# Patient Record
Sex: FEMALE | Race: WHITE | NOT HISPANIC OR LATINO | Employment: FULL TIME | URBAN - METROPOLITAN AREA
[De-identification: names, ages, dates, MRNs, and addresses within clinical notes are randomized per-mention and may not be internally consistent; named-entity substitution may affect disease eponyms.]

---

## 2023-08-03 ENCOUNTER — TELEPHONE (OUTPATIENT)
Dept: OBSTETRICS AND GYNECOLOGY | Facility: CLINIC | Age: 28
End: 2023-08-03
Payer: COMMERCIAL

## 2023-08-07 ENCOUNTER — INITIAL PRENATAL (OUTPATIENT)
Dept: OBSTETRICS AND GYNECOLOGY | Facility: CLINIC | Age: 28
End: 2023-08-07
Payer: COMMERCIAL

## 2023-08-07 VITALS — BODY MASS INDEX: 25.89 KG/M2 | SYSTOLIC BLOOD PRESSURE: 122 MMHG | WEIGHT: 137 LBS | DIASTOLIC BLOOD PRESSURE: 82 MMHG

## 2023-08-07 DIAGNOSIS — O30.031 MONOCHORIONIC DIAMNIOTIC TWIN GESTATION IN FIRST TRIMESTER: ICD-10-CM

## 2023-08-07 DIAGNOSIS — Z34.91 PRENATAL CARE IN FIRST TRIMESTER: Primary | ICD-10-CM

## 2023-08-07 DIAGNOSIS — Z3A.08 8 WEEKS GESTATION OF PREGNANCY: ICD-10-CM

## 2023-08-07 PROBLEM — G43.909 MIGRAINES: Status: ACTIVE | Noted: 2023-08-07

## 2023-08-07 PROCEDURE — 0501F PRENATAL FLOW SHEET: CPT

## 2023-08-07 RX ORDER — PRENATAL VIT NO.126/IRON/FOLIC 28MG-0.8MG
TABLET ORAL DAILY
COMMUNITY

## 2023-08-07 NOTE — ASSESSMENT & PLAN NOTE
PDC Anatomy scan  2x week  testing at 28 weeks   Desires Maternity 21 with gender  Baby aspirin 10-36 weeks

## 2023-08-07 NOTE — PROGRESS NOTES
Initial ob visit     CC- Here for care of pregnancy        Shweta Canela is a 28 y.o. female, , who presents for her first obstetrical visit.  Her last LMP was No LMP recorded. Patient is pregnant.. Her HUA is 3/16/2024, by Ultrasound. Current GA is 8w2d.     Initial positive test date : 23, UPT, blood        Her periods are: every 21-28 days  Prior obstetric issues: none, first pregnancy  Patient's past medical history is significant for:  migraines .  Family history of genetic issues (includes FOB): none  Prior infections concerning in pregnancy (Rash, fever in last 2 weeks): No  Varicella Hx - unknown   Prior testing for Cystic Fibrosis Carrier or Sickle Cell Trait- no  Prepregnancy BMI - Body mass index is 25.89 kg/mý.  History of STD: no  Hx of HSV for patient or partner: no  Ultrasound Today: yes    OB History    Para Term  AB Living   1             SAB IAB Ectopic Molar Multiple Live Births                    # Outcome Date GA Lbr Khadar/2nd Weight Sex Delivery Anes PTL Lv   1 Current                Additional Pertinent History   Last Pap : May 2023 Result: negative HPV: negative     Last Completed Pap Smear       This patient has no relevant Health Maintenance data.          History of abnormal Pap smear: yes - its been a while, negative after  Family history of uterine, colon, breast, or ovarian cancer: no  Feelings of Anxiety or Depression: no  Tobacco Usage?: No   Alcohol/Drug Use?: NO and four white claws before knowing she was pregnant  Over the age of 35 at delivery: no  Desires Genetic Screening: Cell Free DNA  Flu Status: not currently flu season    PMH    Current Outpatient Medications:     prenatal vitamin (prenatal, CLASSIC, vitamin) tablet, Take  by mouth Daily., Disp: , Rfl:      Past Medical History:   Diagnosis Date    Chronic paroxysmal hemicrania, not intractable     Concussion     Migraines     Oral contraceptive use         Past Surgical History:   Procedure  Laterality Date    EAR TUBES      WISDOM TOOTH EXTRACTION         Review of Systems   Review of Systems   Constitutional: Negative.    HENT: Negative.     Eyes: Negative.    Respiratory: Negative.     Cardiovascular: Negative.    Gastrointestinal:  Positive for nausea.   Endocrine: Negative.    Genitourinary:         Cramping   Musculoskeletal: Negative.    Skin: Negative.    Allergic/Immunologic: Negative.    Neurological: Negative.    Hematological: Negative.    Psychiatric/Behavioral: Negative.       Patient Reports: Nausea, Cramping, and Fatigue  Patient Denies: Spotting and Heavy bleeding  All systems reviewed and otherwise normal.    I have reviewed and agree with the HPI, ROS, and historical information as entered above. Karen Steiner, APRN      /82   Wt 62.1 kg (137 lb)   BMI 25.89 kg/mý     The additional following portions of the patient's history were reviewed and updated as appropriate: allergies, current medications, past family history, past medical history, past social history, past surgical history, and problem list.    Physical Exam  General:  well developed; well nourished  no acute distress   Chest/Respiratory: No labored breathing, normal respiratory effort, normal appearance, no respiratory noises noted                            Assessment and Plan    Problem List Items Addressed This Visit          Gravid and     Monochorionic diamniotic twin gestation in first trimester    Current Assessment & Plan     PDC Anatomy scan  2x week  testing at 28 weeks   Desires Maternity 21 with gender  Baby aspirin 10-36 weeks         Relevant Orders    US Ob < 14 Weeks Single or First Gestation    [facility]     Other Visit Diagnoses       Prenatal care in first trimester    -  Primary    Relevant Orders    Obstetric Panel    HIV-1 / O / 2 Ag / Antibody 4th Generation    Urine Culture - Urine, Urine, Clean Catch    Urinalysis With Microscopic - Urine,  Clean Catch    Chlamydia trachomatis, Neisseria gonorrhoeae, PCR - Urine, Urine, Random Void    Urine Drug Screen - Urine, Clean Catch    8 weeks gestation of pregnancy        Relevant Orders    Obstetric Panel    HIV-1 / O / 2 Ag / Antibody 4th Generation    Urine Culture - Urine, Urine, Clean Catch    Urinalysis With Microscopic - Urine, Clean Catch    Chlamydia trachomatis, Neisseria gonorrhoeae, PCR - Urine, Urine, Random Void    Urine Drug Screen - Urine, Clean Catch            Pregnancy at 8w2d. Ultrasound today showed Monochorionic-diamniotic twin pregnancy. 8w2d HUA 3/16/24 Fetus 1: 145bpm Fetus 2: 163 bpm.  Reviewed routine prenatal care with the office and educational materials given  Lab(s) Ordered  Discussed options for genetic testing including first trimester nuchal translucency screen, genetic disease carrier testing, quadruple screen, and NIPT  Discontinue the use of all non-medicinal drugs and chemicals  Nausea/Vomiting - desires medication.  Options discussed and encouraged to try unisom and vitamin b6 first..  Patient is on Prenatal vitamins  U/S ordered at follow up  Referral to PDC Ordered-Anatomy scan at 20 weeks  discussed baby aspirin from 10-36 weeks for prevention of preeclampsia   Patient desires to see Dr. Curtis as her ob provider.  Patient instructed to come to office next week to have Maternity 21 drawn.  Return in about 4 weeks (around 9/4/2023) for Ultrasound, .      Karen Steiner, APRN   08/07/2023

## 2023-08-10 LAB
ABO GROUP BLD: ABNORMAL
AMPHETAMINES UR QL SCN: NEGATIVE NG/ML
APPEARANCE UR: CLEAR
BACTERIA #/AREA URNS HPF: ABNORMAL /[HPF]
BACTERIA UR CULT: NORMAL
BACTERIA UR CULT: NORMAL
BARBITURATES UR QL SCN: NEGATIVE NG/ML
BASOPHILS # BLD AUTO: 0 X10E3/UL (ref 0–0.2)
BASOPHILS NFR BLD AUTO: 0 %
BENZODIAZ UR QL SCN: NEGATIVE NG/ML
BILIRUB UR QL STRIP: NEGATIVE
BLD GP AB SCN SERPL QL: NEGATIVE
BZE UR QL SCN: NEGATIVE NG/ML
C TRACH RRNA SPEC QL NAA+PROBE: NEGATIVE
CANNABINOIDS UR QL SCN: NEGATIVE NG/ML
CASTS URNS QL MICRO: ABNORMAL /LPF
COLOR UR: YELLOW
CREAT UR-MCNC: 174 MG/DL (ref 20–300)
CRYSTALS URNS MICRO: ABNORMAL
EOSINOPHIL # BLD AUTO: 0.1 X10E3/UL (ref 0–0.4)
EOSINOPHIL NFR BLD AUTO: 1 %
EPI CELLS #/AREA URNS HPF: >10 /HPF (ref 0–10)
ERYTHROCYTE [DISTWIDTH] IN BLOOD BY AUTOMATED COUNT: 12.1 % (ref 11.7–15.4)
GLUCOSE UR QL STRIP: NEGATIVE
HBV SURFACE AG SERPL QL IA: NEGATIVE
HCT VFR BLD AUTO: 37.3 % (ref 34–46.6)
HCV IGG SERPL QL IA: NON REACTIVE
HGB BLD-MCNC: 13 G/DL (ref 11.1–15.9)
HGB UR QL STRIP: NEGATIVE
HIV 1+2 AB+HIV1 P24 AG SERPL QL IA: NON REACTIVE
IMM GRANULOCYTES # BLD AUTO: 0 X10E3/UL (ref 0–0.1)
IMM GRANULOCYTES NFR BLD AUTO: 0 %
KETONES UR QL STRIP: NEGATIVE
LABORATORY COMMENT REPORT: NORMAL
LEUKOCYTE ESTERASE UR QL STRIP: NEGATIVE
LYMPHOCYTES # BLD AUTO: 2.4 X10E3/UL (ref 0.7–3.1)
LYMPHOCYTES NFR BLD AUTO: 21 %
MCH RBC QN AUTO: 32.5 PG (ref 26.6–33)
MCHC RBC AUTO-ENTMCNC: 34.9 G/DL (ref 31.5–35.7)
MCV RBC AUTO: 93 FL (ref 79–97)
METHADONE UR QL SCN: NEGATIVE NG/ML
MICRO URNS: NORMAL
MICRO URNS: NORMAL
MONOCYTES # BLD AUTO: 0.9 X10E3/UL (ref 0.1–0.9)
MONOCYTES NFR BLD AUTO: 8 %
N GONORRHOEA RRNA SPEC QL NAA+PROBE: NEGATIVE
NEUTROPHILS # BLD AUTO: 8 X10E3/UL (ref 1.4–7)
NEUTROPHILS NFR BLD AUTO: 70 %
NITRITE UR QL STRIP: NEGATIVE
OPIATES UR QL SCN: NEGATIVE NG/ML
OXYCODONE+OXYMORPHONE UR QL SCN: NEGATIVE NG/ML
PCP UR QL: NEGATIVE NG/ML
PH UR STRIP: 6.5 [PH] (ref 5–7.5)
PH UR: 6.4 [PH] (ref 4.5–8.9)
PLATELET # BLD AUTO: 275 X10E3/UL (ref 150–450)
PROPOXYPH UR QL SCN: NEGATIVE NG/ML
PROT UR QL STRIP: NEGATIVE
RBC # BLD AUTO: 4 X10E6/UL (ref 3.77–5.28)
RBC #/AREA URNS HPF: ABNORMAL /HPF (ref 0–2)
RH BLD: POSITIVE
RPR SER QL: NON REACTIVE
RUBV IGG SERPL IA-ACNC: 6.59 INDEX
SP GR UR STRIP: 1.02 (ref 1–1.03)
UNIDENT CRYS URNS QL MICRO: PRESENT
UROBILINOGEN UR STRIP-MCNC: 0.2 MG/DL (ref 0.2–1)
WBC # BLD AUTO: 11.5 X10E3/UL (ref 3.4–10.8)
WBC #/AREA URNS HPF: ABNORMAL /HPF (ref 0–5)

## 2023-08-11 ENCOUNTER — TELEPHONE (OUTPATIENT)
Dept: OBSTETRICS AND GYNECOLOGY | Facility: CLINIC | Age: 28
End: 2023-08-11
Payer: COMMERCIAL

## 2023-08-11 ENCOUNTER — TELEMEDICINE (OUTPATIENT)
Dept: FAMILY MEDICINE CLINIC | Facility: TELEHEALTH | Age: 28
End: 2023-08-11
Payer: COMMERCIAL

## 2023-08-11 DIAGNOSIS — R50.9 LOW GRADE FEVER: ICD-10-CM

## 2023-08-11 DIAGNOSIS — R51.9 ACUTE NONINTRACTABLE HEADACHE, UNSPECIFIED HEADACHE TYPE: Primary | ICD-10-CM

## 2023-08-11 PROBLEM — J45.40 MODERATE PERSISTENT ASTHMA WITHOUT COMPLICATION: Status: ACTIVE | Noted: 2023-05-26

## 2023-08-11 PROBLEM — H72.90 SEROUS OTITIS MEDIA WITH RUPTURE OF TYMPANIC MEMBRANE: Status: ACTIVE | Noted: 2023-05-26

## 2023-08-11 PROBLEM — Z30.9 CONTRACEPTIVE MANAGEMENT: Status: ACTIVE | Noted: 2017-06-29

## 2023-08-11 PROBLEM — F41.9 ANXIETY: Status: ACTIVE | Noted: 2023-05-26

## 2023-08-11 PROBLEM — H57.12 EYE PAIN, LEFT: Status: ACTIVE | Noted: 2023-05-26

## 2023-08-11 PROBLEM — R01.1 MURMUR: Status: ACTIVE | Noted: 2023-05-26

## 2023-08-11 PROBLEM — F32.A DEPRESSION, CONTROLLED: Status: ACTIVE | Noted: 2020-08-24

## 2023-08-11 PROBLEM — T75.3XXA MOTION SICKNESS: Status: ACTIVE | Noted: 2023-05-26

## 2023-08-11 PROBLEM — R45.86 MOOD SWINGS: Status: ACTIVE | Noted: 2023-05-26

## 2023-08-11 PROBLEM — H66.002 NON-RECURRENT ACUTE SUPPURATIVE OTITIS MEDIA OF LEFT EAR WITHOUT SPONTANEOUS RUPTURE OF TYMPANIC MEMBRANE: Status: ACTIVE | Noted: 2023-05-26

## 2023-08-11 PROBLEM — H65.90 SEROUS OTITIS MEDIA WITH RUPTURE OF TYMPANIC MEMBRANE: Status: ACTIVE | Noted: 2023-05-26

## 2023-08-11 PROBLEM — G89.29 CHRONIC NONINTRACTABLE HEADACHE: Status: ACTIVE | Noted: 2023-05-26

## 2023-08-11 PROBLEM — Z12.4 ENCOUNTER FOR PAPANICOLAOU SMEAR OF CERVIX: Status: ACTIVE | Noted: 2018-09-19

## 2023-08-11 PROBLEM — K12.1 ULCER MOUTH: Status: ACTIVE | Noted: 2023-05-26

## 2023-08-11 PROBLEM — G43.919 MIGRAINE, INTRACTABLE: Status: ACTIVE | Noted: 2023-05-26

## 2023-08-11 PROBLEM — J45.909 ASTHMA: Status: ACTIVE | Noted: 2023-05-26

## 2023-08-11 PROBLEM — K12.0 ORAL APHTHOUS ULCER: Status: ACTIVE | Noted: 2023-05-26

## 2023-08-11 RX ORDER — ALBUTEROL SULFATE 90 UG/1
AEROSOL, METERED RESPIRATORY (INHALATION)
COMMUNITY
Start: 2023-03-24

## 2023-08-11 RX ORDER — ACETAMINOPHEN 325 MG/1
TABLET ORAL
COMMUNITY

## 2023-08-12 NOTE — PROGRESS NOTES
You have chosen to receive care through a telehealth visit.  Do you consent to use a video/audio connection for your medical care today? Yes     CHIEF COMPLAINT  Chief Complaint   Patient presents with    Headache         HPI  Shweta Canela is a 28 y.o. female  presents with complaint of major headache and a low grade fever. Reports she felt hot earlier and checked her temp 99.6. Reports she is 9 weeks pregnant with twins. Reports her head was hurting in the back right. Rate it a 7 on 1-10 scale. Reports she took some tylenol for her symptoms. Reports she is concerned regarding her low grade fever due to having some bacteria in her urine in the office 4 days ago. Reports she had a few chills at work. Denies any nausea or vomiting.     Review of Systems   Constitutional:  Positive for chills and fever. Negative for fatigue.        Low grade fever   HENT:  Negative for congestion, ear discharge, ear pain, sinus pressure, sinus pain and sore throat.    Respiratory:  Negative for cough, chest tightness, shortness of breath and wheezing.    Cardiovascular:  Negative for chest pain.   Gastrointestinal:  Negative for abdominal pain, diarrhea, nausea and vomiting.   Genitourinary:  Positive for frequency. Negative for dysuria, urgency and vaginal bleeding.   Musculoskeletal:  Negative for back pain and myalgias.   Neurological:  Positive for headaches. Negative for dizziness.   Psychiatric/Behavioral: Negative.       Past Medical History:   Diagnosis Date    Chronic paroxysmal hemicrania, not intractable     Concussion     Migraines     Oral contraceptive use        Family History   Problem Relation Age of Onset    No Known Problems Mother     No Known Problems Father     Migraines Maternal Aunt     Heart attack Maternal Grandfather     Coronary artery disease Maternal Grandfather     Cancer Other         lung    No Known Problems Sister     No Known Problems Maternal Grandmother        Social History     Socioeconomic  History    Marital status:    Tobacco Use    Smoking status: Never    Smokeless tobacco: Never   Vaping Use    Vaping Use: Never used   Substance and Sexual Activity    Alcohol use: Yes     Alcohol/week: 4.0 standard drinks     Types: 4 Cans of beer per week     Comment: four beers before knowing pregnant    Drug use: No    Sexual activity: Yes     Partners: Male     Birth control/protection: Condom       Shweta Canela  reports that she has never smoked. She has never used smokeless tobacco.. I have educated her on the risk of diseases from using tobacco products such as cancer, COPD, and heart disease.         I spent  1  minutes counseling the patient.              Breastfeeding No     PHYSICAL EXAM  Physical Exam   Constitutional: She is oriented to person, place, and time. She appears well-developed and well-nourished. No distress.   HENT:   Head: Normocephalic and atraumatic.   Right Ear: Hearing normal.   Left Ear: Hearing normal.   Nose: No congestion.   Mouth/Throat: Mouth/Lips are normal.  Eyes: Conjunctivae and lids are normal.   Pulmonary/Chest: Effort normal.  No respiratory distress.  Abdominal: There is no abdominal tenderness.   Patient directed exam   Neurological: She is alert and oriented to person, place, and time.   Psychiatric: She has a normal mood and affect. Her speech is normal and behavior is normal.     Results for orders placed or performed in visit on 08/07/23   Urine Culture - Urine, Urine, Clean Catch    Specimen: Urine, Clean Catch         CD- [id number]   Result Value Ref Range    Urine Culture Final report     Result 1 Comment    Chlamydia trachomatis, Neisseria gonorrhoeae, PCR - Urine, Urine, Random Void    Specimen: Urine, Random Void         CD- [id number]   Result Value Ref Range    Chlamydia trachomatis, GE[place]A Negative Negative    Neisseria gonorrhoeae, [place] Negative Negative   Obstetric Panel    Specimen: Blood   Result Value Ref Range    Hepatitis B Surface Ag  Negative Negative    Hep C Virus Ab Non Reactive Non Reactive    RPR Non Reactive Non Reactive    Rubella Antibodies, IgG 6.59 Immune >0.99 index    ABO Type A     Rh Factor Positive     Antibody Screen Negative Negative    WBC 11.5 (H) 3.4 - 10.8 x10E3/uL    RBC 4.00 3.77 - 5.28 x10E6/uL    Hemoglobin 13.0 11.1 - 15.9 g/dL    Hematocrit 37.3 34.0 - 46.6 %    MCV 93 79 - 97 fL    MCH 32.5 26.6 - 33.0 pg    MCHC 34.9 31.5 - 35.7 g/dL    RDW 12.1 11.7 - 15.4 %    Platelets 275 150 - 450 x10E3/uL    Neutrophil Rel % 70 Not Estab. %    Lymphocyte Rel % 21 Not Estab. %    Monocyte Rel % 8 Not Estab. %    Eosinophil Rel % 1 Not Estab. %    Basophil Rel % 0 Not Estab. %    Neutrophils Absolute 8.0 (H) 1.4 - 7.0 x10E3/uL    Lymphocytes Absolute 2.4 0.7 - 3.1 x10E3/uL    Monocytes Absolute 0.9 0.1 - 0.9 x10E3/uL    Eosinophils Absolute 0.1 0.0 - 0.4 x10E3/uL    Basophils Absolute 0.0 0.0 - 0.2 x10E3/uL    Immature Granulocyte Rel % 0 Not Estab. %    Immature Grans Absolute 0.0 0.0 - 0.1 x10E3/uL   HIV-1 / O / 2 Ag / Antibody 4th Generation    Specimen: Blood   Result Value Ref Range    HIV Screen 4th Gen w/RFX (Reference) Non Reactive Non Reactive   Urine Drug Screen - Urine, Clean Catch    Specimen: Urine, Clean Catch   Result Value Ref Range    Amphetamine, Urine Qual Negative Mymnie=0324 ng/mL    Barbiturates Screen, Urine Negative Rvjsdr=412 ng/mL    Benzodiazepine Screen, Urine Negative Ukwvks=358 ng/mL    THC Screen, Urine Negative Cutoff=20 ng/mL    Cocaine Screen, Urine Negative Trlcjy=700 ng/mL    Opiate Screen, Urine Negative Dlbmpf=228 ng/mL    Oxycodone/Oxymorphone, Urine Negative Dvfntq=278 ng/mL    Phencyclidine (PCP), Urine Negative Cutoff=25 ng/mL    Methadone Screen, Urine Negative Ipznke=989 ng/mL    Propoxyphene Screen Negative Afzvfu=826 ng/mL    Creatinine, Urine 174.0 20.0 - 300.0 mg/dL    pH, UA 6.4 4.5 - 8.9    Please note Comment    Microscopic Examination -   Result Value Ref Range    WBC, UA None  seen 0 - 5 /hpf    RBC, UA None seen 0 - 2 /hpf    Epithelial Cells (non renal) >10 (A) 0 - 10 /hpf    Casts None seen None seen /lpf    Crystals, UA Present (A) N/A    Crystal Type Calcium Oxalate N/A    Bacteria, UA Moderate (A) None seen/Few   Urinalysis With Microscopic - Urine, Clean Catch    Specimen: Urine, Clean Catch   Result Value Ref Range    Specific Gravity, UA 1.023 1.005 - 1.030    pH, UA 6.5 5.0 - 7.5    Color, UA Yellow Yellow    Appearance, UA Clear Clear    Leukocytes, UA Negative Negative    Protein Negative Negative/Trace    Glucose, UA Negative Negative    Ketones Negative Negative    Blood, UA Negative Negative    Bilirubin, UA Negative Negative    Urobilinogen, UA 0.2 0.2 - 1.0 mg/dL    Nitrite, UA Negative Negative    Microscopic Examination Comment     Microscopic Examination See below:        Diagnoses and all orders for this visit:    1. Acute nonintractable headache, unspecified headache type (Primary)    2. Low grade fever    Advised patient to go to ER for in person evaluation. Doesn't wish to go to [facility] where she is planning on delivering. Understands the risk of not going to the ER. Reports she is thinking of going to urgent care in am. Encouraged her to go to ER reports she will go to [facility] now.            Nena Rankin, APRN  08/11/2023  21:51 EDT    The use of a video visit has been reviewed with the patient and verbal informed consent has been obtained. Myself and Shweta Canela participated in this visit. The patient is located in [address] [place] [address].    I am located in [place], [place]. Mychart and Twilio were utilized. I spent 5 minutes in the patient's chart for this visit.

## 2023-08-14 ENCOUNTER — LAB (OUTPATIENT)
Dept: OBSTETRICS AND GYNECOLOGY | Facility: CLINIC | Age: 28
End: 2023-08-14
Payer: COMMERCIAL

## 2023-08-14 DIAGNOSIS — O30.031 MONOCHORIONIC DIAMNIOTIC TWIN GESTATION IN FIRST TRIMESTER: ICD-10-CM

## 2023-08-14 DIAGNOSIS — Z34.01 ENCOUNTER FOR SUPERVISION OF NORMAL FIRST PREGNANCY IN FIRST TRIMESTER: Primary | ICD-10-CM

## 2023-08-14 DIAGNOSIS — Z34.91 FIRST TRIMESTER PREGNANCY: ICD-10-CM

## 2023-08-18 ENCOUNTER — TELEPHONE (OUTPATIENT)
Dept: OBSTETRICS AND GYNECOLOGY | Facility: CLINIC | Age: 28
End: 2023-08-18
Payer: COMMERCIAL

## 2023-08-18 LAB
5P15 DELETION (CRI-DU-CHAT): NOT DETECTED
CFDNA.FET/CFDNA.TOTAL SFR FETUS: NORMAL %
CITATION REF LAB TEST: NORMAL
FET 13+18+21+X+Y ANEUP PLAS.CFDNA: NEGATIVE
FET 1P36 DEL RISK WBC.DNA+CFDNA QL: NOT DETECTED
FET 22Q11.2 DEL RISK WBC.DNA+CFDNA QL: NOT DETECTED
FET CHR 11Q23 DEL PLAS.CFDNA QL: NOT DETECTED
FET CHR 15Q11 DEL PLAS.CFDNA QL: NOT DETECTED
FET CHR 21 TS PLAS.CFDNA QL: NEGATIVE
FET CHR 4P16 DEL PLAS.CFDNA QL: NOT DETECTED
FET CHR 8Q24 DEL PLAS.CFDNA QL: NOT DETECTED
FET SEX PLAS.CFDNA DOSAGE CFDNA: NORMAL
FET TS 13 RISK PLAS.CFDNA QL: NEGATIVE
FET TS 18 RISK WBC.DNA+CFDNA QL: NEGATIVE
GA EST FROM CONCEPTION DATE: NORMAL D
GESTATIONAL AGE > 9:: YES
LAB DIRECTOR NAME PROVIDER: NORMAL
LAB DIRECTOR NAME PROVIDER: NORMAL
LABORATORY COMMENT REPORT: NORMAL
LIMITATIONS OF THE TEST: NORMAL
NEGATIVE PREDICTIVE VALUE: NORMAL
NOTE: NORMAL
PERFORMANCE CHARACTERISTICS: NORMAL
POSITIVE PREDICTIVE VALUE: NORMAL
REF LAB TEST METHOD: NORMAL
TEST PERFORMANCE INFO SPEC: NORMAL
TRIOSOMY 16: NOT DETECTED
TRISOMY 22: NOT DETECTED

## 2023-08-18 NOTE — TELEPHONE ENCOUNTER
PT IS 10 WEEKS PREGNANT AND IS HAVING BACK PAIN AND SHE STATED THAT IT FEELS LIKE IT NEEDS TO BE POPPED. SHE WOULD LIKE SOMEONE TO RETURN HER CALL.

## 2023-08-18 NOTE — TELEPHONE ENCOUNTER
"[facility] OB patient  9w6d    Patient reports low back pain that radiates to her tail bone. Patient states this pain is worse with walking. Patient states that she has \"normal\" back aches, but this is different. Patient states that this pain is sharp and shooting. Patient denies vaginal bleeding and urinary symptoms. Patient denies injury. I advised patient to try Tylenol, warm bath, and stretching to see if this helps resolve pain. Per KN- this is sciatic nerve pain and no further recommendations.     Patient with questions regarding status of [id number]. Per Gerri- results are pending.     Informed the patient of this and she v/u.   "

## 2023-09-05 ENCOUNTER — ROUTINE PRENATAL (OUTPATIENT)
Dept: OBSTETRICS AND GYNECOLOGY | Facility: CLINIC | Age: 28
End: 2023-09-05
Payer: COMMERCIAL

## 2023-09-05 VITALS — DIASTOLIC BLOOD PRESSURE: 82 MMHG | WEIGHT: 138 LBS | SYSTOLIC BLOOD PRESSURE: 112 MMHG | BODY MASS INDEX: 26.07 KG/M2

## 2023-09-05 DIAGNOSIS — Z34.91 PRENATAL CARE IN FIRST TRIMESTER: Primary | ICD-10-CM

## 2023-09-05 DIAGNOSIS — O30.031 MONOCHORIONIC DIAMNIOTIC TWIN GESTATION IN FIRST TRIMESTER: ICD-10-CM

## 2023-09-05 LAB
GLUCOSE UR STRIP-MCNC: NEGATIVE MG/DL
PROT UR STRIP-MCNC: NEGATIVE MG/DL

## 2023-09-05 NOTE — PROGRESS NOTES
OB FOLLOW UP  CC- Here for care of pregnancy        Shweta Canela is a 28 y.o.  12w3d patient being seen today for her obstetrical follow up visit. Patient reports headache (That is relieved by Tylenol or rest) and nausea. Patient states that she is trying to eat small meals every 2-3 hours. Patient states that she is feeling arce quicker. Patient states that she is constantly nauseous. Patient states that she tested for a UTI yesterday for tailbone pain with walking. Patient states that they saw microscopic blood in her urine under a microscope. Patient denies urinary s/s.     Patient will attempt to leave [facility] prior to leaving today.      Her prenatal care is complicated by (and status) :   Patient Active Problem List   Diagnosis    Monochorionic diamniotic twin gestation in first trimester    Migraines    Anxiety    Asthma    Chronic nonintractable headache    Eye pain, left    Contraceptive management    Encounter for Papanicolaou smear of cervix    Screening for STD (sexually transmitted disease)    Depression, controlled    Moderate persistent asthma without complication    Mood swings    Motion sickness    Murmur    Non-recurrent acute suppurative otitis media of left ear without spontaneous rupture of tympanic membrane    Oral aphthous ulcer    Ulcer mouth    Serous otitis media with rupture of tympanic membrane    Migraine, intractable       Desires genetic testing?:  Completed. Low risk, BOY.  Ultrasound Today: Yes. FHR: 160/159.     ROS -   Patient Reports :  see above  Patient Denies: Loss of Fluid, Vaginal Spotting, Vision Changes, and Vomiting   Fetal Movement :  N/A  All other systems reviewed and are negative.     The additional following portions of the patient's history were reviewed and updated as appropriate: allergies and current medications.    I have reviewed and agree with the HPI, ROS, and historical information as entered above. Selina Curtis MD          /82   Wt  62.6 kg (138 lb)   BMI 26.07 kg/m²         EXAM:     Prenatal Vitals  BP: 112/82  Weight: 62.6 kg (138 lb)   Fetal Heart Rate: 160/159          Urine Glucose Read-only: Negative  Urine Protein Read-only: Negative       Assessment and Plan    Problem List Items Addressed This Visit          Gravid and     Monochorionic diamniotic twin gestation in first trimester     Other Visit Diagnoses       Prenatal care in first trimester    -  Primary    Relevant Orders    POC Urinalysis Dipstick (Completed)            Pregnancy at 12w3d  Labs reviewed from New OB Visit.  Counseled on genetic testing, carrier status and option for NT screen  Activity and Exercise discussed.  Patient is on Prenatal vitamins  Mono / Di twins - u/s in 4 weeks then to PDC at 18 weeks.    Return in about 4 weeks (around 10/3/2023) for US with Next Visit.    Selina Curtis MD  2023

## 2023-09-20 ENCOUNTER — TELEPHONE (OUTPATIENT)
Dept: OBSTETRICS AND GYNECOLOGY | Facility: CLINIC | Age: 28
End: 2023-09-20
Payer: COMMERCIAL

## 2023-09-20 NOTE — TELEPHONE ENCOUNTER
14w4d. Recommended 1000mg of tylenol, beverage with caffeine, 50mg of benadryl, a bottle of water, lay down and rest. If after waking up the HA is still there or her BP is >120/80 then call us back. She DILSHAD. States she checked her BP just now and it was 131/81.  Informed to call back if not down by the time she gets up. Brooke YUNG

## 2023-09-20 NOTE — TELEPHONE ENCOUNTER
Pt stated she has had a headache for 3 days stated a 7/10 on the pain scale. Stated its origin is the back of her head. Stated she is also incredibly nauseous and extremely fatigued.  Stated hasn't had blurry or spots on her vision and denies dizziness

## 2023-09-21 ENCOUNTER — TELEPHONE (OUTPATIENT)
Dept: OBSTETRICS AND GYNECOLOGY | Facility: CLINIC | Age: 28
End: 2023-09-21
Payer: COMMERCIAL

## 2023-09-21 NOTE — TELEPHONE ENCOUNTER
See previous phone note. Pt is calling back to say she is not feeling any better. She states her headache improved yesterday but is back today, she took her BP and is was 110 over something she could not remember the bottom number.     Please advise

## 2023-09-21 NOTE — TELEPHONE ENCOUNTER
Patient is calling about seeing dark spots. Patient states her head is stilling hurting.    111/71 was her last reading at 1145

## 2023-09-21 NOTE — TELEPHONE ENCOUNTER
[facility] OB pt  14w5d Twin pregnancy    Patient states that she has had a headache for the past 4 days. Patient states that she called yesterday & that her headache went away after taking Tylenol, Benadryl, caffeine, and getting some rest. The headache came back just before bed & it is still at the back of her head. Patient states it does not hurt as bad as it did. She rates it a 5-6/10. Patient states her B/P is 111/71. Patient states she is seeing some occasional spots today.    Per EF, Patient can try allergy medication such as Zyrtec or Sudafed with Tylenol, call with any B/P 140/90 & encourage pt to schedule an appt for an eye exam.    Patient notified of recommendations & to call back if allergy medication with Tylenol does not relieve headache. Patient v/u.

## 2023-10-10 ENCOUNTER — ROUTINE PRENATAL (OUTPATIENT)
Dept: OBSTETRICS AND GYNECOLOGY | Facility: CLINIC | Age: 28
End: 2023-10-10
Payer: COMMERCIAL

## 2023-10-10 VITALS — BODY MASS INDEX: 26.83 KG/M2 | DIASTOLIC BLOOD PRESSURE: 70 MMHG | WEIGHT: 142 LBS | SYSTOLIC BLOOD PRESSURE: 106 MMHG

## 2023-10-10 DIAGNOSIS — Z23 FLU VACCINE NEED: ICD-10-CM

## 2023-10-10 DIAGNOSIS — O30.031 MONOCHORIONIC DIAMNIOTIC TWIN GESTATION IN FIRST TRIMESTER: Primary | ICD-10-CM

## 2023-10-10 DIAGNOSIS — Z34.92 PRENATAL CARE IN SECOND TRIMESTER: ICD-10-CM

## 2023-10-10 LAB
GLUCOSE UR STRIP-MCNC: NEGATIVE MG/DL
PROT UR STRIP-MCNC: NEGATIVE MG/DL

## 2023-10-10 NOTE — PROGRESS NOTES
OB FOLLOW UP  CC- Here for care of pregnancy        Shweta Canela is a 28 y.o.  17w3d patient being seen today for her obstetrical follow up visit. Patient reports headache. That is relieved by Tylenol or rest. , low back pain. She denies dysuria., and heartburn. She is taking OTC medication for treatment currently.    Her prenatal care is complicated by (and status) :   Patient Active Problem List   Diagnosis    Monochorionic diamniotic twin gestation in first trimester    Migraines    Anxiety    Asthma    Chronic nonintractable headache    Eye pain, left    Contraceptive management    Encounter for Papanicolaou smear of cervix    Screening for STD (sexually transmitted disease)    Depression, controlled    Moderate persistent asthma without complication    Mood swings    Motion sickness    Murmur    Non-recurrent acute suppurative otitis media of left ear without spontaneous rupture of tympanic membrane    Oral aphthous ulcer    Ulcer mouth    Serous otitis media with rupture of tympanic membrane    Migraine, intractable       Flu Status: Will give in office today  Ultrasound Today: Yes    AFP: is undecided about    ROS -   Patient Reports :  see above  Patient Denies: Loss of Fluid, Vaginal Spotting, Vision Changes, Nausea , Vomiting , Contractions, and Epigastric pain  Fetal Movement : normal  All other systems reviewed and are negative.       The additional following portions of the patient's history were reviewed and updated as appropriate: allergies and current medications.      I have reviewed and agree with the HPI, ROS, and historical information as entered above. Selina Curtis MD          EXAM:     Prenatal Vitals  BP: 106/70  Weight: 64.4 kg (142 lb)   Fetal Heart Rate: pos / pos        Urine Glucose Read-only: Negative  Urine Protein Read-only: Negative           Assessment and Plan    Problem List Items Addressed This Visit          Gravid and     Monochorionic diamniotic twin  gestation in first trimester - Primary    Relevant Orders    US OB Follow Up Transabdominal Approach    US Ob Follow Up Transabdominal Approach each additional gestation (Completed)     Other Visit Diagnoses       Prenatal care in second trimester        Relevant Orders    POC Urinalysis Dipstick (Completed)    Fluzone >6 Months (8998-5133) (Completed)    US OB Follow Up Transabdominal Approach    US Ob Follow Up Transabdominal Approach each additional gestation (Completed)    Flu vaccine need        Relevant Orders    Fluzone >6 Months (8342-6059) (Completed)            Pregnancy at 17w3d  Mono Di tiwns - concordant growth.  PDC next visit for anatomy  Fetal status reassuring.   Counseled on MSAFP alone in relation to OTD and placental issues.    Anatomy scan next visit.   Activity and Exercise discussed.  Patient is on Prenatal vitamins  Return in about 4 weeks (around 11/7/2023) for US with Next Visit.    Selina Curtis MD  10/10/2023

## 2023-10-11 ENCOUNTER — TELEPHONE (OUTPATIENT)
Dept: OBSTETRICS AND GYNECOLOGY | Facility: CLINIC | Age: 28
End: 2023-10-11
Payer: COMMERCIAL

## 2023-10-11 NOTE — TELEPHONE ENCOUNTER
Returned patient's call. G1 @ 17w 4d with Mo/Di twins. States her visit note says she was counseled on MSAFP alone but she does not recall discussing it. She asked if ultrasound done yesterday noted any cause for concern for ONTD. Advised her that no concern was noted; that is a standard test that is offered to all patients. She v/u and will discuss further at next visit.

## 2023-10-11 NOTE — TELEPHONE ENCOUNTER
Pt would like to discuss questions regarding blood work for msafp (read patient message sent from [employer] today) she would like someone to call to discuss this further

## 2023-10-20 ENCOUNTER — TELEPHONE (OUTPATIENT)
Dept: OBSTETRICS AND GYNECOLOGY | Facility: CLINIC | Age: 28
End: 2023-10-20
Payer: COMMERCIAL

## 2023-10-20 NOTE — TELEPHONE ENCOUNTER
Patient denies contractions, vaginal bleeding, urinary urgency, and dysuria. She reports the pain is in her lower back, similar to a muscle ache. We discussed that she can take tylenol, rest, take a warm bath, and increase fluids but if s/s do not improve or if they worsen she will need to be evaluated.

## 2023-10-24 ENCOUNTER — ROUTINE PRENATAL (OUTPATIENT)
Dept: OBSTETRICS AND GYNECOLOGY | Facility: CLINIC | Age: 28
End: 2023-10-24
Payer: COMMERCIAL

## 2023-10-24 ENCOUNTER — OFFICE VISIT (OUTPATIENT)
Dept: OBSTETRICS AND GYNECOLOGY | Facility: HOSPITAL | Age: 28
End: 2023-10-24
Payer: COMMERCIAL

## 2023-10-24 ENCOUNTER — HOSPITAL ENCOUNTER (OUTPATIENT)
Dept: WOMENS IMAGING | Facility: HOSPITAL | Age: 28
Discharge: HOME | End: 2023-10-24
Payer: COMMERCIAL

## 2023-10-24 VITALS — BODY MASS INDEX: 27.13 KG/M2 | SYSTOLIC BLOOD PRESSURE: 102 MMHG | WEIGHT: 143.6 LBS | DIASTOLIC BLOOD PRESSURE: 68 MMHG

## 2023-10-24 VITALS — DIASTOLIC BLOOD PRESSURE: 77 MMHG | WEIGHT: 143 LBS | BODY MASS INDEX: 27.02 KG/M2 | SYSTOLIC BLOOD PRESSURE: 112 MMHG

## 2023-10-24 DIAGNOSIS — Z34.92 PRENATAL CARE IN SECOND TRIMESTER: Primary | ICD-10-CM

## 2023-10-24 DIAGNOSIS — O30.031 MONOCHORIONIC DIAMNIOTIC TWIN GESTATION IN FIRST TRIMESTER: ICD-10-CM

## 2023-10-24 DIAGNOSIS — O30.031 MONOCHORIONIC DIAMNIOTIC TWIN GESTATION IN FIRST TRIMESTER: Primary | ICD-10-CM

## 2023-10-24 LAB
GLUCOSE UR STRIP-MCNC: NEGATIVE MG/DL
PROT UR STRIP-MCNC: NEGATIVE MG/DL

## 2023-10-24 PROCEDURE — 0502F SUBSEQUENT PRENATAL CARE: CPT | Performed by: OBSTETRICS & GYNECOLOGY

## 2023-10-24 PROCEDURE — 76812 OB US DETAILED ADDL FETUS: CPT

## 2023-10-24 PROCEDURE — 76811 OB US DETAILED SNGL FETUS: CPT

## 2023-10-24 PROCEDURE — 76820 UMBILICAL ARTERY ECHO: CPT

## 2023-10-24 RX ORDER — MAGNESIUM HYDROXIDE/ALUMINUM HYDROXICE/SIMETHICONE 120; 1200; 1200 MG/30ML; MG/30ML; MG/30ML
5 SUSPENSION ORAL EVERY 6 HOURS PRN
COMMUNITY

## 2023-10-24 RX ORDER — CALCIUM CARBONATE 500 MG/1
1 TABLET, CHEWABLE ORAL AS NEEDED
COMMUNITY

## 2023-10-24 RX ORDER — ASPIRIN 81 MG/1
81 TABLET ORAL DAILY
COMMUNITY

## 2023-10-24 NOTE — PROGRESS NOTES
Patient denies bleeding, leaking fluid or contractions  NIPT negative  Patients next follow up with Dr. Curtis office is today

## 2023-10-24 NOTE — PROGRESS NOTES
OB FOLLOW UP  CC- Here for care of pregnancy        Shweta Canela is a 28 y.o.  19w3d patient being seen today for her obstetrical follow up visit. Patient reports headache (That is relieved by Tylenol or rest), mid-back pain (She denies dysuria), heartburn (She is taking OTC medication for treatment currently), and bilateral hip/sciatic nerve pain. Patient states that she has been having intermittent mid-back pain. Patient states that she thinks her mid-back pain is related to her chair at work. Patient denies urinary s/s. Patient states that she is having difficulty sleeping at night from sciatic nerve and hip pain.     Her prenatal care is complicated by (and status) :   Patient Active Problem List   Diagnosis    Monochorionic diamniotic twin gestation in first trimester    Migraines    Anxiety    Asthma    Chronic nonintractable headache    Eye pain, left    Contraceptive management    Encounter for Papanicolaou smear of cervix    Screening for STD (sexually transmitted disease)    Depression, controlled    Moderate persistent asthma without complication    Mood swings    Motion sickness    Murmur    Non-recurrent acute suppurative otitis media of left ear without spontaneous rupture of tympanic membrane    Oral aphthous ulcer    Ulcer mouth    Serous otitis media with rupture of tympanic membrane    Migraine, intractable       Flu Status: Already given in current flu season  Ultrasound Today: Yes with PDC    ROS -   Patient Reports :  see above  Patient Denies: Loss of Fluid, Vaginal Spotting, Vision Changes, Nausea , Vomiting , Contractions, and Epigastric pain  Fetal Movement : normal  All other systems reviewed and are negative.       The additional following portions of the patient's history were reviewed and updated as appropriate: allergies and current medications.      I have reviewed and agree with the HPI, ROS, and historical information as entered above. Selina Curtis MD      BP  102/68   Wt 65.1 kg (143 lb 9.6 oz)   BMI 27.13 kg/m²       EXAM:     Prenatal Vitals  BP: 102/68  Weight: 65.1 kg (143 lb 9.6 oz)   Fetal Heart Rate: 140/140          Urine Glucose Read-only: Negative  Urine Protein Read-only: Negative       Assessment and Plan    Problem List Items Addressed This Visit          Gravid and     Monochorionic diamniotic twin gestation in first trimester    Relevant Orders    US Ob Limited 1 + Fetuses     Other Visit Diagnoses       Prenatal care in second trimester    -  Primary    Relevant Orders    POC Urinalysis Dipstick (Completed)            Pregnancy at 19w3d  Anatomy scan today is complete and appear within normal limits. Concordant growth.  Fetal status reassuring.   Activity and Exercise discussed.  Patient is on Prenatal vitamins  Return in about 2 weeks (around 2023) for US with Next Visit.    Selina Curtis MD  10/24/2023

## 2023-11-03 ENCOUNTER — TELEPHONE (OUTPATIENT)
Dept: OBSTETRICS AND GYNECOLOGY | Facility: CLINIC | Age: 28
End: 2023-11-03
Payer: COMMERCIAL

## 2023-11-03 NOTE — TELEPHONE ENCOUNTER
Patient calling to go over her anatomy scan results. She states she did some research on [employer] and she wants to make sure everything is ok.

## 2023-11-03 NOTE — TELEPHONE ENCOUNTER
Per DANDY Garcia- CPCs are resolving and genetic testing is negative. PDC did not express concern so this is reassuring. Patient can discuss further concerns with LOS at her next f/u appointment. Informed patient of this and she v/u.

## 2023-11-03 NOTE — TELEPHONE ENCOUNTER
"LOS OB patient  20w6d  Next visit: 11/07/2023 with [facility]    Patient states that she had her anatomy scan with PDC on 10/24/2023 and [facility] did not see anything alarming or abnormal on her anatomy scan. Patient states that her AVS was posted last night from [facility] and saw an \"abnormality\" concerning a Choroid Plexus Cyst for Twin B. Patient states that she is concerned and would like some reassurance. cfDNA testing negative.   "

## 2023-11-07 ENCOUNTER — ROUTINE PRENATAL (OUTPATIENT)
Dept: OBSTETRICS AND GYNECOLOGY | Facility: CLINIC | Age: 28
End: 2023-11-07
Payer: COMMERCIAL

## 2023-11-07 VITALS — DIASTOLIC BLOOD PRESSURE: 70 MMHG | BODY MASS INDEX: 27.89 KG/M2 | SYSTOLIC BLOOD PRESSURE: 118 MMHG | WEIGHT: 147.6 LBS

## 2023-11-07 DIAGNOSIS — Z34.92 PRENATAL CARE IN SECOND TRIMESTER: Primary | ICD-10-CM

## 2023-11-07 DIAGNOSIS — O30.031 MONOCHORIONIC DIAMNIOTIC TWIN GESTATION IN FIRST TRIMESTER: ICD-10-CM

## 2023-11-07 LAB
GLUCOSE UR STRIP-MCNC: NEGATIVE MG/DL
PROT UR STRIP-MCNC: NEGATIVE MG/DL

## 2023-11-07 NOTE — PROGRESS NOTES
OB FOLLOW UP  CC- Here for care of pregnancy        Shweta Canela is a 28 y.o.  21w3d patient being seen today for her obstetrical follow up visit. Patient reports headache (That is sometimes relieved by Tylenol or rest), low back pain (She denies dysuria), heartburn (She is taking OTC medication for treatment currently), and constipation. Patient states that her headaches are improving. Patient with h/o migraines. Patient states that she has IBS and has always struggled with constipation. Patient denies taking stool softener.    Her prenatal care is complicated by (and status) :   Patient Active Problem List   Diagnosis    Monochorionic diamniotic twin gestation in first trimester    Migraines    Anxiety    Asthma    Chronic nonintractable headache    Eye pain, left    Contraceptive management    Encounter for Papanicolaou smear of cervix    Screening for STD (sexually transmitted disease)    Depression, controlled    Moderate persistent asthma without complication    Mood swings    Motion sickness    Murmur    Non-recurrent acute suppurative otitis media of left ear without spontaneous rupture of tympanic membrane    Oral aphthous ulcer    Ulcer mouth    Serous otitis media with rupture of tympanic membrane    Migraine, intractable       Flu Status: Already given in current flu season  Ultrasound Today: Yes. Fetus 1: 145bpm. Cephalic. Posterior placenta. 3 vessel cord. AF: normal. Fetus 2: 151bpm. Breech. Posterior placenta. 3 vessel cord. AF: normal. Patient scheduled to see [facility] on 2023.    ROS -   Patient Reports :  see above  Patient Denies: Loss of Fluid, Vaginal Spotting, Vision Changes, Nausea , Vomiting , Contractions, and Epigastric pain  Fetal Movement : normal  All other systems reviewed and are negative.       The additional following portions of the patient's history were reviewed and updated as appropriate: allergies and current medications.      I have reviewed and agree with the  HPI, ROS, and historical information as entered above. Selina Curtis MD      /70   Wt 67 kg (147 lb 9.6 oz)   BMI 27.89 kg/m²       EXAM:     Prenatal Vitals  BP: 118/70  Weight: 67 kg (147 lb 9.6 oz)   Fetal Heart Rate: 145/151          Urine Glucose Read-only: Negative  Urine Protein Read-only: Negative       Assessment and Plan    Problem List Items Addressed This Visit          Gravid and     Monochorionic diamniotic twin gestation in first trimester     Other Visit Diagnoses       Prenatal care in second trimester    -  Primary    Relevant Orders    POC Urinalysis Dipstick (Completed)            Pregnancy at 21w3d  U/S today reviewed.  Fetal status reassuring.   Activity and Exercise discussed.  Patient is on Prenatal vitamins  Return in about 2 weeks (around 2023).    Selina Curtis MD  2023

## 2023-11-21 ENCOUNTER — ROUTINE PRENATAL (OUTPATIENT)
Dept: OBSTETRICS AND GYNECOLOGY | Facility: CLINIC | Age: 28
End: 2023-11-21
Payer: COMMERCIAL

## 2023-11-21 ENCOUNTER — HOSPITAL ENCOUNTER (OUTPATIENT)
Dept: WOMENS IMAGING | Facility: HOSPITAL | Age: 28
Discharge: HOME | End: 2023-11-21
Admitting: OBSTETRICS & GYNECOLOGY
Payer: COMMERCIAL

## 2023-11-21 ENCOUNTER — OFFICE VISIT (OUTPATIENT)
Dept: OBSTETRICS AND GYNECOLOGY | Facility: HOSPITAL | Age: 28
End: 2023-11-21
Payer: COMMERCIAL

## 2023-11-21 VITALS — BODY MASS INDEX: 28.53 KG/M2 | WEIGHT: 151 LBS | SYSTOLIC BLOOD PRESSURE: 100 MMHG | DIASTOLIC BLOOD PRESSURE: 68 MMHG

## 2023-11-21 VITALS — DIASTOLIC BLOOD PRESSURE: 69 MMHG | SYSTOLIC BLOOD PRESSURE: 113 MMHG | WEIGHT: 151.8 LBS | BODY MASS INDEX: 28.68 KG/M2

## 2023-11-21 DIAGNOSIS — R10.13 EPIGASTRIC ABDOMINAL PAIN AFFECTING PREGNANCY: ICD-10-CM

## 2023-11-21 DIAGNOSIS — O30.031 MONOCHORIONIC DIAMNIOTIC TWIN GESTATION IN FIRST TRIMESTER: ICD-10-CM

## 2023-11-21 DIAGNOSIS — O30.031 MONOCHORIONIC DIAMNIOTIC TWIN GESTATION IN FIRST TRIMESTER: Primary | ICD-10-CM

## 2023-11-21 DIAGNOSIS — Z34.92 SECOND TRIMESTER PREGNANCY: Primary | ICD-10-CM

## 2023-11-21 DIAGNOSIS — O26.899 EPIGASTRIC ABDOMINAL PAIN AFFECTING PREGNANCY: ICD-10-CM

## 2023-11-21 LAB
GLUCOSE UR STRIP-MCNC: NEGATIVE MG/DL
PROT UR STRIP-MCNC: NEGATIVE MG/DL

## 2023-11-21 PROCEDURE — 93325 DOPPLER ECHO COLOR FLOW MAPG: CPT

## 2023-11-21 PROCEDURE — 76820 UMBILICAL ARTERY ECHO: CPT

## 2023-11-21 PROCEDURE — 76816 OB US FOLLOW-UP PER FETUS: CPT

## 2023-11-21 PROCEDURE — 76825 ECHO EXAM OF FETAL HEART: CPT

## 2023-11-21 NOTE — PROGRESS NOTES
OB FOLLOW UP  CC- Here for care of pregnancy        Shweta Canela is a 28 y.o.  23w3d patient being seen today for her obstetrical follow up visit. Patient reports epigastric pain.. 28wk instr given/A+. Pt feels well, +fm. Having right sided upper GI pain that is worse when sitting and goes away when she lays down.     Her prenatal care is complicated by (and status) :Mono/Di Twins  Patient Active Problem List   Diagnosis    Monochorionic diamniotic twin gestation in first trimester    Migraines    Anxiety    Asthma    Chronic nonintractable headache    Eye pain, left    Contraceptive management    Encounter for Papanicolaou smear of cervix    Screening for STD (sexually transmitted disease)    Depression, controlled    Moderate persistent asthma without complication    Mood swings    Motion sickness    Murmur    Non-recurrent acute suppurative otitis media of left ear without spontaneous rupture of tympanic membrane    Oral aphthous ulcer    Ulcer mouth    Serous otitis media with rupture of tympanic membrane    Migraine, intractable       Flu Status: Already given in current flu season  Ultrasound Today: Yes @ [facility]    ROS -   Patient Reports : Epigastric Pain  Patient Denies: Loss of Fluid, Vaginal Spotting, Vision Changes, Headaches, Nausea , Vomiting , and Contractions  Fetal Movement : normal  All other systems reviewed and are negative.       The additional following portions of the patient's history were reviewed and updated as appropriate: allergies and current medications.      I have reviewed and agree with the HPI, ROS, and historical information as entered above. Karen Steiner, APRN      /68   Wt 68.5 kg (151 lb)   BMI 28.53 kg/m²       EXAM:     Prenatal Vitals  BP: 100/68  Weight: 68.5 kg (151 lb)   Fetal Heart Rate: PDC         Urine Glucose Read-only: Negative  Urine Protein Read-only: Negative       Assessment and Plan    Problem List Items Addressed This Visit          Gravid  and     Monochorionic diamniotic twin gestation in first trimester    Overview     PDC ultrasound every 4 weeks  23  US with Curtis every 4 weeks (2 weeks in between pdc)  23  Start NST 2x weekly @ 32 weeks         Relevant Orders    US Ob Limited 1 + Fetuses    US Ob 14 + Weeks Each Additional Gestation     Other Visit Diagnoses       Second trimester pregnancy    -  Primary    Relevant Orders    POC Urinalysis Dipstick (Completed)    Epigastric abdominal pain affecting pregnancy        Relevant Orders    Preeclampsia Panel            Pregnancy at 23w3d  Fetal status reassuring. PDC scan today. Normal per pt. Report is pending.  PDC scan reviewed today.  1 hour gtt, CBC, Antibody screen, and TDAP next visit. Instructions given  Fetal movement/PTL or Labor precautions  Reviewed routine prenatal care with the office and educational materials given  Lab(s) Ordered-PEP r/t epigastric pain RUQ pain radiating to back  U/S ordered at follow up  Discussed/encouraged TDAP vaccination after 28 weeks  Reviewed Pre-eclampsia signs/symptoms  Discussed bASA for PIH prevention from 12 to 36wk  Reviewed Pre-eclampsia signs/symptoms-Headache not relieved by tylenol or rest, chest pain, shortness of breath, right upper quadrant pain, blurry vision. Monitor blood pressure at home call provider if above 140/90 consistently.     Follow up with Dr. Curtis in 2 weeks 23 with ultrasound, then PDC 23 with ultrasound.  Karen Steiner, APRN  2023

## 2023-11-21 NOTE — LETTER
"2023       No Recipients    Patient: Shweta Canela   YOB: 1995   Date of Visit: 2023       Dear CORRINE Solomon,    Thank you for referring Shweta Canela to me for evaluation. Below is a copy of my consult note.    If you have questions, please do not hesitate to call me. I look forward to following Shweta along with you.         Sincerely,        Meena Robles MD        CC:   No Recipients    No complaints today, Next f/u appointment with nasim today, NIPT neg         Maternal/Fetal Medicine Follow Up Note     Name: Shweta Canela    : 1995     MRN: [id number]     Referring Provider: CORRINE Solomon    Chief Complaint  M/D Twins, baby b cpc    Subjective     History of Present Illness:  Shweta Canela is a 28 y.o.  23w4d who presents today for follow up in the setting of Mo/Di twins and an incidentally noted CPC for Twin B   NIPS low risk   No interval issues     HUA: Estimated Date of Delivery: 3/16/24     ROS:   As noted in HPI.     Objective     Vital Signs  /69   Wt 68.9 kg (151 lb 12.8 oz)   Estimated body mass index is 28.68 kg/m² as calculated from the following:    Height as of 21: 154.9 cm (61\").    Weight as of this encounter: 68.9 kg (151 lb 12.8 oz).    Ultrasound Impression:   See viewpoint    Assessment and Plan     Shweta Canela is a 28 y.o.  23w4d     Diagnoses and all orders for this visit:    1. Monochorionic diamniotic twin gestation in first trimester (Primary)  Assessment & Plan:  Appropriate and concordant interval growth with normal appearing fetal echocardiograms x 2.   CPC for Twin B resolved.   No evidence of TTTS.   Follow up 2 weeks with Dr Curtis for TTTS check and 4 weeks with LOUISA for growth            Follow Up  4 weeks     I spent 20 minutes caring for the patient on the day of service. This included: obtaining or reviewing a separately obtained medical history, reviewing patient " records, performing a medically appropriate exam and/or evaluation, counseling or educating the patient/family/caregiver, ordering medications, labs, and/or procedures and documenting such in the medical record. This does not include time spent on review and interpretation of other tests such as fetal ultrasound or the performance of other procedures such as amniocentesis or CVS.    Meena Robles MD FACOG  Maternal Fetal Medicine, [facility]     2023

## 2023-11-22 LAB
ALT SERPL-CCNC: 21 IU/L (ref 0–32)
AST SERPL-CCNC: 19 IU/L (ref 0–40)
BASOPHILS # BLD AUTO: 0.1 X10E3/UL (ref 0–0.2)
BASOPHILS NFR BLD AUTO: 0 %
BUN SERPL-MCNC: 5 MG/DL (ref 6–20)
CREAT SERPL-MCNC: 0.5 MG/DL (ref 0.57–1)
CREAT UR-MCNC: ABNORMAL MG/DL
EGFRCR SERPLBLD CKD-EPI 2021: 132 ML/MIN/1.73
EOSINOPHIL # BLD AUTO: 0.2 X10E3/UL (ref 0–0.4)
EOSINOPHIL NFR BLD AUTO: 2 %
ERYTHROCYTE [DISTWIDTH] IN BLOOD BY AUTOMATED COUNT: 13 % (ref 11.7–15.4)
HCT VFR BLD AUTO: 32.8 % (ref 34–46.6)
HGB BLD-MCNC: 11.5 G/DL (ref 11.1–15.9)
IMM GRANULOCYTES # BLD AUTO: 0.2 X10E3/UL (ref 0–0.1)
IMM GRANULOCYTES NFR BLD AUTO: 1 %
LDH SERPL L TO P-CCNC: 180 IU/L (ref 119–226)
LYMPHOCYTES # BLD AUTO: 2.9 X10E3/UL (ref 0.7–3.1)
LYMPHOCYTES NFR BLD AUTO: 21 %
MCH RBC QN AUTO: 34 PG (ref 26.6–33)
MCHC RBC AUTO-ENTMCNC: 35.1 G/DL (ref 31.5–35.7)
MCV RBC AUTO: 97 FL (ref 79–97)
MICROALBUMIN UR-MCNC: ABNORMAL
MONOCYTES # BLD AUTO: 1.1 X10E3/UL (ref 0.1–0.9)
MONOCYTES NFR BLD AUTO: 8 %
NEUTROPHILS # BLD AUTO: 9.1 X10E3/UL (ref 1.4–7)
NEUTROPHILS NFR BLD AUTO: 68 %
PLATELET # BLD AUTO: 228 X10E3/UL (ref 150–450)
RBC # BLD AUTO: 3.38 X10E6/UL (ref 3.77–5.28)
URATE SERPL-MCNC: 3.3 MG/DL (ref 2.6–6.2)
WBC # BLD AUTO: 13.4 X10E3/UL (ref 3.4–10.8)

## 2023-11-22 NOTE — PROGRESS NOTES
"    Maternal/Fetal Medicine Follow Up Note     Name: Shweta Canela    : 1995     MRN: [id number]     Referring Provider: CORRINE Solomon    Chief Complaint  M/D Twins, baby b cpc    Subjective     History of Present Illness:  Shweta Canela is a 28 y.o.  23w4d who presents today for follow up in the setting of Mo/Di twins and an incidentally noted CPC for Twin B   NIPS low risk   No interval issues     HUA: Estimated Date of Delivery: 3/16/24     ROS:   As noted in HPI.     Objective     Vital Signs  /69   Wt 68.9 kg (151 lb 12.8 oz)   Estimated body mass index is 28.68 kg/m² as calculated from the following:    Height as of 21: 154.9 cm (61\").    Weight as of this encounter: 68.9 kg (151 lb 12.8 oz).    Ultrasound Impression:   See viewpoint    Assessment and Plan     Shweta Canela is a 28 y.o.  23w4d     Diagnoses and all orders for this visit:    1. Monochorionic diamniotic twin gestation in first trimester (Primary)  Assessment & Plan:  Appropriate and concordant interval growth with normal appearing fetal echocardiograms x 2.   CPC for Twin B resolved.   No evidence of TTTS.   Follow up 2 weeks with Dr Curtis for TTTS check and 4 weeks with LOUISA for growth            Follow Up  4 weeks     I spent 20 minutes caring for the patient on the day of service. This included: obtaining or reviewing a separately obtained medical history, reviewing patient records, performing a medically appropriate exam and/or evaluation, counseling or educating the patient/family/caregiver, ordering medications, labs, and/or procedures and documenting such in the medical record. This does not include time spent on review and interpretation of other tests such as fetal ultrasound or the performance of other procedures such as amniocentesis or CVS.    Meena Robles MD FACOG  Maternal Fetal Medicine, [facility]     2023  "

## 2023-11-22 NOTE — ASSESSMENT & PLAN NOTE
Appropriate and concordant interval growth with normal appearing fetal echocardiograms x 2.   CPC for Twin B resolved.   No evidence of TTTS.   Follow up 2 weeks with Dr Curtis for TTTS check and 4 weeks with LISETH for growth

## 2023-11-30 ENCOUNTER — ROUTINE PRENATAL (OUTPATIENT)
Dept: OBSTETRICS AND GYNECOLOGY | Facility: CLINIC | Age: 28
End: 2023-11-30
Payer: OTHER GOVERNMENT

## 2023-11-30 ENCOUNTER — HOSPITAL ENCOUNTER (OUTPATIENT)
Facility: HOSPITAL | Age: 28
Discharge: HOME | End: 2023-12-04
Attending: OBSTETRICS & GYNECOLOGY | Admitting: OBSTETRICS & GYNECOLOGY
Payer: COMMERCIAL

## 2023-11-30 VITALS — DIASTOLIC BLOOD PRESSURE: 72 MMHG | BODY MASS INDEX: 29.4 KG/M2 | WEIGHT: 155.6 LBS | SYSTOLIC BLOOD PRESSURE: 110 MMHG

## 2023-11-30 DIAGNOSIS — O30.031 MONOCHORIONIC DIAMNIOTIC TWIN GESTATION IN FIRST TRIMESTER: ICD-10-CM

## 2023-11-30 DIAGNOSIS — O26.879 ANTEPARTUM CERVICAL SHORTENING: ICD-10-CM

## 2023-11-30 DIAGNOSIS — K21.9 GASTROESOPHAGEAL REFLUX DISEASE, UNSPECIFIED WHETHER ESOPHAGITIS PRESENT: ICD-10-CM

## 2023-11-30 DIAGNOSIS — R10.11 RUQ ABDOMINAL PAIN: ICD-10-CM

## 2023-11-30 DIAGNOSIS — Z34.92 SECOND TRIMESTER PREGNANCY: Primary | ICD-10-CM

## 2023-11-30 LAB
ABO GROUP BLD: NORMAL
ALBUMIN SERPL-MCNC: 3.5 G/DL (ref 3.5–5.2)
ALBUMIN/GLOB SERPL: 1.2 G/DL
ALP SERPL-CCNC: 62 U/L (ref 39–117)
ALT SERPL W P-5'-P-CCNC: 41 U/L (ref 1–33)
ANION GAP SERPL CALCULATED.3IONS-SCNC: 12 MMOL/L (ref 5–15)
AST SERPL-CCNC: 25 U/L (ref 1–32)
BASOPHILS # BLD AUTO: 0.05 10*3/MM3 (ref 0–0.2)
BASOPHILS NFR BLD AUTO: 0.3 % (ref 0–1.5)
BILIRUB SERPL-MCNC: 0.2 MG/DL (ref 0–1.2)
BILIRUB UR QL STRIP: NEGATIVE
BLD GP AB SCN SERPL QL: NEGATIVE
BUN SERPL-MCNC: 8 MG/DL (ref 6–20)
BUN/CREAT SERPL: 16 (ref 7–25)
CALCIUM SPEC-SCNC: 9.3 MG/DL (ref 8.6–10.5)
CHLORIDE SERPL-SCNC: 104 MMOL/L (ref 98–107)
CLARITY UR: CLEAR
CO2 SERPL-SCNC: 21 MMOL/L (ref 22–29)
COLOR UR: YELLOW
CREAT SERPL-MCNC: 0.5 MG/DL (ref 0.57–1)
DEPRECATED RDW RBC AUTO: 47.2 FL (ref 37–54)
EGFRCR SERPLBLD CKD-EPI 2021: 132 ML/MIN/1.73
EOSINOPHIL # BLD AUTO: 0.21 10*3/MM3 (ref 0–0.4)
EOSINOPHIL NFR BLD AUTO: 1.5 % (ref 0.3–6.2)
ERYTHROCYTE [DISTWIDTH] IN BLOOD BY AUTOMATED COUNT: 13.4 % (ref 12.3–15.4)
GLOBULIN UR ELPH-MCNC: 2.9 GM/DL
GLUCOSE SERPL-MCNC: 79 MG/DL (ref 65–99)
GLUCOSE UR STRIP-MCNC: NEGATIVE MG/DL
GLUCOSE UR STRIP-MCNC: NEGATIVE MG/DL
HCT VFR BLD AUTO: 30.2 % (ref 34–46.6)
HGB BLD-MCNC: 10.6 G/DL (ref 12–15.9)
HGB UR QL STRIP.AUTO: NEGATIVE
IMM GRANULOCYTES # BLD AUTO: 0.14 10*3/MM3 (ref 0–0.05)
IMM GRANULOCYTES NFR BLD AUTO: 1 % (ref 0–0.5)
KETONES UR QL STRIP: ABNORMAL
LEUKOCYTE ESTERASE UR QL STRIP.AUTO: NEGATIVE
LYMPHOCYTES # BLD AUTO: 3.23 10*3/MM3 (ref 0.7–3.1)
LYMPHOCYTES NFR BLD AUTO: 22.6 % (ref 19.6–45.3)
MCH RBC QN AUTO: 34.3 PG (ref 26.6–33)
MCHC RBC AUTO-ENTMCNC: 35.1 G/DL (ref 31.5–35.7)
MCV RBC AUTO: 97.7 FL (ref 79–97)
MONOCYTES # BLD AUTO: 1.14 10*3/MM3 (ref 0.1–0.9)
MONOCYTES NFR BLD AUTO: 8 % (ref 5–12)
NEUTROPHILS NFR BLD AUTO: 66.6 % (ref 42.7–76)
NEUTROPHILS NFR BLD AUTO: 9.52 10*3/MM3 (ref 1.7–7)
NITRITE UR QL STRIP: NEGATIVE
NRBC BLD AUTO-RTO: 0 /100 WBC (ref 0–0.2)
PH UR STRIP.AUTO: 7 [PH] (ref 5–8)
PLATELET # BLD AUTO: 203 10*3/MM3 (ref 140–450)
PMV BLD AUTO: 9.9 FL (ref 6–12)
POTASSIUM SERPL-SCNC: 3.6 MMOL/L (ref 3.5–5.2)
PROT SERPL-MCNC: 6.4 G/DL (ref 6–8.5)
PROT UR QL STRIP: NEGATIVE
PROT UR STRIP-MCNC: NEGATIVE MG/DL
RBC # BLD AUTO: 3.09 10*6/MM3 (ref 3.77–5.28)
RH BLD: POSITIVE
SODIUM SERPL-SCNC: 137 MMOL/L (ref 136–145)
SP GR UR STRIP: 1.01 (ref 1–1.03)
T&S EXPIRATION DATE: NORMAL
UROBILINOGEN UR QL STRIP: ABNORMAL
WBC NRBC COR # BLD AUTO: 14.29 10*3/MM3 (ref 3.4–10.8)

## 2023-11-30 PROCEDURE — 86901 BLOOD TYPING SEROLOGIC RH(D): CPT | Performed by: OBSTETRICS & GYNECOLOGY

## 2023-11-30 PROCEDURE — 80053 COMPREHEN METABOLIC PANEL: CPT | Performed by: OBSTETRICS & GYNECOLOGY

## 2023-11-30 PROCEDURE — 25010000002 DEXAMETHASONE PER 1 MG: Performed by: NURSE PRACTITIONER

## 2023-11-30 PROCEDURE — 85025 COMPLETE CBC W/AUTO DIFF WBC: CPT | Performed by: OBSTETRICS & GYNECOLOGY

## 2023-11-30 PROCEDURE — 59025 FETAL NON-STRESS TEST: CPT

## 2023-11-30 PROCEDURE — G0378 HOSPITAL OBSERVATION PER HR: HCPCS

## 2023-11-30 PROCEDURE — 96372 THER/PROPH/DIAG INJ SC/IM: CPT

## 2023-11-30 PROCEDURE — 25810000003 LACTATED RINGERS PER 1000 ML

## 2023-11-30 PROCEDURE — 87086 URINE CULTURE/COLONY COUNT: CPT | Performed by: NURSE PRACTITIONER

## 2023-11-30 PROCEDURE — 86900 BLOOD TYPING SEROLOGIC ABO: CPT | Performed by: OBSTETRICS & GYNECOLOGY

## 2023-11-30 PROCEDURE — 96361 HYDRATE IV INFUSION ADD-ON: CPT

## 2023-11-30 PROCEDURE — G0463 HOSPITAL OUTPT CLINIC VISIT: HCPCS

## 2023-11-30 PROCEDURE — 96375 TX/PRO/DX INJ NEW DRUG ADDON: CPT

## 2023-11-30 PROCEDURE — 25010000002 MAGNESIUM SULFATE PER 500 MG OF MAGNESIUM

## 2023-11-30 PROCEDURE — 81003 URINALYSIS AUTO W/O SCOPE: CPT | Performed by: NURSE PRACTITIONER

## 2023-11-30 PROCEDURE — 86850 RBC ANTIBODY SCREEN: CPT | Performed by: OBSTETRICS & GYNECOLOGY

## 2023-11-30 PROCEDURE — 25010000002 ONDANSETRON PER 1 MG: Performed by: OBSTETRICS & GYNECOLOGY

## 2023-11-30 RX ORDER — ACETAMINOPHEN 325 MG/1
650 TABLET ORAL EVERY 4 HOURS PRN
OUTPATIENT
Start: 2023-11-30

## 2023-11-30 RX ORDER — ONDANSETRON 2 MG/ML
4 INJECTION INTRAMUSCULAR; INTRAVENOUS EVERY 6 HOURS PRN
Status: DISCONTINUED | OUTPATIENT
Start: 2023-11-30 | End: 2023-12-04 | Stop reason: HOSPADM

## 2023-11-30 RX ORDER — LIDOCAINE HYDROCHLORIDE 10 MG/ML
0.5 INJECTION, SOLUTION EPIDURAL; INFILTRATION; INTRACAUDAL; PERINEURAL ONCE AS NEEDED
OUTPATIENT
Start: 2023-11-30

## 2023-11-30 RX ORDER — ONDANSETRON 2 MG/ML
4 INJECTION INTRAMUSCULAR; INTRAVENOUS EVERY 8 HOURS PRN
OUTPATIENT
Start: 2023-11-30

## 2023-11-30 RX ORDER — BISACODYL 10 MG
10 SUPPOSITORY, RECTAL RECTAL DAILY PRN
OUTPATIENT
Start: 2023-11-30

## 2023-11-30 RX ORDER — FAMOTIDINE 20 MG/1
20 TABLET, FILM COATED ORAL 2 TIMES DAILY
Qty: 60 TABLET | Refills: 3 | Status: SHIPPED | OUTPATIENT
Start: 2023-11-30

## 2023-11-30 RX ORDER — MAGNESIUM SULFATE HEPTAHYDRATE 40 MG/ML
2 INJECTION, SOLUTION INTRAVENOUS CONTINUOUS
Status: DISCONTINUED | OUTPATIENT
Start: 2023-11-30 | End: 2023-11-30

## 2023-11-30 RX ORDER — ONDANSETRON 4 MG/1
8 TABLET, FILM COATED ORAL EVERY 8 HOURS PRN
OUTPATIENT
Start: 2023-11-30

## 2023-11-30 RX ORDER — DEXTROSE AND SODIUM CHLORIDE 5; .2 G/100ML; G/100ML
75 INJECTION, SOLUTION INTRAVENOUS CONTINUOUS
Status: DISCONTINUED | OUTPATIENT
Start: 2023-11-30 | End: 2023-12-04 | Stop reason: HOSPADM

## 2023-11-30 RX ORDER — SODIUM CHLORIDE 0.9 % (FLUSH) 0.9 %
10 SYRINGE (ML) INJECTION EVERY 12 HOURS SCHEDULED
OUTPATIENT
Start: 2023-11-30

## 2023-11-30 RX ORDER — SODIUM CHLORIDE 9 MG/ML
40 INJECTION, SOLUTION INTRAVENOUS AS NEEDED
OUTPATIENT
Start: 2023-11-30

## 2023-11-30 RX ORDER — DEXAMETHASONE SODIUM PHOSPHATE 10 MG/ML
6 INJECTION INTRAMUSCULAR; INTRAVENOUS 2 TIMES DAILY
Qty: 4 ML | Refills: 0 | Status: COMPLETED | OUTPATIENT
Start: 2023-11-30 | End: 2023-12-02

## 2023-11-30 RX ORDER — MAGNESIUM SULFATE HEPTAHYDRATE 40 MG/ML
1 INJECTION, SOLUTION INTRAVENOUS CONTINUOUS
Status: DISCONTINUED | OUTPATIENT
Start: 2023-11-30 | End: 2023-12-01

## 2023-11-30 RX ORDER — SODIUM CHLORIDE 0.9 % (FLUSH) 0.9 %
10 SYRINGE (ML) INJECTION AS NEEDED
OUTPATIENT
Start: 2023-11-30

## 2023-11-30 RX ORDER — FAMOTIDINE 10 MG/ML
20 INJECTION, SOLUTION INTRAVENOUS 2 TIMES DAILY
Status: DISCONTINUED | OUTPATIENT
Start: 2023-12-01 | End: 2023-12-04 | Stop reason: HOSPADM

## 2023-11-30 RX ORDER — DOCUSATE SODIUM 100 MG/1
100 CAPSULE, LIQUID FILLED ORAL 2 TIMES DAILY PRN
OUTPATIENT
Start: 2023-11-30

## 2023-11-30 RX ADMIN — ONDANSETRON 4 MG: 2 INJECTION INTRAMUSCULAR; INTRAVENOUS at 20:23

## 2023-11-30 RX ADMIN — DEXAMETHASONE SODIUM PHOSPHATE 6 MG: 10 INJECTION INTRAMUSCULAR; INTRAVENOUS at 20:27

## 2023-11-30 RX ADMIN — DEXTROSE AND SODIUM CHLORIDE 100 ML/HR: 5; 200 INJECTION, SOLUTION INTRAVENOUS at 20:00

## 2023-11-30 RX ADMIN — MAGNESIUM SULFATE HEPTAHYDRATE 2 G: 500 INJECTION, SOLUTION INTRAMUSCULAR; INTRAVENOUS at 20:24

## 2023-11-30 RX ADMIN — FAMOTIDINE 20 MG: 10 INJECTION, SOLUTION INTRAVENOUS at 23:58

## 2023-11-30 NOTE — H&P
Subjective   Shweta Canela is a 28 y.o.  24w5d patient being seen today for RUQ pain and R sided mid back pain. She states the pain is constant and notices it increase when she eats. She states it is keeping her from being able to sleep and work and is not relieved with tylenol. She states it has been occurring for a few weeks but has gotten worse over the last few days.  She has worsening GERD, elena at night.  She denies bleeding, other pain, pressure, dysuria, bowel changes, fever, nausea.  She has noticed lots of tightening in her abdomen.       Her prenatal care is complicated by (and status) :        Patient Active Problem List   Diagnosis    Monochorionic diamniotic twin gestation in first trimester    Migraines    Anxiety    Asthma    Chronic nonintractable headache    Eye pain, left    Contraceptive management    Encounter for Papanicolaou smear of cervix    Screening for STD (sexually transmitted disease)    Depression, controlled    Moderate persistent asthma without complication    Mood swings    Motion sickness    Murmur    Non-recurrent acute suppurative otitis media of left ear without spontaneous rupture of tympanic membrane    Oral aphthous ulcer    Ulcer mouth    Serous otitis media with rupture of tympanic membrane    Migraine, intractable            Ultrasound Today: No.     ROS -   Patient Reports :  RUQ and back pain  Patient Denies: Loss of Fluid, Vaginal Spotting, Vision Changes, Headaches, Nausea , Vomiting , Contractions, and Epigastric pain  Fetal Movement : normal  All other systems reviewed and are negative.         The additional following portions of the patient's history were reviewed and updated as appropriate: allergies and current medications.     I have reviewed and agree with the HPI, ROS, and historical information as entered above. Chitra Vega, APRN        /72   Wt 70.6 kg (155 lb 9.6 oz)   BMI 29.40 kg/m²         EXAM:      Prenatal Vitals  BP:  110/72  Weight: 70.6 kg (155 lb 9.6 oz)   Fetal Heart Rate: US      Cervix :  closed              Assessment   Assessment and Plan     Problem List Items Addressed This Visit                  Gravid and      Monochorionic diamniotic twin gestation in first trimester     Overview       PDC ultrasound every 4 weeks  23  US with Curtis every 4 weeks (2 weeks in between pdc)  23  Start NST 2x weekly @ 32 weeks           Relevant Orders     US Ob Limited 1 + Fetuses     US Gallbladder      Other Visit Diagnoses         Second trimester pregnancy    -  Primary     Relevant Medications     famotidine (Pepcid) 20 MG tablet     Other Relevant Orders     POC Urinalysis Dipstick (Completed)     US Ob Limited 1 + Fetuses     RUQ abdominal pain         Relevant Medications     famotidine (Pepcid) 20 MG tablet     Other Relevant Orders     US Ob Limited 1 + Fetuses     US Gallbladder     Gastroesophageal reflux disease, unspecified whether esophagitis present         Relevant Medications     famotidine (Pepcid) 20 MG tablet     Other Relevant Orders     US Gallbladder     Antepartum cervical shortening                    Pregnancy at 24w5d  Fetal status reassuring.   S/s c/w GI/GB issues.  Rev diet mod, Pepcid bid.  GB US.  Cx length today 2.3 cm--- PDC noted 4 cm on .    D/w LOS--- to [facility] for monitoring for contractions, CCUA, steroids, PDC US in am.  If ctx, Mag.  Rev with pt; she v/u.  Escorted to [facility] and notified charge nurse.     Chitra Vega, APRN  2023

## 2023-11-30 NOTE — TELEPHONE ENCOUNTER
Patient called and is in a lot of pain, said she had talked to the doctor last visit about this but its still hurting, said its around her ribs and back, she can't sleep at night

## 2023-11-30 NOTE — PROGRESS NOTES
OB FOLLOW UP  CC- Here for care of pregnancy        Shweta Canela is a 28 y.o.  24w5d patient being seen today for RUQ pain and R sided mid back pain. She states the pain is constant and notices it increase when she eats. She states it is keeping her from being able to sleep and work and is not relieved with tylenol. She states it has been occurring for a few weeks but has gotten worse over the last few days.  She has worsening GERD, elena at night.  She denies bleeding, other pain, pressure, dysuria, bowel changes, fever, nausea.  She has noticed lots of tightening in her abdomen.      Her prenatal care is complicated by (and status) :    Patient Active Problem List   Diagnosis    Monochorionic diamniotic twin gestation in first trimester    Migraines    Anxiety    Asthma    Chronic nonintractable headache    Eye pain, left    Contraceptive management    Encounter for Papanicolaou smear of cervix    Screening for STD (sexually transmitted disease)    Depression, controlled    Moderate persistent asthma without complication    Mood swings    Motion sickness    Murmur    Non-recurrent acute suppurative otitis media of left ear without spontaneous rupture of tympanic membrane    Oral aphthous ulcer    Ulcer mouth    Serous otitis media with rupture of tympanic membrane    Migraine, intractable         Ultrasound Today: No.    ROS -   Patient Reports :  RUQ and back pain  Patient Denies: Loss of Fluid, Vaginal Spotting, Vision Changes, Headaches, Nausea , Vomiting , Contractions, and Epigastric pain  Fetal Movement : normal  All other systems reviewed and are negative.       The additional following portions of the patient's history were reviewed and updated as appropriate: allergies and current medications.    I have reviewed and agree with the HPI, ROS, and historical information as entered above. Chitra Vega, APRN      /72   Wt 70.6 kg (155 lb 9.6 oz)   BMI 29.40 kg/m²       EXAM:      Prenatal Vitals  BP: 110/72  Weight: 70.6 kg (155 lb 9.6 oz)   Fetal Heart Rate: US   Dilation/Effacement/Station  Dilation: Closed   Cervix :  closed         Assessment and Plan    Problem List Items Addressed This Visit          Gravid and     Monochorionic diamniotic twin gestation in first trimester    Overview     PDC ultrasound every 4 weeks  23  US with Curtis every 4 weeks (2 weeks in between pdc)  23  Start NST 2x weekly @ 32 weeks         Relevant Orders    US Ob Limited 1 + Fetuses    US Gallbladder     Other Visit Diagnoses       Second trimester pregnancy    -  Primary    Relevant Medications    famotidine (Pepcid) 20 MG tablet    Other Relevant Orders    POC Urinalysis Dipstick (Completed)    US Ob Limited 1 + Fetuses    RUQ abdominal pain        Relevant Medications    famotidine (Pepcid) 20 MG tablet    Other Relevant Orders    US Ob Limited 1 + Fetuses    US Gallbladder    Gastroesophageal reflux disease, unspecified whether esophagitis present        Relevant Medications    famotidine (Pepcid) 20 MG tablet    Other Relevant Orders    US Gallbladder    Antepartum cervical shortening                Pregnancy at 24w5d  Fetal status reassuring.   S/s c/w GI/GB issues.  Rev diet mod, Pepcid bid.  GB US.  Cx length today 2.3 cm--- PDC noted 4 cm on .    D/w LOS--- to [facility] for monitoring for contractions, CCUA, steroids, PDC US in am.  If ctx, Mag.  Rev with pt; she v/u.  Escorted to [facility] and notified charge nurse.    Chitra Vega, APRN  2023

## 2023-12-01 ENCOUNTER — APPOINTMENT (OUTPATIENT)
Dept: WOMENS IMAGING | Facility: HOSPITAL | Age: 28
End: 2023-12-01
Payer: COMMERCIAL

## 2023-12-01 PROCEDURE — 25010000002 DEXAMETHASONE PER 1 MG: Performed by: NURSE PRACTITIONER

## 2023-12-01 PROCEDURE — 96372 THER/PROPH/DIAG INJ SC/IM: CPT

## 2023-12-01 PROCEDURE — 76815 OB US LIMITED FETUS(S): CPT

## 2023-12-01 PROCEDURE — 25010000002 MAGNESIUM SULFATE PER 500 MG OF MAGNESIUM: Performed by: OBSTETRICS & GYNECOLOGY

## 2023-12-01 PROCEDURE — 59025 FETAL NON-STRESS TEST: CPT

## 2023-12-01 PROCEDURE — 25810000003 LACTATED RINGERS PER 1000 ML: Performed by: OBSTETRICS & GYNECOLOGY

## 2023-12-01 PROCEDURE — 96366 THER/PROPH/DIAG IV INF ADDON: CPT

## 2023-12-01 PROCEDURE — 76815 OB US LIMITED FETUS(S): CPT | Performed by: OBSTETRICS & GYNECOLOGY

## 2023-12-01 PROCEDURE — 76817 TRANSVAGINAL US OBSTETRIC: CPT | Performed by: OBSTETRICS & GYNECOLOGY

## 2023-12-01 PROCEDURE — G0378 HOSPITAL OBSERVATION PER HR: HCPCS

## 2023-12-01 PROCEDURE — 96376 TX/PRO/DX INJ SAME DRUG ADON: CPT

## 2023-12-01 PROCEDURE — 96365 THER/PROPH/DIAG IV INF INIT: CPT

## 2023-12-01 PROCEDURE — 96361 HYDRATE IV INFUSION ADD-ON: CPT

## 2023-12-01 PROCEDURE — 76817 TRANSVAGINAL US OBSTETRIC: CPT

## 2023-12-01 RX ORDER — PRENATAL VIT/IRON FUM/FOLIC AC 27MG-0.8MG
1 TABLET ORAL DAILY
Status: DISCONTINUED | OUTPATIENT
Start: 2023-12-01 | End: 2023-12-02

## 2023-12-01 RX ORDER — MAGNESIUM SULFATE HEPTAHYDRATE 40 MG/ML
1 INJECTION, SOLUTION INTRAVENOUS CONTINUOUS
Status: DISCONTINUED | OUTPATIENT
Start: 2023-12-01 | End: 2023-12-01

## 2023-12-01 RX ORDER — ZOLPIDEM TARTRATE 5 MG/1
5 TABLET ORAL NIGHTLY PRN
Status: DISCONTINUED | OUTPATIENT
Start: 2023-12-01 | End: 2023-12-04 | Stop reason: HOSPADM

## 2023-12-01 RX ORDER — ACETAMINOPHEN 500 MG
1000 TABLET ORAL EVERY 6 HOURS PRN
Status: DISCONTINUED | OUTPATIENT
Start: 2023-12-01 | End: 2023-12-04 | Stop reason: HOSPADM

## 2023-12-01 RX ORDER — INDOMETHACIN 25 MG/1
50 CAPSULE ORAL ONCE
Status: COMPLETED | OUTPATIENT
Start: 2023-12-01 | End: 2023-12-01

## 2023-12-01 RX ORDER — ASPIRIN 81 MG/1
81 TABLET, CHEWABLE ORAL DAILY
Status: DISCONTINUED | OUTPATIENT
Start: 2023-12-01 | End: 2023-12-03

## 2023-12-01 RX ORDER — INDOMETHACIN 25 MG/1
50 CAPSULE ORAL ONCE
Status: DISCONTINUED | OUTPATIENT
Start: 2023-12-01 | End: 2023-12-01

## 2023-12-01 RX ADMIN — PRENATAL VITAMINS-IRON FUMARATE 27 MG IRON-FOLIC ACID 0.8 MG TABLET 1 TABLET: at 20:31

## 2023-12-01 RX ADMIN — DEXTROSE AND SODIUM CHLORIDE 75 ML/HR: 5; 200 INJECTION, SOLUTION INTRAVENOUS at 14:32

## 2023-12-01 RX ADMIN — MAGNESIUM SULFATE HEPTAHYDRATE 1 G/HR: 500 INJECTION, SOLUTION INTRAMUSCULAR; INTRAVENOUS at 06:17

## 2023-12-01 RX ADMIN — ZOLPIDEM TARTRATE 5 MG: 5 TABLET ORAL at 20:31

## 2023-12-01 RX ADMIN — INDOMETHACIN 50 MG: 25 CAPSULE ORAL at 06:11

## 2023-12-01 RX ADMIN — ACETAMINOPHEN 1000 MG: 500 TABLET ORAL at 13:53

## 2023-12-01 RX ADMIN — DEXAMETHASONE SODIUM PHOSPHATE 6 MG: 10 INJECTION INTRAMUSCULAR; INTRAVENOUS at 08:23

## 2023-12-01 RX ADMIN — DEXTROSE AND SODIUM CHLORIDE 100 ML/HR: 5; 200 INJECTION, SOLUTION INTRAVENOUS at 05:26

## 2023-12-01 RX ADMIN — MAGNESIUM SULFATE HEPTAHYDRATE 2 G/HR: 500 INJECTION, SOLUTION INTRAMUSCULAR; INTRAVENOUS at 23:36

## 2023-12-01 RX ADMIN — DEXAMETHASONE SODIUM PHOSPHATE 6 MG: 10 INJECTION INTRAMUSCULAR; INTRAVENOUS at 20:32

## 2023-12-01 RX ADMIN — ASPIRIN 81 MG CHEWABLE TABLET 81 MG: 81 TABLET CHEWABLE at 20:31

## 2023-12-01 RX ADMIN — FAMOTIDINE 20 MG: 10 INJECTION, SOLUTION INTRAVENOUS at 20:32

## 2023-12-01 RX ADMIN — FAMOTIDINE 20 MG: 10 INJECTION, SOLUTION INTRAVENOUS at 08:26

## 2023-12-01 NOTE — PROGRESS NOTES
FHR patterns reassuring however contractions noted every 3-5 minutes.  Given shortening of cervix < 2.5 cm and twins will start magnesium during steroid administration.  Restrict IVF rate  Intermittent FHR monitoring.   Dr. Amos on call and aware.

## 2023-12-01 NOTE — PROGRESS NOTES
Maternal-Fetal Medicine   Progress Note    Patient name: Shweta Canela  YOB: 1995   MRN: [id number]  Admission Date: 2023  Date of Service: 2023  Referring Provider: Selina Curtis       Shweta Canela is a 28 y.o.    at 24w6d  admitted on 2023 for Cervical shortening affecting pregnancy    Hospital day 0    No chief complaint on file.      Diagnoses:     Cervical shortening affecting pregnancy       Subjective:      Shweta has no complaints today.   Patient denies  headache:   Patient denies  right upper quadrant pain:   Reports fetal movement is normal  Denies leakage of amniotic fluid.  Denies vaginal bleeding    Objective:     Vital signs:  Temp:  [97.8 °F (36.6 °C)-98 °F (36.7 °C)] 97.9 °F (36.6 °C)  Heart Rate:  [] 75  Resp:  [16-18] 16  BP: ()/(53-74) 111/66    Abdomen: soft, nontender  Uterus: gravid, nontender  Extremities: nontender; no edema     Fetal heart rate tracing review  FHT's: Category 1  TOCO: none    Cervix: last check      Most recent ultrasound: 2023 apparently things look good with both babies.  Official results not back yet cervix 1.8 cm however    Medications:  dexamethasone, 6 mg, Intramuscular, BID  famotidine, 20 mg, Intravenous, BID         ondansetron    Labs:  Lab Results   Component Value Date    HGB 10.6 (L) 2023     Lab Results   Component Value Date    GLUCOSE 79 2023         Assessment/Plan:      Shweta is a 28 y.o.    at 24w6d.  1.   Active Hospital Problems    Diagnosis     **Cervical shortening affecting pregnancy       2.  Patient feels no contractions and cervix is definitely shortened.  Will need continued rest and monitoring.  3.   4.     Plan:   1.  Continue steroids magnesium and monitoring.  2.   3.   4.     I spent 15 minutes with this patient of which greater than 50% was face to face counseling and coordination of care.  All questions were answered to the best of my ablity.    Comfort  MD Dandre  12/1/2023  08:58 EST

## 2023-12-02 LAB
ALP SERPL-CCNC: 65 U/L (ref 39–117)
ALT SERPL W P-5'-P-CCNC: 27 U/L (ref 1–33)
AST SERPL-CCNC: 15 U/L (ref 1–32)
BACTERIA SPEC AEROBE CULT: NORMAL
BASOPHILS # BLD AUTO: 0.02 10*3/MM3 (ref 0–0.2)
BASOPHILS NFR BLD AUTO: 0.1 % (ref 0–1.5)
BILIRUB SERPL-MCNC: <0.2 MG/DL (ref 0–1.2)
CREAT SERPL-MCNC: 0.51 MG/DL (ref 0.57–1)
DEPRECATED RDW RBC AUTO: 48.3 FL (ref 37–54)
EOSINOPHIL # BLD AUTO: 0 10*3/MM3 (ref 0–0.4)
EOSINOPHIL NFR BLD AUTO: 0 % (ref 0.3–6.2)
ERYTHROCYTE [DISTWIDTH] IN BLOOD BY AUTOMATED COUNT: 13.3 % (ref 12.3–15.4)
HCT VFR BLD AUTO: 28.5 % (ref 34–46.6)
HGB BLD-MCNC: 9.8 G/DL (ref 12–15.9)
IMM GRANULOCYTES # BLD AUTO: 0.27 10*3/MM3 (ref 0–0.05)
IMM GRANULOCYTES NFR BLD AUTO: 1.9 % (ref 0–0.5)
LDH SERPL-CCNC: 169 U/L (ref 135–214)
LYMPHOCYTES # BLD AUTO: 1.32 10*3/MM3 (ref 0.7–3.1)
LYMPHOCYTES NFR BLD AUTO: 9.2 % (ref 19.6–45.3)
MCH RBC QN AUTO: 34.1 PG (ref 26.6–33)
MCHC RBC AUTO-ENTMCNC: 34.4 G/DL (ref 31.5–35.7)
MCV RBC AUTO: 99.3 FL (ref 79–97)
MONOCYTES # BLD AUTO: 0.97 10*3/MM3 (ref 0.1–0.9)
MONOCYTES NFR BLD AUTO: 6.7 % (ref 5–12)
NEUTROPHILS NFR BLD AUTO: 11.84 10*3/MM3 (ref 1.7–7)
NEUTROPHILS NFR BLD AUTO: 82.1 % (ref 42.7–76)
NRBC BLD AUTO-RTO: 0 /100 WBC (ref 0–0.2)
PLATELET # BLD AUTO: 195 10*3/MM3 (ref 140–450)
PMV BLD AUTO: 10.1 FL (ref 6–12)
RBC # BLD AUTO: 2.87 10*6/MM3 (ref 3.77–5.28)
URATE SERPL-MCNC: 2.9 MG/DL (ref 2.4–5.7)
WBC NRBC COR # BLD AUTO: 14.42 10*3/MM3 (ref 3.4–10.8)

## 2023-12-02 PROCEDURE — G0378 HOSPITAL OBSERVATION PER HR: HCPCS

## 2023-12-02 PROCEDURE — 84075 ASSAY ALKALINE PHOSPHATASE: CPT | Performed by: OBSTETRICS & GYNECOLOGY

## 2023-12-02 PROCEDURE — 59025 FETAL NON-STRESS TEST: CPT

## 2023-12-02 PROCEDURE — 96372 THER/PROPH/DIAG INJ SC/IM: CPT

## 2023-12-02 PROCEDURE — 25010000002 MAGNESIUM SULFATE PER 500 MG OF MAGNESIUM: Performed by: OBSTETRICS & GYNECOLOGY

## 2023-12-02 PROCEDURE — 83615 LACTATE (LD) (LDH) ENZYME: CPT | Performed by: OBSTETRICS & GYNECOLOGY

## 2023-12-02 PROCEDURE — 84460 ALANINE AMINO (ALT) (SGPT): CPT | Performed by: OBSTETRICS & GYNECOLOGY

## 2023-12-02 PROCEDURE — 25010000002 DEXAMETHASONE PER 1 MG: Performed by: NURSE PRACTITIONER

## 2023-12-02 PROCEDURE — 85025 COMPLETE CBC W/AUTO DIFF WBC: CPT | Performed by: OBSTETRICS & GYNECOLOGY

## 2023-12-02 PROCEDURE — 84550 ASSAY OF BLOOD/URIC ACID: CPT | Performed by: OBSTETRICS & GYNECOLOGY

## 2023-12-02 PROCEDURE — 25810000003 LACTATED RINGERS PER 1000 ML: Performed by: OBSTETRICS & GYNECOLOGY

## 2023-12-02 PROCEDURE — 84450 TRANSFERASE (AST) (SGOT): CPT | Performed by: OBSTETRICS & GYNECOLOGY

## 2023-12-02 PROCEDURE — 82247 BILIRUBIN TOTAL: CPT | Performed by: OBSTETRICS & GYNECOLOGY

## 2023-12-02 PROCEDURE — 96366 THER/PROPH/DIAG IV INF ADDON: CPT

## 2023-12-02 PROCEDURE — 96376 TX/PRO/DX INJ SAME DRUG ADON: CPT

## 2023-12-02 PROCEDURE — 82565 ASSAY OF CREATININE: CPT | Performed by: OBSTETRICS & GYNECOLOGY

## 2023-12-02 RX ORDER — PRENATAL VIT/IRON FUM/FOLIC AC 27MG-0.8MG
1 TABLET ORAL NIGHTLY
Status: DISCONTINUED | OUTPATIENT
Start: 2023-12-02 | End: 2023-12-04 | Stop reason: HOSPADM

## 2023-12-02 RX ORDER — DOCUSATE SODIUM 100 MG/1
100 CAPSULE, LIQUID FILLED ORAL 2 TIMES DAILY
Status: DISCONTINUED | OUTPATIENT
Start: 2023-12-02 | End: 2023-12-04 | Stop reason: HOSPADM

## 2023-12-02 RX ADMIN — PRENATAL VITAMINS-IRON FUMARATE 27 MG IRON-FOLIC ACID 0.8 MG TABLET 1 TABLET: at 21:33

## 2023-12-02 RX ADMIN — MAGNESIUM HYDROXIDE 10 ML: 2400 SUSPENSION ORAL at 08:48

## 2023-12-02 RX ADMIN — FAMOTIDINE 20 MG: 10 INJECTION, SOLUTION INTRAVENOUS at 21:33

## 2023-12-02 RX ADMIN — DEXTROSE AND SODIUM CHLORIDE 75 ML/HR: 5; 200 INJECTION, SOLUTION INTRAVENOUS at 16:59

## 2023-12-02 RX ADMIN — FAMOTIDINE 20 MG: 10 INJECTION, SOLUTION INTRAVENOUS at 08:54

## 2023-12-02 RX ADMIN — DEXAMETHASONE SODIUM PHOSPHATE 6 MG: 10 INJECTION INTRAMUSCULAR; INTRAVENOUS at 08:49

## 2023-12-02 RX ADMIN — DOCUSATE SODIUM 100 MG: 100 CAPSULE, LIQUID FILLED ORAL at 21:33

## 2023-12-02 RX ADMIN — MAGNESIUM SULFATE HEPTAHYDRATE 2 G/HR: 500 INJECTION, SOLUTION INTRAMUSCULAR; INTRAVENOUS at 08:49

## 2023-12-02 RX ADMIN — DOCUSATE SODIUM 100 MG: 100 CAPSULE, LIQUID FILLED ORAL at 08:45

## 2023-12-02 RX ADMIN — MAGNESIUM SULFATE HEPTAHYDRATE 2 G/HR: 500 INJECTION, SOLUTION INTRAMUSCULAR; INTRAVENOUS at 21:33

## 2023-12-02 NOTE — CONSULTS
??A NICU consult was requested by OB/MFM for this pregnancy of 25w0d weeks gestation due to concerns regarding DEEJAY, short cervix and mono-di twin gestation.      The mother, Shweta Canela , is a 28 y.o.  at 25w0d weeks.      She reports related to her OB history that she has had a benign mono-di twin gestation pregnancy up until she started having rib/back pain the last few weeks. She states she was told it was likely gallbladder related however it continued and she was unable to get relief from the pain. At her most recent MFM appointment she was noted to have shortened cervix measuring 2.3 cm. She was sent to APU for additional evaluation and monitoring. Once admitted to APU she was noted to be javi every 3-5 minutes and therefore was started on magnesium and Decadron. She is currently still receiving magnesium. She reports she is no longer feeling contractions.       Past Medical History:   Diagnosis Date    Chronic paroxysmal hemicrania, not intractable     Concussion     IBS (irritable bowel syndrome)     Migraines            Past Surgical History:   Procedure Laterality Date    EAR TUBES      WISDOM TOOTH EXTRACTION             Social History     Socioeconomic History    Marital status:    Tobacco Use    Smoking status: Never    Smokeless tobacco: Never   Vaping Use    Vaping Use: Never used   Substance and Sexual Activity    Alcohol use: Not Currently     Alcohol/week: 4.0 standard drinks of alcohol     Types: 4 Cans of beer per week     Comment: four beers before knowing pregnant    Drug use: No    Sexual activity: Yes     Partners: Male     Birth control/protection: Condom       Social History: Father of baby is involved.        No current facility-administered medications on file prior to encounter.     Current Outpatient Medications on File Prior to Encounter   Medication Sig Dispense Refill    acetaminophen (TYLENOL) 325 MG tablet Take  by mouth.      aspirin 81 MG EC tablet  Take 1 tablet by mouth Daily.      calcium carbonate (TUMS) 500 MG chewable tablet Chew 1 tablet As Needed for Indigestion or Heartburn.      prenatal vitamin (prenatal, CLASSIC, vitamin) tablet Take  by mouth Daily.      albuterol sulfate  (90 Base) MCG/ACT inhaler       aluminum-magnesium hydroxide-simethicone (MAALOX/MYLANTA) 200-200-20 MG/5ML suspension Take 5 mL by mouth Every 6 (Six) Hours As Needed for Indigestion or Heartburn.      famotidine (Pepcid) 20 MG tablet Take 1 tablet by mouth 2 (Two) Times a Day. 60 tablet 3           Allergies   Allergen Reactions    Bactrim [Sulfamethoxazole-Trimethoprim] Other (See Comments)     Fever, chills           Family History   Problem Relation Age of Onset    No Known Problems Mother     No Known Problems Father     Migraines Maternal Aunt     Heart attack Maternal Grandfather     Coronary artery disease Maternal Grandfather     Cancer Other         lung    No Known Problems Sister     No Known Problems Maternal Grandmother        Maternal labs:  A+ / RI / RPRNR / HBV- / HCV- / HIV- /  GBS unknown    Most recent ultrasound: 11/30/23  MCDA twins  A: vertex, MVP 7.0 cm, fetal stomach and bladder seen. Normal UA dopplers  B: breech, MVP 5.6 cm, fetal stomach and bladder seen. Normal UA dopplers  No evidence of TTTS  PHILIP normal for both  Transvaginal ultrasound shows shortened cervix at 1.63mm, funneling and debris noted.      I spoke with mother at the bedside today.  Also present was father and maternal aunt. Shweta's mother was also present via speakerphone.  Mother reports that both babies are boys with planned names of Nico and Kam.    We spent time discussing current concerns with pregnancy including extreme prematurity, DEEJAY and short cervix. I discussed with the family what the current management plan is per her OB doctors. We discussed if labor escalates and infants needed to be delivered prior to 28 weeks gestation that it would be best for them to be  delivered at [facility] where additional services are available for extreme prematurity. Family understood and agreed with this recommendation.     We then spent time discussing general resuscitative measures at this gestational age including but not limited to drying/warming/stimulating, potentially utilizing respiratory support including CPAP, intubation, and/or endotracheal surfactant administration.  I superficially explained RDS and premature lung physiology. I then explained the risk for interventricular hemorrhage and the grades and consequences of such a bleed, including long term neurodevelopmental delay.  I then briefly outlined the pathophysiology, risk factors, and treatments for retinopathy of prematurity.      We discussed overall mortality and morbidity as they relate to the gestational age and discussed the potential for moderate to severe disabilities as consequences of prematurity.        Mother and father verbalized understanding and did not have any further questions at this time.  I offered a return by NICU providers should questions arise later.    Total time spent at the bedside and preparing for consult was 45 minutes.

## 2023-12-02 NOTE — PROGRESS NOTES
Heriberto Canela  : 1995  MRN: [id number]  CSN: [id number]    Antepartum Progress Note    Subjective   She is feeling no contractions. Normal movement. Questions about management plan and prognosis.     Objective     Min/max vitals past 24 hours:   Temp  Min: 97.7 °F (36.5 °C)  Max: 98.5 °F (36.9 °C)  BP  Min: 82/54  Max: 116/69  Pulse  Min: 75  Max: 95  Resp  Min: 16  Max: 16         General: well developed; well nourished  no acute distress   Heart: Not performed.   Lungs: breathing is unlabored   Abdomen: soft, non-tender; no masses  no umbilical or inguinal hernias are present  no hepato-splenomegaly   FHT's: reassuring   Cervix: was not checked.   Contractions: About 4/hour     Labs:   Lab Results (last 24 hours)       Procedure Component Value Units Date/Time    Preeclampsia Panel [[id number]] Collected: 23    Specimen: Blood Updated: 23    CBC & Differential [[id number]]  (Abnormal) Collected: 23    Specimen: Blood Updated: 23    Narrative:      The following orders were created for panel order CBC & Differential.  Procedure                               Abnormality         Status                     ---------                               -----------         ------                     CBC Auto Differential[[id number]]        Abnormal            Final result                 Please view results for these tests on the individual orders.    CBC Auto Differential [[id number]]  (Abnormal) Collected: 23    Specimen: Blood Updated: 23     WBC 14.42 10*3/mm3      RBC 2.87 10*6/mm3      Hemoglobin 9.8 g/dL      Hematocrit 28.5 %      MCV 99.3 fL      MCH 34.1 pg      MCHC 34.4 g/dL      RDW 13.3 %      RDW-SD 48.3 fl      MPV 10.1 fL      Platelets 195 10*3/mm3      Neutrophil % 82.1 %      Lymphocyte % 9.2 %      Monocyte % 6.7 %      Eosinophil % 0.0 %      Basophil % 0.1 %      Immature Grans % 1.9 %      Neutrophils, Absolute  11.84 10*3/mm3      Lymphocytes, Absolute 1.32 10*3/mm3      Monocytes, Absolute 0.97 10*3/mm3      Eosinophils, Absolute 0.00 10*3/mm3      Basophils, Absolute 0.02 10*3/mm3      Immature Grans, Absolute 0.27 10*3/mm3      nRBC 0.0 /100 WBC     Urine Culture - Urine, Urine, Clean Catch [[id number]]  (Normal) Collected: 11/30/23 1815    Specimen: Urine, Clean Catch Updated: 12/01/23 1125     Urine Culture No growth            Medications:  aspirin, 81 mg, Oral, Daily  dexamethasone, 6 mg, Intramuscular, BID  famotidine, 20 mg, Intravenous, BID  prenatal vitamin, 1 tablet, Oral, Daily         acetaminophen    ondansetron    zolpidem    Labs:  Lab Results (last 24 hours)       Procedure Component Value Units Date/Time    Preeclampsia Panel [[id number]] Collected: 12/02/23 0353    Specimen: Blood Updated: 12/02/23 0607    CBC & Differential [[id number]]  (Abnormal) Collected: 12/02/23 0353    Specimen: Blood Updated: 12/02/23 0601    Narrative:      The following orders were created for panel order CBC & Differential.  Procedure                               Abnormality         Status                     ---------                               -----------         ------                     CBC Auto Differential[[id number]]        Abnormal            Final result                 Please view results for these tests on the individual orders.    CBC Auto Differential [[id number]]  (Abnormal) Collected: 12/02/23 0353    Specimen: Blood Updated: 12/02/23 0601     WBC 14.42 10*3/mm3      RBC 2.87 10*6/mm3      Hemoglobin 9.8 g/dL      Hematocrit 28.5 %      MCV 99.3 fL      MCH 34.1 pg      MCHC 34.4 g/dL      RDW 13.3 %      RDW-SD 48.3 fl      MPV 10.1 fL      Platelets 195 10*3/mm3      Neutrophil % 82.1 %      Lymphocyte % 9.2 %      Monocyte % 6.7 %      Eosinophil % 0.0 %      Basophil % 0.1 %      Immature Grans % 1.9 %      Neutrophils, Absolute 11.84 10*3/mm3      Lymphocytes, Absolute 1.32 10*3/mm3      Monocytes,  Absolute 0.97 10*3/mm3      Eosinophils, Absolute 0.00 10*3/mm3      Basophils, Absolute 0.02 10*3/mm3      Immature Grans, Absolute 0.27 10*3/mm3      nRBC 0.0 /100 WBC     Urine Culture - Urine, Urine, Clean Catch [[id number]]  (Normal) Collected: 23    Specimen: Urine, Clean Catch Updated: 23 1125     Urine Culture No growth          Lab Results   Component Value Date    HGB 9.8 (L) 2023        Assessment   IUP at 25w0d  Mono/Di Twins  Short Cervix receiving course of  steroids/Magnesium     Plan   D/C Magnesium tomorrow AM. I would suggest extended period of in-hospital obs off magnesium and re-check cx perhaps as early as Monday. Will require transfer should delivery seem more imminent       Luan Queen MD  2023  06:33 EST

## 2023-12-03 LAB
ABO GROUP BLD: NORMAL
BLD GP AB SCN SERPL QL: NEGATIVE
DEPRECATED RDW RBC AUTO: 50.4 FL (ref 37–54)
ERYTHROCYTE [DISTWIDTH] IN BLOOD BY AUTOMATED COUNT: 13.7 % (ref 12.3–15.4)
HCT VFR BLD AUTO: 30.3 % (ref 34–46.6)
HGB BLD-MCNC: 10.3 G/DL (ref 12–15.9)
MCH RBC QN AUTO: 34.4 PG (ref 26.6–33)
MCHC RBC AUTO-ENTMCNC: 34 G/DL (ref 31.5–35.7)
MCV RBC AUTO: 101.3 FL (ref 79–97)
PLATELET # BLD AUTO: 201 10*3/MM3 (ref 140–450)
PMV BLD AUTO: 9.8 FL (ref 6–12)
RBC # BLD AUTO: 2.99 10*6/MM3 (ref 3.77–5.28)
RH BLD: POSITIVE
T&S EXPIRATION DATE: NORMAL
WBC NRBC COR # BLD AUTO: 15.58 10*3/MM3 (ref 3.4–10.8)

## 2023-12-03 PROCEDURE — 96376 TX/PRO/DX INJ SAME DRUG ADON: CPT

## 2023-12-03 PROCEDURE — 86901 BLOOD TYPING SEROLOGIC RH(D): CPT | Performed by: OBSTETRICS & GYNECOLOGY

## 2023-12-03 PROCEDURE — 86850 RBC ANTIBODY SCREEN: CPT | Performed by: OBSTETRICS & GYNECOLOGY

## 2023-12-03 PROCEDURE — 86900 BLOOD TYPING SEROLOGIC ABO: CPT | Performed by: OBSTETRICS & GYNECOLOGY

## 2023-12-03 PROCEDURE — 85027 COMPLETE CBC AUTOMATED: CPT | Performed by: OBSTETRICS & GYNECOLOGY

## 2023-12-03 PROCEDURE — G0378 HOSPITAL OBSERVATION PER HR: HCPCS

## 2023-12-03 PROCEDURE — 59025 FETAL NON-STRESS TEST: CPT

## 2023-12-03 RX ORDER — ASPIRIN 81 MG/1
81 TABLET, CHEWABLE ORAL DAILY
Status: DISCONTINUED | OUTPATIENT
Start: 2023-12-03 | End: 2023-12-04 | Stop reason: HOSPADM

## 2023-12-03 RX ADMIN — FAMOTIDINE 20 MG: 10 INJECTION, SOLUTION INTRAVENOUS at 20:16

## 2023-12-03 RX ADMIN — PRENATAL VITAMINS-IRON FUMARATE 27 MG IRON-FOLIC ACID 0.8 MG TABLET 1 TABLET: at 20:16

## 2023-12-03 RX ADMIN — ASPIRIN 81 MG: 81 TABLET, CHEWABLE ORAL at 20:16

## 2023-12-03 NOTE — PROGRESS NOTES
Heriberto Canela  : 1995  MRN: [id number]  CSN: [id number]    Antepartum Progress Note    Subjective   She is feeling no contractions. Normal movement. Questions about management plan and prognosis.     Objective     Min/max vitals past 24 hours:   Temp  Min: 97.8 °F (36.6 °C)  Max: 98.1 °F (36.7 °C)  BP  Min: 92/51  Max: 119/72  Pulse  Min: 71  Max: 97  Resp  Min: 14  Max: 18         General: well developed; well nourished  no acute distress               FHT's: reassuring   Cervix: was not checked.   Contractions: Rare. Uterine irritability noted     Labs:   Lab Results (last 24 hours)       Procedure Component Value Units Date/Time    CBC (No Diff) [[id number]]  (Abnormal) Collected: 23    Specimen: Blood Updated: 23     WBC 15.58 10*3/mm3      RBC 2.99 10*6/mm3      Hemoglobin 10.3 g/dL      Hematocrit 30.3 %      .3 fL      MCH 34.4 pg      MCHC 34.0 g/dL      RDW 13.7 %      RDW-SD 50.4 fl      MPV 9.8 fL      Platelets 201 10*3/mm3     Urine Culture - Urine, Urine, Clean Catch [[id number]] Collected: 23 181    Specimen: Urine, Clean Catch Updated: 23 1204     Urine Culture >100,000 CFU/mL Normal Urogenital Aletha    Narrative:      Colonization of the urinary tract without infection is common. Treatment is discouraged unless the patient is symptomatic, pregnant, or undergoing an invasive urologic procedure.            Medications:  aspirin, 81 mg, Oral, Daily  docusate sodium, 100 mg, Oral, BID  famotidine, 20 mg, Intravenous, BID  prenatal vitamin, 1 tablet, Oral, Nightly         acetaminophen    magnesium hydroxide    ondansetron    witch hazel-glycerin    zolpidem    Labs:  Lab Results (last 24 hours)       Procedure Component Value Units Date/Time    CBC (No Diff) [[id number]]  (Abnormal) Collected: 23    Specimen: Blood Updated: 23     WBC 15.58 10*3/mm3      RBC 2.99 10*6/mm3      Hemoglobin 10.3 g/dL      Hematocrit  30.3 %      .3 fL      MCH 34.4 pg      MCHC 34.0 g/dL      RDW 13.7 %      RDW-SD 50.4 fl      MPV 9.8 fL      Platelets 201 10*3/mm3     Urine Culture - Urine, Urine, Clean Catch [[id number]] Collected: 23    Specimen: Urine, Clean Catch Updated: 23 1204     Urine Culture >100,000 CFU/mL Normal Urogenital Aletha    Narrative:      Colonization of the urinary tract without infection is common. Treatment is discouraged unless the patient is symptomatic, pregnant, or undergoing an invasive urologic procedure.          Lab Results   Component Value Date    HGB 10.3 (L) 2023        Assessment   IUP at 25w1d  Mono/Di Twins  Short Cervix receiving course of  steroids/Magnesium     Plan   Off Magnesium. I would suggest a > 24 hour period of in-hospital obs off magnesium and re-check cx Monday. Will require transfer should delivery seem more imminent       Luan Queen MD  12/3/2023  12:03 EST

## 2023-12-04 ENCOUNTER — APPOINTMENT (OUTPATIENT)
Dept: WOMENS IMAGING | Facility: HOSPITAL | Age: 28
End: 2023-12-04
Payer: COMMERCIAL

## 2023-12-04 VITALS
SYSTOLIC BLOOD PRESSURE: 114 MMHG | TEMPERATURE: 98.5 F | HEIGHT: 61 IN | HEART RATE: 93 BPM | RESPIRATION RATE: 16 BRPM | WEIGHT: 155 LBS | OXYGEN SATURATION: 98 % | BODY MASS INDEX: 29.27 KG/M2 | DIASTOLIC BLOOD PRESSURE: 70 MMHG

## 2023-12-04 PROCEDURE — 76815 OB US LIMITED FETUS(S): CPT

## 2023-12-04 PROCEDURE — 76817 TRANSVAGINAL US OBSTETRIC: CPT | Performed by: OBSTETRICS & GYNECOLOGY

## 2023-12-04 PROCEDURE — 76815 OB US LIMITED FETUS(S): CPT | Performed by: OBSTETRICS & GYNECOLOGY

## 2023-12-04 PROCEDURE — 59025 FETAL NON-STRESS TEST: CPT

## 2023-12-04 PROCEDURE — G0378 HOSPITAL OBSERVATION PER HR: HCPCS

## 2023-12-04 PROCEDURE — 76817 TRANSVAGINAL US OBSTETRIC: CPT

## 2023-12-04 PROCEDURE — 96376 TX/PRO/DX INJ SAME DRUG ADON: CPT

## 2023-12-04 RX ADMIN — DOCUSATE SODIUM 100 MG: 100 CAPSULE, LIQUID FILLED ORAL at 08:56

## 2023-12-04 RX ADMIN — FAMOTIDINE 20 MG: 10 INJECTION, SOLUTION INTRAVENOUS at 08:56

## 2023-12-04 NOTE — DISCHARGE SUMMARY
Admission date: 2023  Discharge date: 23    Referring Provider: Selina Curtis MD    Admission diagnosis:  Cervical shortening affecting pregnancy [O26.879]      Cervical shortening affecting pregnancy       Discharge diagnosis: Mono / Di twin pregnancy     Cervical shortening with funnel      contractions      Consultants:  PDC      Hospital course: 29 yo G1 with mono/di twins and cervical shortening noted on u/s.  She was admitted, noted to be javi every 3-5 minutes and started on magnesium and received steroids for FLM.  She underwent PDC scan  and  showing stability in cervical shortening and funneling.  She will be discharged home with Procardia  10 mg prn contractions and vaginal progesterone knowing data inconslusive but very low risk intervention.  She will follow up with me on Thursday in office with u/s.  DEEJAY prec given.         Vitals:    23 0839   BP: 114/70   Pulse: 93   Resp: 16   Temp: 98.5 °F (36.9 °C)   SpO2:        GENERAL:  Well-developed, well-nourished in no acute distress.   ABD:  Gravid  NST:  FHT's  EXTREMITIES:  No clubbing, cyanosis or edema.  PSYCHIATRIC:  Normal affect and mood.      NST NOTE    Indiction :   Twin A  FHR:          Cat 1, No decels  Contractions:    Irregular  Time Monitored:   > 20 minutes     NST NOTE    Indiction :   Twin B  FHR:          Cat 1, No decels  Contractions:    Irregular  Time Monitored:   > 20 minutes     Discharge condition: stable  Discharge diet   Dietary Orders (From admission, onward)       Start     Ordered    23  Diet: Regular/House Diet; Texture: Regular Texture (IDDSI 7); Fluid Consistency: Thin (IDDSI 0)  Diet Effective Now        References:    Diet Order Crosswalk   Question Answer Comment   Diets: Regular/House Diet    Texture: Regular Texture (IDDSI 7)    Fluid Consistency: Thin (IDDSI 0)        23 175                  Additional Instructions: Call with fevers, uncontrolled  nausea/vomiting/pain.  Medications:      Discharge Medications        Continue These Medications        Instructions Start Date   acetaminophen 325 MG tablet  Commonly known as: TYLENOL   Oral      albuterol sulfate  (90 Base) MCG/ACT inhaler  Commonly known as: PROVENTIL HFA;VENTOLIN HFA;PROAIR HFA       aluminum-magnesium hydroxide-simethicone 200-200-20 MG/5ML suspension  Commonly known as: MAALOX/MYLANTA   5 mL, Oral, Every 6 Hours PRN      aspirin 81 MG EC tablet   81 mg, Oral, Daily      calcium carbonate 500 MG chewable tablet  Commonly known as: TUMS   1 tablet, Oral, As Needed      famotidine 20 MG tablet  Commonly known as: Pepcid   20 mg, Oral, 2 Times Daily      prenatal (CLASSIC) vitamin 28-0.8 MG tablet tablet  Generic drug: prenatal vitamin   Oral, Daily             Disposition:Home or Self Care  Follow up:   Future Appointments   Date Time Provider Department Center   12/5/2023  1:00 PM [facility] [address] [facility]   12/5/2023  1:30 PM Selina Curtis MD [facility]   12/7/2023  1:00 PM [facility] [address] [facility]   12/7/2023  1:10 PM Selina Curtis MD [facility]   12/20/2023  9:45 AM [facility]   12/20/2023  9:45 AM [facility] FOLLOW UP [facility] None   1/3/2024  8:30 AM [facility] [address] [facility]   1/3/2024  8:40 AM Selina Curtis MD [facility]  [facility]       Over 30 minutes on discharge.  Counseling and coordinating care.    Selina Curtis MD

## 2023-12-04 NOTE — PROGRESS NOTES
Patient observed over the weekend. Patient currently not on magnesium and patient without symptoms of  birth. Transvaginal ultrasound with stable cervical length today. Patient highly desires discharge today if possible. Given overall stability from ultrasound and symptoms perspective, I believe it is reasonable to discharge home today with close interval follow up. Discussed careful return precautions for  labor.     Jose Wagoner MD, FACOG  Maternal Fetal Medicine, [facility] [place]

## 2023-12-07 ENCOUNTER — LAB (OUTPATIENT)
Dept: LAB | Facility: HOSPITAL | Age: 28
End: 2023-12-07
Payer: OTHER GOVERNMENT

## 2023-12-07 ENCOUNTER — ROUTINE PRENATAL (OUTPATIENT)
Dept: OBSTETRICS AND GYNECOLOGY | Facility: CLINIC | Age: 28
End: 2023-12-07
Payer: OTHER GOVERNMENT

## 2023-12-07 VITALS — BODY MASS INDEX: 29.02 KG/M2 | DIASTOLIC BLOOD PRESSURE: 78 MMHG | SYSTOLIC BLOOD PRESSURE: 128 MMHG | WEIGHT: 153.6 LBS

## 2023-12-07 DIAGNOSIS — O34.32 CERVICAL FUNNELING AFFECTING PREGNANCY IN SECOND TRIMESTER: ICD-10-CM

## 2023-12-07 DIAGNOSIS — O26.872 CERVICAL SHORTENING AFFECTING PREGNANCY IN SECOND TRIMESTER: ICD-10-CM

## 2023-12-07 DIAGNOSIS — O30.031 MONOCHORIONIC DIAMNIOTIC TWIN GESTATION IN FIRST TRIMESTER: ICD-10-CM

## 2023-12-07 DIAGNOSIS — O30.031 MONOCHORIONIC DIAMNIOTIC TWIN GESTATION IN FIRST TRIMESTER: Primary | ICD-10-CM

## 2023-12-07 DIAGNOSIS — Z34.92 PRENATAL CARE IN SECOND TRIMESTER: ICD-10-CM

## 2023-12-07 LAB
BLOODY SPECIMEN?: NO
FIBRONECTIN FETAL VAG QL: NEGATIVE
GLUCOSE UR STRIP-MCNC: NEGATIVE MG/DL
PROT UR STRIP-MCNC: NEGATIVE MG/DL

## 2023-12-07 PROCEDURE — 82731 ASSAY OF FETAL FIBRONECTIN: CPT

## 2023-12-07 NOTE — PROGRESS NOTES
OB FOLLOW UP  CC- Here for care of pregnancy        Shweta Canela is a 28 y.o.  25w5d patient being seen today for her obstetrical follow up visit. Patient reports BH contractions, low back pain (She denies dysuria), and hemorrhoids. Patient states that she did not really feel most of her contractions when she was admitted to the APU. Patient states that she could intermittently feel tightening. Patient states that will notice tightening when she is standing or walking around the house. Patient states that she has been mainly laying down at home. Patient states that she has had some mild low back pain when she has been up walking around the house.      Patient admitted to the APU from - for Mono/Di twin pregnancy, PTL, and Cervical shortening & funneling. Patient was found to be javi every 3-5 minutes. Patient was started on magnesium and given steroids for FLM. Patient underwent PDC scan on  and  and showed stability in cervical shortening and funneling. Patient was to be discharged home with Procardia 10MG PRN and vaginal Progesterone. Patient instructed to f/u in office today for FFN swab with US.     Her prenatal care is complicated by (and status) :   Patient Active Problem List   Diagnosis    Monochorionic diamniotic twin gestation in first trimester    Migraines    Anxiety    Asthma    Chronic nonintractable headache    Eye pain, left    Contraceptive management    Encounter for Papanicolaou smear of cervix    Screening for STD (sexually transmitted disease)    Depression, controlled    Moderate persistent asthma without complication    Mood swings    Motion sickness    Murmur    Non-recurrent acute suppurative otitis media of left ear without spontaneous rupture of tympanic membrane    Oral aphthous ulcer    Ulcer mouth    Serous otitis media with rupture of tympanic membrane    Migraine, intractable    Cervical shortening affecting pregnancy       Flu Status:  Already given in current flu season  Ultrasound Today: Yes    ROS -   Patient Reports :  see above  Patient Denies: Loss of Fluid, Vaginal Spotting, Vision Changes, Headaches, Nausea , Vomiting , and Epigastric pain  Fetal Movement : normal  All other systems reviewed and are negative.       The additional following portions of the patient's history were reviewed and updated as appropriate: allergies and current medications.      I have reviewed and agree with the HPI, ROS, and historical information as entered above. Selina Curtis MD      /78   Wt 69.7 kg (153 lb 9.6 oz)   BMI 29.02 kg/m²       EXAM:     Prenatal Vitals  BP: 128/78  Weight: 69.7 kg (153 lb 9.6 oz)       Dilation/Effacement/Station  Dilation: Closed           Urine Glucose Read-only: Negative  Urine Protein Read-only: Negative       Assessment and Plan    Problem List Items Addressed This Visit          Gravid and     Monochorionic diamniotic twin gestation in first trimester - Primary    Overview     PDC ultrasound every 4 weeks  23  US with Kirsten every 4 weeks (2 weeks in between pdc)  23  Start NST 2x weekly @ 32 weeks         Relevant Orders    Fetal Fibronectin - Vaginal Fluid, Cervix (Completed)     Other Visit Diagnoses       Cervical funneling affecting pregnancy in second trimester        Relevant Orders    Fetal Fibronectin - Vaginal Fluid, Cervix (Completed)    Cervical shortening affecting pregnancy in second trimester        Relevant Orders    Fetal Fibronectin - Vaginal Fluid, Cervix (Completed)    Prenatal care in second trimester        Relevant Orders    Fetal Fibronectin - Vaginal Fluid, Cervix (Completed)    POC Urinalysis Dipstick (Completed)            Pregnancy at 25w5d  Fetal status reassuring.  U/S reviewed.  Cerv length 1.3 with 2.1 cm funnel.  1 hour gtt, CBC, Antibody screen, and TDAP next visit. Instructions given  COntinue reduced activity.  FFN today  Activity and Exercise  discussed.  Return in about 13 days (around 12/20/2023) for One hour glucola.      Selina Curtis MD  12/07/2023

## 2023-12-08 ENCOUNTER — TELEPHONE (OUTPATIENT)
Dept: OBSTETRICS AND GYNECOLOGY | Facility: CLINIC | Age: 28
End: 2023-12-08
Payer: OTHER GOVERNMENT

## 2023-12-08 ENCOUNTER — HOSPITAL ENCOUNTER (OUTPATIENT)
Facility: HOSPITAL | Age: 28
Discharge: HOME | End: 2023-12-08
Attending: OBSTETRICS & GYNECOLOGY | Admitting: OBSTETRICS & GYNECOLOGY
Payer: COMMERCIAL

## 2023-12-08 VITALS
OXYGEN SATURATION: 97 % | DIASTOLIC BLOOD PRESSURE: 74 MMHG | SYSTOLIC BLOOD PRESSURE: 108 MMHG | RESPIRATION RATE: 16 BRPM | TEMPERATURE: 97.9 F | HEART RATE: 104 BPM

## 2023-12-08 LAB
BILIRUB UR QL STRIP: NEGATIVE
CLARITY UR: CLEAR
COLOR UR: YELLOW
GLUCOSE UR STRIP-MCNC: NEGATIVE MG/DL
HGB UR QL STRIP.AUTO: NEGATIVE
KETONES UR QL STRIP: NEGATIVE
LEUKOCYTE ESTERASE UR QL STRIP.AUTO: NEGATIVE
NITRITE UR QL STRIP: NEGATIVE
PH UR STRIP.AUTO: 7 [PH] (ref 5–8)
PROT UR QL STRIP: NEGATIVE
SP GR UR STRIP: 1.01 (ref 1–1.03)
UROBILINOGEN UR QL STRIP: NORMAL

## 2023-12-08 PROCEDURE — 59025 FETAL NON-STRESS TEST: CPT

## 2023-12-08 PROCEDURE — G0463 HOSPITAL OUTPT CLINIC VISIT: HCPCS

## 2023-12-08 PROCEDURE — 81003 URINALYSIS AUTO W/O SCOPE: CPT | Performed by: OBSTETRICS & GYNECOLOGY

## 2023-12-08 PROCEDURE — 99213 OFFICE O/P EST LOW 20 MIN: CPT | Performed by: OBSTETRICS & GYNECOLOGY

## 2023-12-08 RX ORDER — NIFEDIPINE 10 MG/1
10 CAPSULE ORAL
Qty: 30 CAPSULE | Refills: 1 | Status: SHIPPED | OUTPATIENT
Start: 2023-12-08

## 2023-12-08 NOTE — TELEPHONE ENCOUNTER
[facility] OB patient  25w6d    Patient states that she thinks she lost her mucous plug. Patient states that she noticed sticky, thick, clear, and yellow vaginal discharge with wiping this AM. Patient states that she has been on bed rest and taking it easy at home. Patient denies PTL s/s. Patient states that the medications were not sent to her pharmacy. Medications sent. Patient is asking if IC is safe. Informed patient that she is on pelvic rest: nothing into the vagina. Patient v/u. Patient reports +FM. Patient denies vaginal bleeding and LOF. Patient instructed to report to L&D with s/s of PTL, vaginal bleeding, LOF, and decreased FM. She v/u.

## 2023-12-08 NOTE — TELEPHONE ENCOUNTER
Pt calling to request her prescriptions discussed at yesterdays visit be sent to her pharmacy - [facility]

## 2023-12-08 NOTE — TELEPHONE ENCOUNTER
Also wondering how to know if she lost her mucus plug and what to expect and with everything she has going on is intercourse safe

## 2023-12-09 NOTE — H&P
"[facility]  Obstetric History and Physical    CC:    Contractions and mucous discharge    HPI:      Patient is a 28 y.o. female  currently at 25w6d, who presents with a complaint of some contractions  and a episode of mucous discharge that she has not had before. She is complicated in that she was recently admitted due to  labour and twins gestation.    She received Magnesium and steroids for lung maturity.  She has a known shortened cervix at 1.3 cm.   She is currently on bedrest and will start taking Procardia prn for contractions from Dr. Curtis and Progesterone.   She had an episode of mucous tonight, but no other discharge including leaking and bleeding.   Her contractions are mild and she does not feel most of them.   +FM x2.          The following portions of the patients history were reviewed and updated as appropriate: current medications, allergies, past medical history, past surgical history, past family history, past social history and problem list .       Prenatal Information:   Maternal Prenatal Labs  Blood Type No results found for: \"ABO\"   Rh Status No results found for: \"RH\"   Antibody Screen No results found for: \"ABSCRN\"   Gonnorhea No results found for: \"GCCX\"   Chlamydia No results found for: \"CLAMYDCU\"   RPR No results found for: \"RPR\"   Syphilis Antibody No results found for: \"SYPHILIS\"   Rubella No results found for: \"RUBELLAIGGIN\"   Hepatitis B Surface Antigen No results found for: \"HEPBSAG\"   HIV-1 Antibody No results found for: \"LABHIV1\"   Hepatitis C Antibody No results found for: \"HEPCAB\"   Rapid Urin Drug Screen No results found for: \"AMPMETHU\", \"BARBITSCNUR\", \"LABBENZSCN\", \"LABMETHSCN\", \"LABOPIASCN\", \"THCURSCR\", \"COCAINEUR\", \"AMPHETSCREEN\", \"PROPOXSCN\", \"BUPRENORSCNU\", \"METAMPSCNUR\", \"OXYCODONESCN\", \"TRICYCLICSCN\"   Group B Strep Culture No results found for: \"GBSANTIGEN\"           External Prenatal Results       Pregnancy Outside Results - Transcribed From Office " Records - See Scanned Records For Details       Test Value Date Time    ABO  A  23 0450    Rh  Positive  23 0450    Antibody Screen  Negative  23 0450       Negative  23 2011       Negative  23 1133    Varicella IgG       Rubella  6.59 index 23 1133    Hgb  10.3 g/dL 23 0449       9.8 g/dL 23 0353       10.6 g/dL 23 1850       11.5 g/dL 23        13.0 g/dL 23 1133    Hct  30.3 % 23 0449       28.5 % 23 0353       30.2 % 23 1850       32.8 % 23        37.3 % 23 1133    Glucose Fasting GTT       Glucose Tolerance Test 1 hour       Glucose Tolerance Test 3 hour       Gonorrhea (discrete)  Negative  23 1133    Chlamydia (discrete)  Negative  23 1133    RPR  Non Reactive  23 1133    VDRL       Syphilis Antibody       HBsAg  Negative  23 1133    Herpes Simplex Virus PCR       Herpes Simplex VIrus Culture       HIV  Non Reactive  23 1133    Hep C RNA Quant PCR       Hep C Antibody  Non Reactive  23 1133    AFP       Group B Strep       GBS Susceptibility to Clindamycin       GBS Susceptibility to Erythromycin       Fetal Fibronectin  Negative  23 1354    Genetic Testing, Maternal Blood                 Drug Screening       Test Value Date Time    Urine Drug Screen       Amphetamine Screen  Negative ng/mL 23 1133    Barbiturate Screen  Negative ng/mL 23 1133    Benzodiazepine Screen  Negative ng/mL 23 1133    Methadone Screen  Negative ng/mL 23 1133    Phencyclidine Screen  Negative ng/mL 23 1133    Opiates Screen       THC Screen       Cocaine Screen       Propoxyphene Screen  Negative ng/mL 23 1133    Buprenorphine Screen       Methamphetamine Screen       Oxycodone Screen       Tricyclic Antidepressants Screen                 Legend    ^: Historical                              Past OB History:     OB History    Para Term  AB Living   1  0 0 0 0 0   SAB IAB Ectopic Molar Multiple Live Births   0 0 0 0 0 0      # Outcome Date GA Lbr Khadar/2nd Weight Sex Delivery Anes PTL Lv   1 Current               Obstetric Comments   FOB #1 Pregnancy #1  current       Past Medical History: Past Medical History:   Diagnosis Date    Chronic paroxysmal hemicrania, not intractable     Concussion     IBS (irritable bowel syndrome)     Migraines       Past Surgical History Past Surgical History:   Procedure Laterality Date    EAR TUBES      WISDOM TOOTH EXTRACTION  2013      Family History: Family History   Problem Relation Age of Onset    No Known Problems Mother     No Known Problems Father     Migraines Maternal Aunt     Heart attack Maternal Grandfather     Coronary artery disease Maternal Grandfather     Cancer Other         lung    No Known Problems Sister     No Known Problems Maternal Grandmother       Social History:  reports that she has never smoked. She has never used smokeless tobacco.   reports that she does not currently use alcohol after a past usage of about 4.0 standard drinks of alcohol per week.   reports no history of drug use.        Review of Systems  Denies fever, HA, CP, Shortness of air, muscle weakness,                                             and rashes      Objective     Vital Signs Range for the last 24 hours  Temperature: Temp:  [97.9 °F (36.6 °C)] 97.9 °F (36.6 °C)   Temp Source: Temp src: Oral   BP: BP: (108)/(74) 108/74   Pulse: Heart Rate:  [] 104   Respirations: Resp:  [16] 16   SPO2: SpO2:  [96 %-97 %] 97 %   O2 Amount (l/min):     O2 Devices     Weight:       Physical Examination: General appearance - alert, well appearing, and in no distress and oriented to person, place, and time  Chest - clear to auscultation, no wheezes, rales or rhonchi, symmetric air entry  Heart - S1 and S2 normal, no murmurs noted  Abdomen - soft, nontender, nondistended, no masses or organomegaly  no rebound tenderness noted  bowel sounds normal  No  guarding, No RUQ pain  Extremities - pedal edema  - none         Cervix: Exam by: Method: sterile exam per physician   Dilation:  Closed   Effacement:  80   Station:  -1     Fetal Heart Rate Assessment   Method: Fetal HR Assessment Method: external   Beats/min: Fetal HR (beats/min): 135   Baseline: Fetal HR Baseline: indeterminate   Varibility: Fetal HR Variability: moderate (amplitude range 6 to 25 bpm)   Accels: Fetal HR Accelerations: greater than/equal to 10 bpm (32 wks gest or less), lasts at least 10 seconds (32 wks gest or less)   Decels: Fetal HR Decelerations: absent   Tracing Category:       Uterine Assessment   Method: Method: external tocotransducer   Frequency (min): Contraction Frequency (Minutes): 7   Ctx Count in 10 min:     Duration:     Intensity: Contraction Intensity: mild by palpation   Intensity by IUPC:     Resting Tone: Uterine Resting Tone: soft by palpation   Resting Tone by IUPC:           Laboratory Results:   Lab Results   Component Value Date    SPECGRAVUR 1.011 2023    KETONESU Negative 2023    BLOODU Negative 2023    LEUKOCYTESUR Negative 2023    NITRITEU Negative 2023    RBCUA None seen 2023    BACTERIA Moderate (A) 2023          Assessment/Plan  1. Intrauterine pregnancy at 25w6d weeks gestation with reassuring fetal status  2.  contractions with no evidence of current DEEJAY  3. Mucous discharge with no evidence of PPROM   4. High risk pregnancy with Twins and significant cervical shortening - currently stable on home bedrest.   She is to start vaginal progesterone and Procardia prn contractions per Dr. Curtis.   I spent 20 minutes discussing her concerns and answering questions.    5. D/C home with continued precautions      Jeff Benitez MD  2023  21:52 EST

## 2023-12-11 ENCOUNTER — ROUTINE PRENATAL (OUTPATIENT)
Dept: OBSTETRICS AND GYNECOLOGY | Facility: CLINIC | Age: 28
End: 2023-12-11
Payer: OTHER GOVERNMENT

## 2023-12-11 VITALS — SYSTOLIC BLOOD PRESSURE: 104 MMHG | WEIGHT: 155 LBS | DIASTOLIC BLOOD PRESSURE: 68 MMHG | BODY MASS INDEX: 29.29 KG/M2

## 2023-12-11 DIAGNOSIS — O26.879 CERVICAL SHORTENING AFFECTING PREGNANCY: ICD-10-CM

## 2023-12-11 DIAGNOSIS — O30.031 MONOCHORIONIC DIAMNIOTIC TWIN GESTATION IN FIRST TRIMESTER: ICD-10-CM

## 2023-12-11 DIAGNOSIS — O30.031 MONOCHORIONIC DIAMNIOTIC TWIN GESTATION IN FIRST TRIMESTER: Primary | ICD-10-CM

## 2023-12-11 DIAGNOSIS — O34.32 CERVICAL FUNNELING AFFECTING PREGNANCY IN SECOND TRIMESTER: ICD-10-CM

## 2023-12-11 DIAGNOSIS — Z34.92 PRENATAL CARE IN SECOND TRIMESTER: Primary | ICD-10-CM

## 2023-12-11 LAB
GLUCOSE UR STRIP-MCNC: NEGATIVE MG/DL
PROT UR STRIP-MCNC: NEGATIVE MG/DL

## 2023-12-11 PROCEDURE — 0502F SUBSEQUENT PRENATAL CARE: CPT | Performed by: NURSE PRACTITIONER

## 2023-12-11 NOTE — TELEPHONE ENCOUNTER
S/w pt she states her back pain and stomach tightness has resolved since going to the ED but they did advise her to f/u with our office this week.     Per Radha: since patients cervix is thin and she is pregnant with twins she advises pt. To come in today for further evaluation with NP and U/S.     Pt. Has been scheduled.

## 2023-12-11 NOTE — TELEPHONE ENCOUNTER
Pt reports being seen in [facility] ER on Friday night, pt was having sharp back pain and stomach tightness   Pt stated they did not do an u/s and ER doc told her to f/u with out office early this week

## 2023-12-11 NOTE — PROGRESS NOTES
OB FOLLOW UP  CC- Here for care of pregnancy        Shweta Canela is a 28 y.o.  26w2d patient being seen today for her obstetrical follow up visit from a visit to the ER 3 days ago for back pain and stomach tightening. Patient reports irregular, BH contractions . Patient is still on bedrest and pelvic rest.    Her prenatal care is complicated by (and status) :   Patient Active Problem List   Diagnosis    Monochorionic diamniotic twin gestation in first trimester    Migraines    Anxiety    Asthma    Chronic nonintractable headache    Eye pain, left    Contraceptive management    Encounter for Papanicolaou smear of cervix    Screening for STD (sexually transmitted disease)    Depression, controlled    Moderate persistent asthma without complication    Mood swings    Motion sickness    Murmur    Non-recurrent acute suppurative otitis media of left ear without spontaneous rupture of tympanic membrane    Oral aphthous ulcer    Ulcer mouth    Serous otitis media with rupture of tympanic membrane    Migraine, intractable    Cervical shortening affecting pregnancy       Flu Status: Already given in current flu season  Ultrasound Today: Yes cervical length: 18.6 mm. Fetus A: , normal AF. Fetus B: , normal AF    ROS -   Patient Reports : see above  Patient Denies: Loss of Fluid, Vaginal Spotting, Vision Changes, Headaches, Nausea , Vomiting , and Epigastric pain  Fetal Movement : normal  All other systems reviewed and are negative.       The additional following portions of the patient's history were reviewed and updated as appropriate: allergies and current medications.      I have reviewed and agree with the HPI, ROS, and historical information as entered above. Nesha Real, APRN      /68   Wt 70.3 kg (155 lb)   BMI 29.29 kg/m²       EXAM:     Prenatal Vitals  BP: 104/68  Weight: 70.3 kg (155 lb)   Fetal Heart Rate: US               Urine Glucose Read-only: Negative  Urine  Protein Read-only: Negative       Assessment and Plan    Problem List Items Addressed This Visit       Monochorionic diamniotic twin gestation in first trimester    Overview     PDC ultrasound every 4 weeks  11/21/23 12/20/23  US with Curtis every 4 weeks (2 weeks in between pdc)  12/5/23  Start NST 2x weekly @ 32 weeks         Cervical shortening affecting pregnancy    Current Assessment & Plan     12/11/23: CL 18.6 mm          Other Visit Diagnoses       Prenatal care in second trimester    -  Primary    Relevant Orders    POC Urinalysis Dipstick (Completed)            Pregnancy at 26w2d  Fetal status reassuring x 2. Cervical length: 18.6 mm. Fetus A: , normal AF. Fetus B: , normal AF  1 hour gtt, CBC, Antibody screen, and TDAP next visit. Instructions given  Fetal movement/PTL or Labor precautions  Reviewed routine prenatal care with the office and educational materials given  Discussed/encouraged TDAP vaccination after 28 weeks  Activity and Exercise discussed.  Return in about 9 days (around 12/20/2023). Will do glucola testing at that visit. PDC scan on the same day.      Nesha Real, APRN  12/11/2023

## 2023-12-20 ENCOUNTER — OFFICE VISIT (OUTPATIENT)
Dept: OBSTETRICS AND GYNECOLOGY | Facility: HOSPITAL | Age: 28
End: 2023-12-20
Payer: OTHER GOVERNMENT

## 2023-12-20 ENCOUNTER — HOSPITAL ENCOUNTER (OUTPATIENT)
Dept: WOMENS IMAGING | Facility: HOSPITAL | Age: 28
Discharge: HOME | End: 2023-12-20
Admitting: OBSTETRICS & GYNECOLOGY
Payer: OTHER GOVERNMENT

## 2023-12-20 ENCOUNTER — ROUTINE PRENATAL (OUTPATIENT)
Dept: OBSTETRICS AND GYNECOLOGY | Facility: CLINIC | Age: 28
End: 2023-12-20
Payer: OTHER GOVERNMENT

## 2023-12-20 VITALS — DIASTOLIC BLOOD PRESSURE: 70 MMHG | WEIGHT: 158.4 LBS | BODY MASS INDEX: 29.93 KG/M2 | SYSTOLIC BLOOD PRESSURE: 112 MMHG

## 2023-12-20 VITALS — WEIGHT: 159 LBS | SYSTOLIC BLOOD PRESSURE: 128 MMHG | DIASTOLIC BLOOD PRESSURE: 74 MMHG | BODY MASS INDEX: 30.04 KG/M2

## 2023-12-20 DIAGNOSIS — O26.879 CERVICAL SHORTENING AFFECTING PREGNANCY: ICD-10-CM

## 2023-12-20 DIAGNOSIS — O30.031 MONOCHORIONIC DIAMNIOTIC TWIN GESTATION IN FIRST TRIMESTER: ICD-10-CM

## 2023-12-20 DIAGNOSIS — Z34.92 PRENATAL CARE IN SECOND TRIMESTER: Primary | ICD-10-CM

## 2023-12-20 DIAGNOSIS — O30.033 MONOCHORIONIC DIAMNIOTIC TWIN GESTATION IN THIRD TRIMESTER: ICD-10-CM

## 2023-12-20 DIAGNOSIS — O30.031 MONOCHORIONIC DIAMNIOTIC TWIN GESTATION IN FIRST TRIMESTER: Primary | ICD-10-CM

## 2023-12-20 LAB
ERYTHROCYTE [DISTWIDTH] IN BLOOD BY AUTOMATED COUNT: 12.3 % (ref 12.3–15.4)
GLUCOSE UR STRIP-MCNC: NEGATIVE MG/DL
HCT VFR BLD AUTO: 33.1 % (ref 34–46.6)
HGB BLD-MCNC: 11.5 G/DL (ref 12–15.9)
MCH RBC QN AUTO: 34.5 PG (ref 26.6–33)
MCHC RBC AUTO-ENTMCNC: 34.7 G/DL (ref 31.5–35.7)
MCV RBC AUTO: 99.4 FL (ref 79–97)
PLATELET # BLD AUTO: 167 10*3/MM3 (ref 140–450)
PROT UR STRIP-MCNC: NEGATIVE MG/DL
RBC # BLD AUTO: 3.33 10*6/MM3 (ref 3.77–5.28)
WBC # BLD AUTO: 11.68 10*3/MM3 (ref 3.4–10.8)

## 2023-12-20 PROCEDURE — 76816 OB US FOLLOW-UP PER FETUS: CPT

## 2023-12-20 PROCEDURE — 76820 UMBILICAL ARTERY ECHO: CPT

## 2023-12-20 NOTE — ASSESSMENT & PLAN NOTE
Denies LOF, VB, CTX.   Has not started vaginal progesterone. Would hold on the rx at this point as has been > 2 weeks since discharge and patient almost 28 weeks GA

## 2023-12-20 NOTE — PROGRESS NOTES
Patient denies bleeding, leaking fluid but does have occasional contractions.  Patient states was hospitalized for contractions approximately 2-3 weeks ago and is still on bedrest.  Patient was given to procardia to take as needed for contractions but states hasn't needed it.  NITPT negative  Patients next follow up with Dr. Curtis if today.

## 2023-12-20 NOTE — ASSESSMENT & PLAN NOTE
Appropriate interval growth with no evidence of TTTS   Follow up with MFM in 4 weeks (scan in 2 weeks with Dr Curtis)

## 2023-12-20 NOTE — PROGRESS NOTES
OB FOLLOW UP  CC- Here for care of pregnancy        Shweta Canela is a 28 y.o.  27w4d patient being seen today for her obstetrical follow up. Patient reports RUQ pain & right upper back when sitting up. Patient states it is better when she lays flat. Patient reports itching in the feet that is worse at night. Patient also reports some occasional BH ctx. She states that have improved since last visit.     Patient underwent Glucola testing today. She had her testing at 9:22 AM.       MBT: A+  Rhogam:  not indicated  28 week packet: reviewed with patient , counseled on fetal movement , pediatrician list reviewed, breast pump discussed, and childbirth classes reviewed  TDAP: Desires at next visit.  Flu Status: Already given in current flu season  Ultrasound Today: Yes, at PDC    Her prenatal care is complicated by (and status) :    Patient Active Problem List   Diagnosis    Monochorionic diamniotic twin gestation in first trimester    Migraines    Anxiety    Asthma    Chronic nonintractable headache    Eye pain, left    Contraceptive management    Encounter for Papanicolaou smear of cervix    Screening for STD (sexually transmitted disease)    Depression, controlled    Moderate persistent asthma without complication    Mood swings    Motion sickness    Murmur    Non-recurrent acute suppurative otitis media of left ear without spontaneous rupture of tympanic membrane    Oral aphthous ulcer    Ulcer mouth    Serous otitis media with rupture of tympanic membrane    Migraine, intractable    Cervical shortening affecting pregnancy         ROS -   Patient Reports :  see above  Patient Denies: Loss of Fluid, Vaginal Spotting, Vision Changes, Headaches, Nausea , Vomiting , and Contractions  Fetal Movement : normal    The additional following portions of the patient's history were reviewed and updated as appropriate: allergies and current medications.    I have reviewed and agree with the HPI, ROS, and  historical information as entered above. Selina Curtis MD      /70   Wt 71.8 kg (158 lb 6.4 oz)   BMI 29.93 kg/m²         EXAM:     Prenatal Vitals  BP: 112/70  Weight: 71.8 kg (158 lb 6.4 oz)   Fetal Heart Rate: pos/pos         Closed      Urine Glucose Read-only: Negative  Urine Protein Read-only: Negative         Assessment and Plan    Problem List Items Addressed This Visit          Gravid and     Monochorionic diamniotic twin gestation in first trimester    Overview     PDC ultrasound every 4 weeks  23  US with Curtis every 4 weeks (2 weeks in between pdc)  23  Start NST 2x weekly @ 32 weeks         Cervical shortening affecting pregnancy    Overview     Restricted activity, no work as if 24 weeks          Other Visit Diagnoses       Prenatal care in second trimester    -  Primary    Relevant Orders    CBC (No Diff)    Gestational Screen 1 Hr ([facility])    Antibody Screen    POC Urinalysis Dipstick (Completed)    Monochorionic diamniotic twin gestation in third trimester        Relevant Orders    US Ob Limited 1 + Fetuses            Pregnancy at 27w4d  1 hr Glucola, CBC, and antibody screen today   Fetal movement/PTL or Labor precautions  PDC scan reviewed.  Continue off work and restricted activity  If Pain and itching continue will check labs.  Activity and Exercise discussed.  Return in about 2 weeks (around 1/3/2024), or with me., for US with Next Visit.        Selina Curtis MD  2023

## 2023-12-20 NOTE — PROGRESS NOTES
"    Maternal/Fetal Medicine Follow Up Note     Name: Shweta Canela    : 1995     MRN: [id number]     Referring Provider: CORRINE Solomon    Chief Complaint  Di/Mo Twins    Subjective     History of Present Illness:  Shweta Canela is a 28 y.o.  27w4d who presents today for follow up   Had an admission for short cervix, possible  labor   Was started on vaginal progesterone but was not able to have it filled   No LOF, VB, CTX     HUA: Estimated Date of Delivery: 3/16/24     ROS:   As noted in HPI.     Objective     Vital Signs  /74   Wt 72.1 kg (159 lb)   Estimated body mass index is 30.04 kg/m² as calculated from the following:    Height as of 23: 154.9 cm (61\").    Weight as of this encounter: 72.1 kg (159 lb).    Ultrasound Impression:   See viewpoint      Assessment and Plan     Shweta Canela is a 28 y.o.  27w4d     Diagnoses and all orders for this visit:    1. Monochorionic diamniotic twin gestation in first trimester (Primary)  Assessment & Plan:  Appropriate interval growth with no evidence of TTTS   Follow up with MFM in 4 weeks (scan in 2 weeks with Dr Curtis)       2. Cervical shortening affecting pregnancy  Assessment & Plan:  Denies LOF, VB, CTX.   Has not started vaginal progesterone. Would hold on the rx at this point as has been > 2 weeks since discharge and patient almost 28 weeks GA            Follow Up  4 weeks     I spent 20 minutes caring for the patient on the day of service. This included: obtaining or reviewing a separately obtained medical history, reviewing patient records, performing a medically appropriate exam and/or evaluation, counseling or educating the patient/family/caregiver, ordering medications, labs, and/or procedures and documenting such in the medical record. This does not include time spent on review and interpretation of other tests such as fetal ultrasound or the performance of other procedures such as " amniocentesis or CVS.    Meena Robles MD FACOG  Maternal Fetal Medicine, [facility]     2023

## 2023-12-21 LAB
BLD GP AB SCN SERPL QL: NEGATIVE
GLUCOSE 1H P 50 G GLC PO SERPL-MCNC: 130 MG/DL (ref 65–139)

## 2024-01-01 ENCOUNTER — HOSPITAL ENCOUNTER (INPATIENT)
Facility: HOSPITAL | Age: 29
LOS: 3 days | Discharge: HOME | End: 2024-01-04
Attending: OBSTETRICS & GYNECOLOGY | Admitting: OBSTETRICS & GYNECOLOGY
Payer: COMMERCIAL

## 2024-01-01 PROBLEM — O60.03 PRETERM LABOR IN THIRD TRIMESTER WITHOUT DELIVERY: Status: ACTIVE | Noted: 2024-01-01

## 2024-01-01 LAB
ABO GROUP BLD: NORMAL
ALP SERPL-CCNC: 90 U/L (ref 39–117)
ALT SERPL W P-5'-P-CCNC: 10 U/L (ref 1–33)
AST SERPL-CCNC: 16 U/L (ref 1–32)
BILIRUB SERPL-MCNC: 0.2 MG/DL (ref 0–1.2)
BILIRUB UR QL STRIP: NEGATIVE
BLD GP AB SCN SERPL QL: NEGATIVE
CLARITY UR: CLEAR
COLOR UR: YELLOW
CREAT SERPL-MCNC: 0.55 MG/DL (ref 0.57–1)
DEPRECATED RDW RBC AUTO: 42.7 FL (ref 37–54)
ERYTHROCYTE [DISTWIDTH] IN BLOOD BY AUTOMATED COUNT: 12.5 % (ref 12.3–15.4)
GLUCOSE UR STRIP-MCNC: NEGATIVE MG/DL
HCT VFR BLD AUTO: 32.3 % (ref 34–46.6)
HGB BLD-MCNC: 11.6 G/DL (ref 12–15.9)
HGB UR QL STRIP.AUTO: NEGATIVE
KETONES UR QL STRIP: ABNORMAL
LDH SERPL-CCNC: 202 U/L (ref 135–214)
LEUKOCYTE ESTERASE UR QL STRIP.AUTO: NEGATIVE
MCH RBC QN AUTO: 33.7 PG (ref 26.6–33)
MCHC RBC AUTO-ENTMCNC: 35.9 G/DL (ref 31.5–35.7)
MCV RBC AUTO: 93.9 FL (ref 79–97)
NITRITE UR QL STRIP: NEGATIVE
PH UR STRIP.AUTO: 6.5 [PH] (ref 5–8)
PLATELET # BLD AUTO: 132 10*3/MM3 (ref 140–450)
PMV BLD AUTO: 10.2 FL (ref 6–12)
PROT UR QL STRIP: NEGATIVE
RBC # BLD AUTO: 3.44 10*6/MM3 (ref 3.77–5.28)
RH BLD: POSITIVE
SP GR UR STRIP: <=1.005 (ref 1–1.03)
T&S EXPIRATION DATE: NORMAL
URATE SERPL-MCNC: 5 MG/DL (ref 2.4–5.7)
UROBILINOGEN UR QL STRIP: ABNORMAL
WBC NRBC COR # BLD AUTO: 12.1 10*3/MM3 (ref 3.4–10.8)

## 2024-01-01 PROCEDURE — 84075 ASSAY ALKALINE PHOSPHATASE: CPT | Performed by: OBSTETRICS & GYNECOLOGY

## 2024-01-01 PROCEDURE — 83615 LACTATE (LD) (LDH) ENZYME: CPT | Performed by: OBSTETRICS & GYNECOLOGY

## 2024-01-01 PROCEDURE — 59025 FETAL NON-STRESS TEST: CPT | Performed by: OBSTETRICS & GYNECOLOGY

## 2024-01-01 PROCEDURE — 84460 ALANINE AMINO (ALT) (SGPT): CPT | Performed by: OBSTETRICS & GYNECOLOGY

## 2024-01-01 PROCEDURE — 99223 1ST HOSP IP/OBS HIGH 75: CPT | Performed by: OBSTETRICS & GYNECOLOGY

## 2024-01-01 PROCEDURE — 84550 ASSAY OF BLOOD/URIC ACID: CPT | Performed by: OBSTETRICS & GYNECOLOGY

## 2024-01-01 PROCEDURE — 25010000002 BETAMETHASONE ACET & SOD PHOS PER 4 MG: Performed by: OBSTETRICS & GYNECOLOGY

## 2024-01-01 PROCEDURE — 81003 URINALYSIS AUTO W/O SCOPE: CPT | Performed by: OBSTETRICS & GYNECOLOGY

## 2024-01-01 PROCEDURE — 86900 BLOOD TYPING SEROLOGIC ABO: CPT | Performed by: OBSTETRICS & GYNECOLOGY

## 2024-01-01 PROCEDURE — 82565 ASSAY OF CREATININE: CPT | Performed by: OBSTETRICS & GYNECOLOGY

## 2024-01-01 PROCEDURE — 85027 COMPLETE CBC AUTOMATED: CPT | Performed by: OBSTETRICS & GYNECOLOGY

## 2024-01-01 PROCEDURE — 25010000002 PENICILLIN G POTASSIUM PER 600000 UNITS: Performed by: OBSTETRICS & GYNECOLOGY

## 2024-01-01 PROCEDURE — 86901 BLOOD TYPING SEROLOGIC RH(D): CPT | Performed by: OBSTETRICS & GYNECOLOGY

## 2024-01-01 PROCEDURE — 86850 RBC ANTIBODY SCREEN: CPT | Performed by: OBSTETRICS & GYNECOLOGY

## 2024-01-01 PROCEDURE — 84450 TRANSFERASE (AST) (SGOT): CPT | Performed by: OBSTETRICS & GYNECOLOGY

## 2024-01-01 PROCEDURE — 82247 BILIRUBIN TOTAL: CPT | Performed by: OBSTETRICS & GYNECOLOGY

## 2024-01-01 RX ORDER — SODIUM CHLORIDE 0.9 % (FLUSH) 0.9 %
10 SYRINGE (ML) INJECTION AS NEEDED
Status: DISCONTINUED | OUTPATIENT
Start: 2024-01-01 | End: 2024-01-04 | Stop reason: HOSPADM

## 2024-01-01 RX ORDER — BETAMETHASONE SODIUM PHOSPHATE AND BETAMETHASONE ACETATE 3; 3 MG/ML; MG/ML
12 INJECTION, SUSPENSION INTRA-ARTICULAR; INTRALESIONAL; INTRAMUSCULAR; SOFT TISSUE EVERY 24 HOURS
Qty: 4 ML | Refills: 0 | Status: COMPLETED | OUTPATIENT
Start: 2024-01-01 | End: 2024-01-02

## 2024-01-01 RX ORDER — ONDANSETRON 2 MG/ML
4 INJECTION INTRAMUSCULAR; INTRAVENOUS EVERY 8 HOURS PRN
Status: DISCONTINUED | OUTPATIENT
Start: 2024-01-01 | End: 2024-01-04 | Stop reason: HOSPADM

## 2024-01-01 RX ORDER — ACETAMINOPHEN 500 MG
1000 TABLET ORAL EVERY 4 HOURS PRN
Status: DISCONTINUED | OUTPATIENT
Start: 2024-01-01 | End: 2024-01-04 | Stop reason: HOSPADM

## 2024-01-01 RX ORDER — FAMOTIDINE 20 MG/1
20 TABLET, FILM COATED ORAL
Status: DISCONTINUED | OUTPATIENT
Start: 2024-01-02 | End: 2024-01-04 | Stop reason: HOSPADM

## 2024-01-01 RX ORDER — BISACODYL 10 MG
10 SUPPOSITORY, RECTAL RECTAL DAILY PRN
Status: DISCONTINUED | OUTPATIENT
Start: 2024-01-01 | End: 2024-01-04 | Stop reason: HOSPADM

## 2024-01-01 RX ORDER — SODIUM CHLORIDE 0.9 % (FLUSH) 0.9 %
10 SYRINGE (ML) INJECTION EVERY 12 HOURS SCHEDULED
Status: DISCONTINUED | OUTPATIENT
Start: 2024-01-01 | End: 2024-01-04 | Stop reason: HOSPADM

## 2024-01-01 RX ORDER — DOCUSATE SODIUM 100 MG/1
100 CAPSULE, LIQUID FILLED ORAL 2 TIMES DAILY PRN
Status: DISCONTINUED | OUTPATIENT
Start: 2024-01-01 | End: 2024-01-04 | Stop reason: HOSPADM

## 2024-01-01 RX ORDER — MAGNESIUM SULFATE HEPTAHYDRATE 40 MG/ML
2 INJECTION, SOLUTION INTRAVENOUS CONTINUOUS
Status: DISCONTINUED | OUTPATIENT
Start: 2024-01-01 | End: 2024-01-03

## 2024-01-01 RX ORDER — SODIUM CHLORIDE 9 MG/ML
40 INJECTION, SOLUTION INTRAVENOUS AS NEEDED
Status: DISCONTINUED | OUTPATIENT
Start: 2024-01-01 | End: 2024-01-04 | Stop reason: HOSPADM

## 2024-01-01 RX ORDER — ASPIRIN 81 MG/1
81 TABLET, CHEWABLE ORAL NIGHTLY
Status: DISCONTINUED | OUTPATIENT
Start: 2024-01-02 | End: 2024-01-04 | Stop reason: HOSPADM

## 2024-01-01 RX ORDER — DEXTROSE AND SODIUM CHLORIDE 5; .2 G/100ML; G/100ML
100 INJECTION, SOLUTION INTRAVENOUS CONTINUOUS
Status: DISCONTINUED | OUTPATIENT
Start: 2024-01-01 | End: 2024-01-04 | Stop reason: HOSPADM

## 2024-01-01 RX ORDER — PENICILLIN G 3000000 [IU]/50ML
3 INJECTION, SOLUTION INTRAVENOUS EVERY 4 HOURS
Qty: 600 ML | Refills: 0 | Status: DISPENSED | OUTPATIENT
Start: 2024-01-02 | End: 2024-01-04

## 2024-01-01 RX ORDER — PRENATAL VIT/IRON FUM/FOLIC AC 27MG-0.8MG
1 TABLET ORAL NIGHTLY
Status: DISCONTINUED | OUTPATIENT
Start: 2024-01-02 | End: 2024-01-04 | Stop reason: HOSPADM

## 2024-01-01 RX ORDER — ONDANSETRON 4 MG/1
8 TABLET, FILM COATED ORAL EVERY 8 HOURS PRN
Status: DISCONTINUED | OUTPATIENT
Start: 2024-01-01 | End: 2024-01-04 | Stop reason: HOSPADM

## 2024-01-01 RX ORDER — LIDOCAINE HYDROCHLORIDE 10 MG/ML
0.5 INJECTION, SOLUTION EPIDURAL; INFILTRATION; INTRACAUDAL; PERINEURAL ONCE AS NEEDED
Status: DISCONTINUED | OUTPATIENT
Start: 2024-01-01 | End: 2024-01-04 | Stop reason: HOSPADM

## 2024-01-01 RX ADMIN — DEXTROSE AND SODIUM CHLORIDE 100 ML/HR: 5; 200 INJECTION, SOLUTION INTRAVENOUS at 21:10

## 2024-01-01 RX ADMIN — BETAMETHASONE SODIUM PHOSPHATE AND BETAMETHASONE ACETATE 12 MG: 3; 3 INJECTION, SUSPENSION INTRA-ARTICULAR; INTRALESIONAL; INTRAMUSCULAR at 21:34

## 2024-01-01 RX ADMIN — SODIUM CHLORIDE 5 MILLION UNITS: 900 INJECTION INTRAVENOUS at 21:16

## 2024-01-02 ENCOUNTER — APPOINTMENT (OUTPATIENT)
Dept: WOMENS IMAGING | Facility: HOSPITAL | Age: 29
End: 2024-01-02
Payer: COMMERCIAL

## 2024-01-02 PROCEDURE — 76820 UMBILICAL ARTERY ECHO: CPT | Performed by: OBSTETRICS & GYNECOLOGY

## 2024-01-02 PROCEDURE — 25010000002 MAGNESIUM SULFATE 20 GM/500ML SOLUTION: Performed by: OBSTETRICS & GYNECOLOGY

## 2024-01-02 PROCEDURE — 76820 UMBILICAL ARTERY ECHO: CPT

## 2024-01-02 PROCEDURE — 25010000002 BETAMETHASONE ACET & SOD PHOS PER 4 MG: Performed by: OBSTETRICS & GYNECOLOGY

## 2024-01-02 PROCEDURE — 76816 OB US FOLLOW-UP PER FETUS: CPT

## 2024-01-02 PROCEDURE — 99232 SBSQ HOSP IP/OBS MODERATE 35: CPT | Performed by: OBSTETRICS & GYNECOLOGY

## 2024-01-02 PROCEDURE — 25010000002 PENICILLIN G POTASSIUM PER 600000 UNITS: Performed by: OBSTETRICS & GYNECOLOGY

## 2024-01-02 PROCEDURE — 76816 OB US FOLLOW-UP PER FETUS: CPT | Performed by: OBSTETRICS & GYNECOLOGY

## 2024-01-02 RX ORDER — CIPROFLOXACIN AND DEXAMETHASONE 3; 1 MG/ML; MG/ML
4 SUSPENSION/ DROPS AURICULAR (OTIC) 2 TIMES DAILY
Status: DISCONTINUED | OUTPATIENT
Start: 2024-01-02 | End: 2024-01-02

## 2024-01-02 RX ORDER — PREDNISOLONE ACETATE 10 MG/ML
2 SUSPENSION/ DROPS OPHTHALMIC EVERY 12 HOURS SCHEDULED
Status: DISPENSED | OUTPATIENT
Start: 2024-01-02 | End: 2024-01-04

## 2024-01-02 RX ORDER — OFLOXACIN 3 MG/ML
2 SOLUTION/ DROPS OPHTHALMIC 4 TIMES DAILY
Status: DISCONTINUED | OUTPATIENT
Start: 2024-01-02 | End: 2024-01-04 | Stop reason: HOSPADM

## 2024-01-02 RX ORDER — SIMETHICONE 80 MG
80 TABLET,CHEWABLE ORAL 4 TIMES DAILY PRN
Status: DISCONTINUED | OUTPATIENT
Start: 2024-01-02 | End: 2024-01-04 | Stop reason: HOSPADM

## 2024-01-02 RX ORDER — PSEUDOEPHEDRINE HCL 30 MG
30 TABLET ORAL EVERY 4 HOURS PRN
Status: DISCONTINUED | OUTPATIENT
Start: 2024-01-02 | End: 2024-01-04 | Stop reason: HOSPADM

## 2024-01-02 RX ADMIN — BETAMETHASONE SODIUM PHOSPHATE AND BETAMETHASONE ACETATE 12 MG: 3; 3 INJECTION, SUSPENSION INTRA-ARTICULAR; INTRALESIONAL; INTRAMUSCULAR at 21:35

## 2024-01-02 RX ADMIN — FAMOTIDINE 20 MG: 20 TABLET, FILM COATED ORAL at 05:15

## 2024-01-02 RX ADMIN — ASPIRIN 81 MG: 81 TABLET, CHEWABLE ORAL at 21:35

## 2024-01-02 RX ADMIN — MAGNESIUM SULFATE IN WATER 2 G/HR: 20 INJECTION, SOLUTION INTRAVENOUS at 05:12

## 2024-01-02 RX ADMIN — PENICILLIN G 3 MILLION UNITS: 3000000 INJECTION, SOLUTION INTRAVENOUS at 05:12

## 2024-01-02 RX ADMIN — PREDNISOLONE ACETATE 2 DROP: 10 SUSPENSION/ DROPS OPHTHALMIC at 21:36

## 2024-01-02 RX ADMIN — PREDNISOLONE ACETATE 2 DROP: 10 SUSPENSION/ DROPS OPHTHALMIC at 14:17

## 2024-01-02 RX ADMIN — SIMETHICONE 80 MG: 80 TABLET, CHEWABLE ORAL at 17:44

## 2024-01-02 RX ADMIN — DEXTROSE AND SODIUM CHLORIDE 100 ML/HR: 5; 200 INJECTION, SOLUTION INTRAVENOUS at 06:53

## 2024-01-02 RX ADMIN — DEXTROSE AND SODIUM CHLORIDE 100 ML/HR: 5; 200 INJECTION, SOLUTION INTRAVENOUS at 17:44

## 2024-01-02 RX ADMIN — PENICILLIN G 3 MILLION UNITS: 3000000 INJECTION, SOLUTION INTRAVENOUS at 21:35

## 2024-01-02 RX ADMIN — PRENATAL VITAMINS-IRON FUMARATE 27 MG IRON-FOLIC ACID 0.8 MG TABLET 1 TABLET: at 21:35

## 2024-01-02 RX ADMIN — PENICILLIN G 3 MILLION UNITS: 3000000 INJECTION, SOLUTION INTRAVENOUS at 01:14

## 2024-01-02 RX ADMIN — ASPIRIN 81 MG: 81 TABLET, CHEWABLE ORAL at 00:10

## 2024-01-02 RX ADMIN — FAMOTIDINE 20 MG: 20 TABLET, FILM COATED ORAL at 17:36

## 2024-01-02 RX ADMIN — PRENATAL VITAMINS-IRON FUMARATE 27 MG IRON-FOLIC ACID 0.8 MG TABLET 1 TABLET: at 00:10

## 2024-01-02 RX ADMIN — PENICILLIN G 3 MILLION UNITS: 3000000 INJECTION, SOLUTION INTRAVENOUS at 12:40

## 2024-01-02 RX ADMIN — PENICILLIN G 3 MILLION UNITS: 3000000 INJECTION, SOLUTION INTRAVENOUS at 09:14

## 2024-01-02 RX ADMIN — PSEUDOEPHEDRINE HCL 30 MG: 30 TABLET, FILM COATED ORAL at 17:04

## 2024-01-02 RX ADMIN — MAGNESIUM SULFATE IN WATER 2 G/HR: 20 INJECTION, SOLUTION INTRAVENOUS at 18:48

## 2024-01-02 NOTE — PROGRESS NOTES
Maternal-Fetal Medicine   Progress Note    Patient name: Shweta Canela  YOB: 1995   MRN: [id number]  Admission Date: 2024  Date of Service: 2024  Referring Provider: Selina Curtis       Shweta Canela is a 28 y.o.    at 29w3d  admitted on 2024 for  labor in third trimester without delivery    Hospital day 1    Chief Complaint   Patient presents with    pressure    Contractions       Diagnoses:      labor in third trimester without delivery       Subjective:      Shweta has no complaints today.   Patient denies  headache:   Patient denies  right upper quadrant pain:   Reports fetal movement is normal  Denies leakage of amniotic fluid.  Denies vaginal bleeding    Objective:     Vital signs:  Temp:  [97.7 °F (36.5 °C)-98 °F (36.7 °C)] 97.7 °F (36.5 °C)  Heart Rate:  [] 93  Resp:  [14-16] 16  BP: ()/(55-74) 118/74    Abdomen: soft, nontender  Uterus: gravid, nontender  Extremities: nontender; no edema     Fetal heart rate tracing review  FHT's: Category 1  TOCO: irregular    Cervix: last check      Most recent ultrasound:  2024    Medications:  aspirin, 81 mg, Oral, Nightly  betamethasone acetate-betamethasone sodium phosphate, 12 mg, Intramuscular, Q24H  famotidine, 20 mg, Oral, BID AC  penicillin g (potassium), 3 Million Units, Intravenous, Q4H  prenatal vitamin, 1 tablet, Oral, Nightly  sodium chloride, 10 mL, Intravenous, Q12H         acetaminophen    bisacodyl    docusate sodium    lidocaine PF 1%    ondansetron **OR** ondansetron    pseudoephedrine    sodium chloride    sodium chloride    Labs:  Lab Results   Component Value Date    HGB 11.6 (L) 2024     Lab Results   Component Value Date    GLUCOSE 79 2023         Assessment/Plan:      Shweta is a 28 y.o.    at 29w3d.  1.   Active Hospital Problems    Diagnosis     ** labor in third trimester without delivery       2.  Twin gestation  4.  Possible  DEEJAY.    Plan:   1.  Patient will receive ultrasound today has not had no contractions.  2.  Patient has right ear pain.  Otoscopic examination revealed a ear tube present normal eardrum no inflammation seen.  Patient is already on penicillin and has a history of having ear tubes which are supposed to fall out spontaneously.  I think this is just congestion and we will add Sudafed.  3.  Status post magnesium sulfate and steroids.  4.  If stable later once steroid window is may be able to go home in the next day or so.    I spent 15 minutes with this patient of which greater than 50% was face to face counseling and coordination of care.  All questions were answered to the best of my ablity.    Comfort Amos MD  1/2/2024  08:10 EST

## 2024-01-02 NOTE — PAYOR COMM NOTE
"Armando Canela (28 y.o. Female)       ID#[id number]      1995    ADDRESS:  [address] [place] [address]  PHONE #[phone]    MEDICAL INPATIENT ADMISSION.    FACILITY:  [facility]   NPI #[id number]   TAX ID#[id number]  [address] [address]  PHONE #[phone]    PHYSICIAN:  DR IDRIS CURTIS   NPI#[id number]  [address] [facility] [address]  [place]  [address]  PHONE# [phone]    DIAGNOSIS CODE:  O60.03   LABOR    FROM: Henna Dillon LPN, Utilization Review  Phone #[phone]  Fax #[phone]        Date of Birth   1995    Social Security Number       Address   [address] [place] [address]    Home Phone   [phone]    MRN   [id number]       Mu-ism   Episcopalian    Marital Status                               Admission Date   24    Admission Type   Elective    Admitting Provider   Selina Curtis MD    Attending Provider   Selina Curtis MD    Department, Room/Bed   [facility] ANTEPARTUM, N329/       Discharge Date       Discharge Disposition       Discharge Destination                                 Attending Provider: Selina Curtis MD    Allergies: Bactrim [Sulfamethoxazole-trimethoprim], Ciprodex [Ciprofloxacin-dexamethasone]    Isolation: None   Infection: None   Code Status: CPR    Ht: 154.9 cm (61\")   Wt: 71.8 kg (158 lb 6.4 oz)    Admission Cmt: None   Principal Problem:  labor in third trimester without delivery [O60.03]                   Active Insurance as of 2024       Primary Coverage       Payor Plan Insurance Group Employer/Plan Group    [facility]        Payor Plan Address Payor Plan Phone Number Payor Plan Fax Number Effective Dates    [address] [phone]  2023 - None Entered    [facility] [id number]         Subscriber Name Subscriber Birth Date Member ID       ARMANDO CANELA 1995 [id number]       "         Secondary Coverage       Payor Plan Insurance Group Employer/Plan Group    [employer]OSS [employer] PPO [id number]       Payor Plan Address Payor Plan Phone Number Payor Plan Fax Number Effective Dates    [address] [phone]  2023 - None Entered    [facility] [address]         Subscriber Name Subscriber Birth Date Member ID       ARMANDO CANELA 1995 [id number]                     Emergency Contacts        (Rel.) Home Phone Work Phone Mobile Phone    Sonya Keyes (Mother) [phone] -- [phone]    JO CANELA (Spouse) [phone] -- [phone]              Insurance Information                  / [facility] Phone: [phone]    Subscriber: [place], Armandoyfn Bart[place] Subscriber#: [id number]    Group#: -- Precert#: --        [employer]HE[employer]UE [employer]OSS/[employer]UE [employer] PPO Phone: [phone]    Subscriber: Armando Canela Subscriber#: [id number]    Group#: [id number] Precert#: --             History & Physical        High, Steve BOWMAN MD at 24 2106          Armando Canela  1995  [id number]  [id number]    CC: contractions, pelvic pressure  HPI:  Patient is 28 y.o. female   currently at 29w2d presents with c/o pelvic pressure, onset earlier today.  Contractions, onset ~1400, intermittent, rates 2/10, no assoc bleeding or leaking.  Good FM X 2.  No recent intercourse.  PNC comp by mono/di twins with hx  contractions/labor at  24 5/7 weeks, s/p  steroids.      PMH:  Current meds: PNV, procardia 10mg prn, baby ASA, amoxicillin (otitis currently)  Illnesses: heart murmer (no w/u), migraines, ear infections  Surgeries: ear tubes, oral surg  Allergies: Bactrim- flu-like symptoms    Past OB History:       OB History    Para Term  AB Living   1 0 0 0 0 0   SAB IAB Ectopic Molar Multiple Live Births   0 0 0 0 0 0      # Outcome Date GA Lbr Khadar/2nd Weight Sex Delivery Anes PTL Lv   1  Current               Obstetric Comments   FOB #1 Pregnancy #1  current            SH: tob neg , EtOH neg, drugs neg    General ROS: contractions, D (X4, last one at 1400), edema.   All other systems reviewed and are negative.      Physical Examination: General appearance - alert, well appearing, and in no distress  Vital signs - There were no vitals taken for this visit.  HEENT: normocephalic, atraumatic,oropharynx clear, appearance of ears and nose normal  Neck - supple, no significant adenopathy, no thyromegaly  Lymphatics - no palpable lymphadenopathy in the neck or groin, no hepatosplenomegaly  Chest - clear to auscultation, no wheezes, rales or rhonchi, respiratory effort non-labored  Heart - normal rate, regular rhythm, no murmurs, rubs, clicks or gallops, no JVD, no lower extremity edema  Abdomen - soft, nontender, nondistended, no masses, no hepatosplenomegaly  no rebound tenderness noted, bowel sounds normal  Vaginal Exam: 1-2/80%/ballot, no blood in vault,external genitalia normal  Extremities - no pedal edema noted, no calf tend  Skin -warm and dry, normal coloration and turgor, no rashes, no suspicious skin lesions noted      Fetal monitoring: indication contractions, mono/di twins , onset  , offset  , baseline 135(A),140(B) , mod BTB variability(A and B) , multiple accels (15 X 15)(A and B), no decels(A or B), occas contractions, interpretation reactive NST (A and B)    Radiology     Assessment 1)IUP 29 2/7 weeks   2) labor   3)mono/di twins   4)hx  labor this preg, s/p steroids at 24 5/7 weeks    Plan 1)admit   2)magnesium   3)rescue steroids   4)PCN    Steve Thomas MD  2024  21:07 EST                                                                       Electronically signed by Steve Thomas MD at 24 3358       Vital Signs (last day)       Date/Time Temp Temp src Pulse Resp BP Patient Position SpO2    24 1140 -- -- 93 -- 115/77 -- --    24 1139 -- --  91 -- -- -- 98    01/02/24 1033 -- -- 85 16 112/72 Sitting --    01/02/24 1029 -- -- 91 -- -- -- 100    01/02/24 0913 -- -- 88 14 109/71 Lying 98    01/02/24 0759 -- -- 84 -- -- -- 98    01/02/24 0747 97.9 (36.6) Oral 86 16 118/70 Sitting 99    01/02/24 0655 -- -- 93 -- -- -- 100    01/02/24 0650 -- -- 88 16 118/74 Sitting 100    01/02/24 0600 -- -- 87 16 111/72 -- 98    01/02/24 0515 -- -- 89 14 112/72 -- 98    01/02/24 0415 97.7 (36.5) Oral 86 14 98/57 Lying 99    01/02/24 0310 -- -- 85 16 108/58 -- 98    01/02/24 0215 -- -- 76 16 99/55 -- 98    01/02/24 0115 -- -- 84 16 103/57 -- 98    01/02/24 0010 97.8 (36.6) Oral 91 16 111/73 Lying 98    01/01/24 2305 -- -- 92 14 108/71 -- 98    01/01/24 2215 -- -- 91 16 107/70 -- 98    01/01/24 2150 -- -- 102 16 111/74 Sitting 99    01/01/24 2145 -- -- 100 16 108/69 Sitting 98    01/01/24 2143 -- -- 98 -- -- -- 98    01/01/24 2139 -- -- 101 16 112/73 Sitting --    01/01/24 2138 -- -- 102 -- -- -- --    01/01/24 2135 98 (36.7) Oral 98 16 103/69 Sitting 98    01/01/24 2133 -- -- 95 -- -- -- --    01/01/24 2128 98 (36.7) Oral 98 16 100/61 Sitting --          Facility-Administered Medications as of 1/2/2024   Medication Dose Route Frequency Provider Last Rate Last Admin    acetaminophen (TYLENOL) tablet 1,000 mg  1,000 mg Oral Q4H PRN High, Steve BOWMAN MD        aspirin chewable tablet 81 mg  81 mg Oral Nightly High, Steve BOWMAN MD   81 mg at 01/02/24 0010    betamethasone acetate-betamethasone sodium phosphate (CELESTONE SOLUSPAN) injection 12 mg  12 mg Intramuscular Q24H High, Steve BOWMAN MD   12 mg at 01/01/24 2134    bisacodyl (DULCOLAX) suppository 10 mg  10 mg Rectal Daily PRN High, Steve BOWMAN MD        dextrose 5 % and sodium chloride 0.2 % infusion  100 mL/hr Intravenous Continuous High, Steve BOWMAN  mL/hr at 01/02/24 0653 100 mL/hr at 01/02/24 0653    docusate sodium (COLACE) capsule 100 mg  100 mg Oral BID PRN High, Steve BOWMAN MD        famotidine (PEPCID) tablet 20 mg  20  mg Oral BID AC High, Steve BOWMAN MD   20 mg at 01/02/24 0515    lidocaine PF 1% (XYLOCAINE) injection 0.5 mL  0.5 mL Intradermal Once PRN High, Steve BOWMAN MD        magnesium sulfate 20 GM/500ML infusion  2 g/hr Intravenous Continuous High, Steve BOWMAN MD 50 mL/hr at 01/02/24 1033 2 g/hr at 01/02/24 1033    [COMPLETED] magnesium sulfate bolus from bag 0.04 g/mL solution 4 g  4 g Intravenous Once High, Steve BOWMAN MD   4 g at 01/01/24 2131    ofloxacin (OCUFLOX) 0.3 % ophthalmic solution 2 drop  2 drop Both Eyes 4x Daily Comfort Amos MD        ondansetron (ZOFRAN) tablet 8 mg  8 mg Oral Q8H PRN High, Steve BOWMAN MD        Or    ondansetron (ZOFRAN) injection 4 mg  4 mg Intravenous Q8H PRN High, Steve BOWMAN MD        [COMPLETED] penicillin G potassium 5 Million Units in sodium chloride 0.9 % 100 mL IVPB  5 Million Units Intravenous Once High, Steve BOWMAN MD   5 Million Units at 01/01/24 2116    Followed by    penicillin G in iso-osmotic dextrose IVPB 3 million units (premix)  3 Million Units Intravenous Q4H High, Steve BOWMAN MD   3 Million Units at 01/02/24 1240    prednisoLONE acetate (PRED FORTE) 1 % ophthalmic suspension 2 drop  2 drop Both Eyes Q12H Comfort Amos MD        prenatal vitamin tablet 1 tablet  1 tablet Oral Nightly High, Steve BOWMAN MD   1 tablet at 01/02/24 0010    pseudoephedrine (SUDAFED) tablet 30 mg  30 mg Oral Q4H PRN Comfort Amos MD        sodium chloride 0.9 % flush 10 mL  10 mL Intravenous Q12H HighSteve MD        sodium chloride 0.9 % flush 10 mL  10 mL Intravenous PRN HighSteve MD        sodium chloride 0.9 % infusion 40 mL  40 mL Intravenous PRN High, Steve BOWMAN MD         Lab Results (last 24 hours)       Procedure Component Value Units Date/Time    Preeclampsia Panel [[id number]]  (Abnormal) Collected: 01/01/24 2109    Specimen: Blood Updated: 01/01/24 2213     Alkaline Phosphatase 90 U/L      ALT (SGPT) 10 U/L      AST (SGOT) 16 U/L      Creatinine 0.55 mg/dL      Total Bilirubin 0.2  mg/dL       U/L      Comment: Specimen hemolyzed.  Results may be affected.        Uric Acid 5.0 mg/dL     CBC (No Diff) [[id number]]  (Abnormal) Collected: 24    Specimen: Blood Updated: 24     WBC 12.10 10*3/mm3      RBC 3.44 10*6/mm3      Hemoglobin 11.6 g/dL      Hematocrit 32.3 %      MCV 93.9 fL      MCH 33.7 pg      MCHC 35.9 g/dL      RDW 12.5 %      RDW-SD 42.7 fl      MPV 10.2 fL      Platelets 132 10*3/mm3     Urinalysis With Microscopic If Indicated (No Culture) - Urine, Clean Catch [[id number]]  (Abnormal) Collected: 24    Specimen: Urine, Clean Catch Updated: 24     Color, UA Yellow     Appearance, UA Clear     pH, UA 6.5     Specific Gravity, UA <=1.005     Glucose, UA Negative     Ketones, UA Trace     Bilirubin, UA Negative     Blood, UA Negative     Protein, UA Negative     Leuk Esterase, UA Negative     Nitrite, UA Negative     Urobilinogen, UA 0.2 E.U./dL    Narrative:      Urine microscopic not indicated.          Imaging Results (Last 24 Hours)       Procedure Component Value Units Date/Time    [facility]  Diagnostic Center [[id number]] Collected: 24     Updated: 24    Narrative:      PAT NAME: ARMANDO DIETZ  MED REC#: [id number]  BIRTH DA: [id number]  PAT GEND: F  ACCOUNT#: [id number]  PAT TYPE: I  EXAM YEIMI: [id number]  REF PHYS INDIRA RODAS  ACCESSION [id number]      Comparison Studies  =================    The findings of this study are compared to the prior ultrasound study dated 23      Patient Status  ============    Inpatient-Portable      Indication  ========    Mo/Di Twins, short cervix      Maternal Assessment  ==================    Height 154 cm  Height (ft) 5 ft  Height (in) 1 in      Method  =======    Transabdominal ultrasound examination. View: Adequate view      Pregnancy  =========    Twin pregnancy. Monochorionic-diamniotic. Number of fetuses: 2      Dating  ======    LMP on: 6/10/2023  GA  by LMP 29 w + 3 d  HUA by LMP: 3/16/2024  GA by prior assessment 29 w + 3 d  HUA by prior assessment: 3/16/2024  Ultrasound examination on: 1/2/2024  GA by U/S based upon: AC, BPD, Femur, HC  GA by U/S 30 w + 4 d  HUA by U/S: 3/8/2024  GA by U/S based upon (Fetus 2): AC, BPD, Femur, HC  GA by U/S (Fetus 2) 29 w + 3 d  HUA by U/S (Fetus 2): 3/16/2024  Method of dating: Restore dating from previous exam  Previous dating: based on stated HUA, selected on 12/11/2023  Agreed HUA of previous dating: 3/16/2024  Assigned: based on stated HUA, selected on 12/11/2023  Assigned GA 29 w + 3 d  Assigned HUA: 3/16/2024  Pregnancy length 280 d      Fetal Biometry  ============    Standard  BPD 77.3 mm  31w 0d        84%        Hadlock    .0 mm  32w 5d        99%        Payton    .4 mm  31w 2d        73%        Hadlock    Cerebellum tr 37.9 mm  31w 0d        90%        Hill    .9 mm  30w 4d        78%        Hadlock    Femur 56.0 mm  29w 3d        36%        Hadlock    HC / AC 1.08    EFW 1,556 g          65%        Angel    EFW discordance 9.2 %  EFW (lb) 3 lb  EFW (oz) 7 oz  EFW by: Hadlock (BPD-HC-AC-FL)  Extended  Cav. septi pel. tr 8.0 mm  CM 6.2 mm          28%        Nicolaides    Head / Face / Neck  Cephalic index 0.77          23%        Nicolaides    Extremities / Bony Struc  FL / BPD 0.72    FL / HC 0.20    FL / AC 0.21    Other Structures   bpm      Fetal Biometry  ============    Standard  BPD 72.7 mm  29w 1d        30%        Hadlock    OFD 94.7 mm  30w 4d        79%        Payton    .7 mm  29w 4d        19%        Hadlock    Cerebellum tr 44.3 mm  34w 2d        >99%        Hill    .7 mm  30w 3d        73%        Hadlock    Femur 53.3 mm  28w 2d        10%        Hadlock    HC / AC 1.03    EFW 1,413 g          53%        Angel    EFW discordance 9.2 %  EFW (lb) 3 lb  EFW (oz) 2 oz  EFW by: Hadlock (BPD-HC-AC-FL)  Extended  CM 7.7 mm          73%        Nicolaides    Head /  Face / Neck  Cephalic index 0.77          25%        Nicolaides    Extremities / Bony Struc  FL / BPD 0.73    FL / HC 0.20    FL / AC 0.20    Other Structures   bpm      General Evaluation  ================    Cardiac activity present.  bpm.  Fetal movements present.  Presentation cephalic, maternal right-NOT presenting.  Placenta posterior, right lateral.  Umbilical cord 3 vessel cord.  Amniotic fluid Amount of AF: normal. MVP 7.1 cm.      General Evaluation  ================    Cardiac activity present.  bpm.  Fetal movements present.  Presentation breech, maternal left-PRESENTING.  Placenta posterior.  Umbilical cord 3 vessel cord.  Amniotic fluid Amount of AF: normal. MVP 7.4 cm.      Fetal Anatomy  ============    Cranium: Normal  Cavum septi pellucidi: Normal  Cerebellum: Normal  Cisterna magna: Normal  Head / Neck  Rt lateral ventricle: Normal  Lt lateral ventricle: Normal  Lips: Normal  Profile: Normal  Nose: Normal  4-chamber view: Appears normal  RVOT view: Appears normal  LVOT view: Appears normal  Heart / Thorax  3-vessel view: Appears normal  3-vessel-trachea view: Appears normal  Cord insertion: Normal  Stomach: Appears normal  Kidneys: Appears normal  Bladder: Appears normal  Gender: male  Wants to know gender: yes      Fetal Anatomy  ============    Cranium: Normal  Cavum septi pellucidi: Normal  Cerebellum: Normal  Cisterna magna: Normal  Head / Neck  Rt lateral ventricle: Normal  Lt lateral ventricle: Normal  Lips: Normal  Profile: Normal  Nose: Normal  4-chamber view: Appears normal  RVOT view: Appears normal  LVOT view: Appears normal  Heart / Thorax  3-vessel view: Appears normal  3-vessel-trachea view: Appears normal  Cord insertion: Appears normal  Stomach: Appears normal  Kidneys: Appears normal  Bladder: Appears normal  Gender: male  Wants to know gender: yes      Fetal Doppler  ===========    Arterial  Umbilical A PI 1.19          86%        Norman    Umbilical A  RI 0.71          81%        Norman    Umbilical A PS -37.69 cm/s    Umbilical A ED -11.01 cm/s  Umbilical A TAmax -22.50 cm/s    Umbilical A MD -10.78 cm/s  Umbilical A S / D 3.42          75%        Norman    Umbilical A  bpm      Fetal Doppler  ===========    Arterial  Umbilical A PI 1.02          60%        Norman    Umbilical A RI 0.66          58%        Norman    Umbilical A PS 45.08 cm/s          52%        Ebbing    Umbilical A ED 15.29 cm/s  Umbilical A TAmax 29.31 cm/s          50%        Ebbing    Umbilical A MD 15.10 cm/s  Umbilical A S / D 2.95          53%        Norman    Umbilical A  bpm      Impression  =========    Size consistent with dates both twins.    No fetal anomalies were identified.    Amniotic fluid volume is normal for both twins.    Umbilical artery S/D ratio is normal for both twins.      Recommendation  ===============    Continue in hospital management.      Coding  ======    Description: [id number] Follow Up Ultrasound  Description: [id number] Follow up Ultrasound additional fetus  Description: [id number] Doppler Umbilical Artery  Description: [id number] Doppler Umbilical Artery add'l gestation        Sonographer: Nella Gardner RDMS  Physician: Doug Milligan, MD, FACOG    Electronically signed by: Doug Milligan, MD, FACOG at: 2024/01/02 08:57          Orders (last 24 hrs)        Start     Ordered    01/02/24 1300  ofloxacin (OCUFLOX) 0.3 % ophthalmic solution 2 drop  4 Times Daily         01/02/24 1200    01/02/24 1300  prednisoLONE acetate (PRED FORTE) 1 % ophthalmic suspension 2 drop  Every 12 Hours Scheduled         01/02/24 1200    01/02/24 1245  ciprofloxacin-dexAMETHasone (CIPRODEX) 0.3-0.1 % otic suspension 4 drop  2 Times Daily,   Status:  Discontinued         01/02/24 1148    01/02/24 1035  DIET MESSAGE Quesadilla with chicken and cheese with a fruit cup  Once        Comments: [place]ues[place]illa with chicken and cheese with a fruit cup    01/02/24 1035    01/02/24  0746  [facility]  Diagnostic Center  1 Time Imaging         24 0745    24 0743  pseudoephedrine (SUDAFED) tablet 30 mg  Every 4 Hours PRN         24 0743    24 0730  famotidine (PEPCID) tablet 20 mg  2 Times Daily Before Meals         24 0100  penicillin G in iso-osmotic dextrose IVPB 3 million units (premix)  Every 4 Hours        See Hyperspace for full Linked Orders Report.    24 0030  prenatal vitamin tablet 1 tablet  Nightly         24 0030  aspirin chewable tablet 81 mg  Nightly         24 0000  Vital Signs q 4 while awake  Every 4 Hours      Comments: While the patient is awake.    24 2130  sodium chloride 0.9 % flush 10 mL  Every 12 Hours Scheduled         24 2130  dextrose 5 % and sodium chloride 0.2 % infusion  Continuous         24 2130  penicillin G potassium 5 Million Units in sodium chloride 0.9 % 100 mL IVPB  Once        See Hyperspace for full Linked Orders Report.    24 2130  magnesium sulfate bolus from bag 0.04 g/mL solution 4 g  Once         24 2130  magnesium sulfate 20 GM/500ML infusion  Continuous         24 2100  betamethasone acetate-betamethasone sodium phosphate (CELESTONE SOLUSPAN) injection 12 mg  Every 24 Hours         24  Diet: Regular/House Diet; Texture: Regular Texture (IDDSI 7); Fluid Consistency: Thin (IDDSI 0)  Diet Effective Now         24  Preeclampsia Panel  Once         24  Admit To Obstetrics Inpatient  Once         24  Code Status and Medical Interventions:  Continuous         24  Place Sequential Compression Device  Once         24  Maintain Sequential  Compression Device  Continuous         24  Vital Signs Per hospital policy  Per Hospital Policy         24  Intermittent Auscultation FOR LOW RISK PATIENTS - NST on Admission Along With Intermittent Auscultation of Fetal Heart Tones.  Per Order Details        Comments: Intermittent Auscultation FOR LOW RISK PATIENTS - NST on Admission Along With Intermittent Auscultation of Fetal Heart Tones.    24  NST Low risk >24 weeks:  Monitor for 30 minutes BID (Antepartum)  Once        Comments: Fetal monitoring/NST:  Antepartum >24 weeks monitor fetus 30 minutes twice daily    24  Antepartum Patients  <24 Weeks - Document Fetal Heart Tones Daily and PRN.  Per Order Details        Comments: For Antepartum Patients Less Than 24 Weeks - Document Fetal Heart Tones Daily & PRN.    24  Notify Physician (specified)  Until Discontinued         24  Notify physician for hyperstimulus (per hospital algorithm)  Until Discontinued         24  Notify physician if membranes ruptured, bleeding greater than 1 pad an hour, fetal heart tone abnormality, and severe pain  Until Discontinued         24  Up Ad Taisha  Until Discontinued         24  Initiate Group Beta Strep (GBS) Prophylaxis Protocol, If Criteria Met  Continuous        Comments: NO TREATMENT RECOMMENDED IF: 1) Maternal GBS Status Known Negative 2) Scheduled  Birth With Intact Membranes, Not in Labor 3) Maternal GBS Status Unknown, No Risk Factors  TREAT WITH ANTIBIOTICS IF:  1) Maternal GBS Status Known Positive 2) Maternal GBS Status Unknown With Risk Factors: a)  Previous Infant Affected By GBS Infection b) GBS Urinary Tract Infection (UTI) or Bacteriuria During Pregnancy c) Unexplained Maternal Fever (100.4F (38C) or Greater)  During Labor d)  Prolonged Rupture of Membranes (18 or More Hours) e) Gestational Age Less Than 37 Weeks    24  CBC (No Diff)  Once         24  Type & Screen  Once         24  Insert Peripheral IV  Once         24  Saline Lock & Maintain IV Access  Continuous         24  sodium chloride 0.9 % flush 10 mL  As Needed         24  sodium chloride 0.9 % infusion 40 mL  As Needed         24  lidocaine PF 1% (XYLOCAINE) injection 0.5 mL  Once As Needed         24  acetaminophen (TYLENOL) tablet 1,000 mg  Every 4 Hours PRN         24  ondansetron (ZOFRAN) tablet 8 mg  Every 8 Hours PRN        See Hyperspace for full Linked Orders Report.    24  ondansetron (ZOFRAN) injection 4 mg  Every 8 Hours PRN        See Hyperspace for full Linked Orders Report.    24  docusate sodium (COLACE) capsule 100 mg  2 Times Daily PRN         24  bisacodyl (DULCOLAX) suppository 10 mg  Daily PRN         24  Urinalysis With Microscopic If Indicated (No Culture) - Urine, Clean Catch  Once         24    Unscheduled  Position Change - For Intra-Uterine Resusitation for Hypertonus, HyperStimulation or Non-Reassuring Fetal Status  As Needed       24                     Physician Progress Notes (last 24 hours)        Comfort Amos MD at 24 0810          Maternal-Fetal Medicine   Progress Note    Patient name: Shweta Canela  YOB: 1995   MRN: [id number]  Admission Date: 2024  Date of Service: 2024  Referring Provider: Selina Curtisyden is a 28 y.o.    at 29w3d  admitted on 2024 for  labor in  third trimester without delivery    Hospital day 1    Chief Complaint   Patient presents with    pressure    Contractions       Diagnoses:      labor in third trimester without delivery       Subjective:      Shweta has no complaints today.   Patient denies  headache:   Patient denies  right upper quadrant pain:   Reports fetal movement is normal  Denies leakage of amniotic fluid.  Denies vaginal bleeding    Objective:     Vital signs:  Temp:  [97.7 °F (36.5 °C)-98 °F (36.7 °C)] 97.7 °F (36.5 °C)  Heart Rate:  [] 93  Resp:  [14-16] 16  BP: ()/(55-74) 118/74    Abdomen: soft, nontender  Uterus: gravid, nontender  Extremities: nontender; no edema     Fetal heart rate tracing review  FHT's: Category 1  TOCO: irregular    Cervix: last check      Most recent ultrasound:  2024    Medications:  aspirin, 81 mg, Oral, Nightly  betamethasone acetate-betamethasone sodium phosphate, 12 mg, Intramuscular, Q24H  famotidine, 20 mg, Oral, BID AC  penicillin g (potassium), 3 Million Units, Intravenous, Q4H  prenatal vitamin, 1 tablet, Oral, Nightly  sodium chloride, 10 mL, Intravenous, Q12H         acetaminophen    bisacodyl    docusate sodium    lidocaine PF 1%    ondansetron **OR** ondansetron    pseudoephedrine    sodium chloride    sodium chloride    Labs:  Lab Results   Component Value Date    HGB 11.6 (L) 2024     Lab Results   Component Value Date    GLUCOSE 79 2023         Assessment/Plan:      Shweta is a 28 y.o.    at 29w3d.  1.   Active Hospital Problems    Diagnosis     ** labor in third trimester without delivery       2.  Twin gestation  4.  Possible DEEJAY.    Plan:   1.  Patient will receive ultrasound today has not had no contractions.  2.  Patient has right ear pain.  Otoscopic examination revealed a ear tube present normal eardrum no inflammation seen.  Patient is already on penicillin and has a history of having ear tubes which are supposed to fall out  spontaneously.  I think this is just congestion and we will add Sudafed.  3.  Status post magnesium sulfate and steroids.  4.  If stable later once steroid window is may be able to go home in the next day or so.    I spent 15 minutes with this patient of which greater than 50% was face to face counseling and coordination of care.  All questions were answered to the best of my ablity.    Comfort Amos MD  1/2/2024  08:10 EST      Electronically signed by Comfort Amos MD at 01/02/24 0812       Consult Notes (last 24 hours)  Notes from 01/01/24 1304 through 01/02/24 1304   No notes of this type exist for this encounter.       FHR (last day)       Date/Time Fetal HR Assessment Method Fetal HR (beats/min) Fetal HR Baseline Fetal HR Variability Fetal HR Accelerations Fetal HR Decelerations Fetal HR Tracing Category    01/02/24 0815 external 120 normal range moderate (amplitude range 6 to 25 bpm) greater than/equal to 10 bpm (32 wks gest or less);lasts at least 10 seconds (32 wks gest or less) absent --    01/01/24 2100 external 140 normal range moderate (amplitude range 6 to 25 bpm) greater than/equal to 15 bpm;lasting at least 15 seconds absent --    01/01/24 2030 external 140 normal range moderate (amplitude range 6 to 25 bpm) greater than/equal to 15 bpm;lasting at least 15 seconds absent --    01/01/24 2015 external 140 normal range moderate (amplitude range 6 to 25 bpm) greater than/equal to 15 bpm;lasting at least 15 seconds absent --    01/01/24 2000 external 145 normal range moderate (amplitude range 6 to 25 bpm) absent absent --    01/01/24 1945 external 140 normal range moderate (amplitude range 6 to 25 bpm) greater than/equal to 15 bpm;lasting at least 15 seconds absent --    01/01/24 1930 external 145 normal range moderate (amplitude range 6 to 25 bpm) absent absent --          Uterine Activity (last day)       Date/Time Method Contraction Frequency (Minutes) Contraction Duration (sec) Contraction  Intensity Uterine Resting Tone Contraction Pattern    01/02/24 0800 -- 3 -- -- -- --    01/02/24 0700 external tocotransducer x4  -- -- --    01/02/24 0600 external tocotransducer x5  -- -- --    01/02/24 0500 external tocotransducer x4 100-160 -- -- --    01/02/24 0400 external tocotransducer x4 100-120 -- -- --    01/02/24 0300 external tocotransducer x5  -- -- --    01/02/24 0200 external tocotransducer x3  -- -- --    01/02/24 0100 external tocotransducer x3  -- -- --    01/02/24 0000 external tocotransducer -- 100-140 -- -- --    01/01/24 2305 external tocotransducer poor tracing -- -- soft by palpation --    01/01/24 2200 external tocotransducer -- 100 -- -- --    01/01/24 2130 external tocotransducer -- 60-90 -- -- --    01/01/24 2100 external tocotransducer -- 70-80 -- -- --    01/01/24 2030 external tocotransducer -- 60-90 -- -- --    01/01/24 2015 external tocotransducer --  -- -- --    01/01/24 2000 external tocotransducer 5-6 40-80 -- -- --    01/01/24 1945 external tocotransducer -- 50-70 -- -- --    01/01/24 1930 external tocotransducer -- -- no contractions -- --

## 2024-01-02 NOTE — PAYOR COMM NOTE
"Shweta Caneal (28 y.o. Female)     [place] Ref#[id number]     Medical Inpatient Admission.    From:Henna Dillon LPN, Utilization Review  Phone #[phone]  Fax #[phone]        Date of Birth   1995    Social Security Number       Address   [address] [place] [address]    Home Phone   [phone]    MRN   [id number]       Lutheran   Jewish    Marital Status                               Admission Date   24    Admission Type   Elective    Admitting Provider   Selina Curtis MD    Attending Provider   Selina Curtis MD    Department, Room/Bed   [facility] ANTEPARTUM, N329       Discharge Date       Discharge Disposition       Discharge Destination                                 Attending Provider: Selina Curtis MD    Allergies: Bactrim [Sulfamethoxazole-trimethoprim], Ciprodex [Ciprofloxacin-dexamethasone]    Isolation: None   Infection: None   Code Status: CPR    Ht: 154.9 cm (61\")   Wt: 71.8 kg (158 lb 6.4 oz)    Admission Cmt: None   Principal Problem:  labor in third trimester without delivery [O60.03]                   Active Insurance as of 2024       Primary Coverage       Payor Plan Insurance Group Employer/Plan Group    [facility]        Payor Plan Address Payor Plan Phone Number Payor Plan Fax Number Effective Dates    [address] [phone]  2023 - None Entered    [facility] [id number]         Subscriber Name Subscriber Birth Date Member ID       SHWETA CANELA 1995 [id number]               Secondary Coverage       Payor Plan Insurance Group Employer/Plan Group    [employer] PPO [id number]       Payor Plan Address Payor Plan Phone Number Payor Plan Fax Number Effective Dates    [address] [phone]  2023 - None Entered    [facility] [id number]         Subscriber Name Subscriber Birth Date Member ID       SHWETA CANELA 1995 [id number]  "                    Emergency Contacts        (Rel.) Home Phone Work Phone Mobile Phone    Sonya Keyes (Mother) [phone] -- [phone]    JO CANELA (Spouse) [phone] -- [phone]              Insurance Information                  [facility]/ [facility] Phone: [phone]    Subscriber: [place] Subscriber#: [id number]    Group#: -- Precert#: --        [employer]HE[employer]UE CROSS/[employer]HE[employer] BLUE [employer] PPO Phone: [phone]    Subscriber: Rola Shweta Tri Subscriber#: [id number]    Group#: [id number] Precert#: --             History & Physical        High, Steve BOWMAN MD at 24          Hsweta Tri Canela  1995  [id number]  [id number]    CC: contractions, pelvic pressure  HPI:  Patient is 28 y.o. female   currently at 29w2d presents with c/o pelvic pressure, onset earlier today.  Contractions, onset ~1400, intermittent, rates 2/10, no assoc bleeding or leaking.  Good FM X 2.  No recent intercourse.  PNC comp by mono/di twins with hx  contractions/labor at  24 5/7 weeks, s/p  steroids.      PMH:  Current meds: PNV, procardia 10mg prn, baby ASA, amoxicillin (otitis currently)  Illnesses: heart murmer (no w/u), migraines, ear infections  Surgeries: ear tubes, oral surg  Allergies: Bactrim- flu-like symptoms    Past OB History:       OB History    Para Term  AB Living   1 0 0 0 0 0   SAB IAB Ectopic Molar Multiple Live Births   0 0 0 0 0 0      # Outcome Date GA Lbr Khadar/2nd Weight Sex Delivery Anes PTL Lv   1 Current               Obstetric Comments   FOB #1 Pregnancy #1  current            SH: tob neg , EtOH neg, drugs neg    General ROS: contractions, D (X4, last one at 1400), edema.   All other systems reviewed and are negative.      Physical Examination: General appearance - alert, well appearing, and in no distress  Vital signs - There were no vitals taken for this visit.  HEENT: normocephalic,  atraumatic,oropharynx clear, appearance of ears and nose normal  Neck - supple, no significant adenopathy, no thyromegaly  Lymphatics - no palpable lymphadenopathy in the neck or groin, no hepatosplenomegaly  Chest - clear to auscultation, no wheezes, rales or rhonchi, respiratory effort non-labored  Heart - normal rate, regular rhythm, no murmurs, rubs, clicks or gallops, no JVD, no lower extremity edema  Abdomen - soft, nontender, nondistended, no masses, no hepatosplenomegaly  no rebound tenderness noted, bowel sounds normal  Vaginal Exam: 1-2/80%/ballot, no blood in vault,external genitalia normal  Extremities - no pedal edema noted, no calf tend  Skin -warm and dry, normal coloration and turgor, no rashes, no suspicious skin lesions noted      Fetal monitoring: indication contractions, mono/di twins , onset  , offset  , baseline 135(A),140(B) , mod BTB variability(A and B) , multiple accels (15 X 15)(A and B), no decels(A or B), occas contractions, interpretation reactive NST (A and B)    Radiology     Assessment 1)IUP 29 2/7 weeks   2) labor   3)mono/di twins   4)hx  labor this preg, s/p steroids at 24 5/7 weeks    Plan 1)admit   2)magnesium   3)rescue steroids   4)PCN    Steve Thomas MD  2024  21:07 EST                                                                       Electronically signed by Steve Thomas MD at 24 2116       Vital Signs (last day)       Date/Time Temp Temp src Pulse Resp BP Patient Position SpO2    24 1336 -- -- 84 16 102/68 -- --    24 1335 -- -- 93 -- -- -- 95    24 1242 98 (36.7) Axillary 88 14 126/72 -- --    24 1140 -- -- 93 16 115/77 -- --    24 1139 -- -- 91 -- -- -- 98    24 1033 -- -- 85 16 112/72 Sitting --    24 1029 -- -- 91 -- -- -- 100    24 0913 -- -- 88 14 109/71 Lying 98    24 0759 -- -- 84 -- -- -- 98    24 0747 97.9 (36.6) Oral 86 16 118/70 Sitting 99    24 0655 -- --  93 -- -- -- 100    01/02/24 0650 -- -- 88 16 118/74 Sitting 100    01/02/24 0600 -- -- 87 16 111/72 -- 98    01/02/24 0515 -- -- 89 14 112/72 -- 98    01/02/24 0415 97.7 (36.5) Oral 86 14 98/57 Lying 99    01/02/24 0310 -- -- 85 16 108/58 -- 98    01/02/24 0215 -- -- 76 16 99/55 -- 98    01/02/24 0115 -- -- 84 16 103/57 -- 98    01/02/24 0010 97.8 (36.6) Oral 91 16 111/73 Lying 98    01/01/24 2305 -- -- 92 14 108/71 -- 98    01/01/24 2215 -- -- 91 16 107/70 -- 98    01/01/24 2150 -- -- 102 16 111/74 Sitting 99    01/01/24 2145 -- -- 100 16 108/69 Sitting 98    01/01/24 2143 -- -- 98 -- -- -- 98    01/01/24 2139 -- -- 101 16 112/73 Sitting --    01/01/24 2138 -- -- 102 -- -- -- --    01/01/24 2135 98 (36.7) Oral 98 16 103/69 Sitting 98    01/01/24 2133 -- -- 95 -- -- -- --    01/01/24 2128 98 (36.7) Oral 98 16 100/61 Sitting --          Facility-Administered Medications as of 1/2/2024   Medication Dose Route Frequency Provider Last Rate Last Admin    acetaminophen (TYLENOL) tablet 1,000 mg  1,000 mg Oral Q4H PRN Steve Thomas MD        aspirin chewable tablet 81 mg  81 mg Oral Nightly HighSteve MD   81 mg at 01/02/24 0010    betamethasone acetate-betamethasone sodium phosphate (CELESTONE SOLUSPAN) injection 12 mg  12 mg Intramuscular Q24H Steve Thomas MD   12 mg at 01/01/24 2134    bisacodyl (DULCOLAX) suppository 10 mg  10 mg Rectal Daily PRN Steve Thomas MD        dextrose 5 % and sodium chloride 0.2 % infusion  100 mL/hr Intravenous Continuous High, Steve BOWMAN  mL/hr at 01/02/24 0653 100 mL/hr at 01/02/24 0653    docusate sodium (COLACE) capsule 100 mg  100 mg Oral BID PRN HighSteve MD        famotidine (PEPCID) tablet 20 mg  20 mg Oral BID AC HighSteve MD   20 mg at 01/02/24 0515    lidocaine PF 1% (XYLOCAINE) injection 0.5 mL  0.5 mL Intradermal Once PRN Steve Thomas MD        magnesium sulfate 20 GM/500ML infusion  2 g/hr Intravenous Continuous HighSteve MD 50 mL/hr at  01/02/24 1336 2 g/hr at 01/02/24 1336    [COMPLETED] magnesium sulfate bolus from bag 0.04 g/mL solution 4 g  4 g Intravenous Once High, Steve BOWMAN MD   4 g at 01/01/24 2131    ofloxacin (OCUFLOX) 0.3 % ophthalmic solution 2 drop  2 drop Both Eyes 4x Daily Comfort Amos MD        ondansetron (ZOFRAN) tablet 8 mg  8 mg Oral Q8H PRN High, Steve BOWMAN MD        Or    ondansetron (ZOFRAN) injection 4 mg  4 mg Intravenous Q8H PRN High, Steve BOWMAN MD        [COMPLETED] penicillin G potassium 5 Million Units in sodium chloride 0.9 % 100 mL IVPB  5 Million Units Intravenous Once High, Steve BOWMAN MD   5 Million Units at 01/01/24 2116    Followed by    penicillin G in iso-osmotic dextrose IVPB 3 million units (premix)  3 Million Units Intravenous Q4H High, Steve BOWMAN MD   3 Million Units at 01/02/24 1240    prednisoLONE acetate (PRED FORTE) 1 % ophthalmic suspension 2 drop  2 drop Both Eyes Q12H Comfort Amos MD   2 drop at 01/02/24 1417    prenatal vitamin tablet 1 tablet  1 tablet Oral Nightly High, Steve BOWMAN MD   1 tablet at 01/02/24 0010    pseudoephedrine (SUDAFED) tablet 30 mg  30 mg Oral Q4H PRN Comfort Amos MD        sodium chloride 0.9 % flush 10 mL  10 mL Intravenous Q12H HighSteve MD        sodium chloride 0.9 % flush 10 mL  10 mL Intravenous PRN HighSteve MD        sodium chloride 0.9 % infusion 40 mL  40 mL Intravenous PRN High, Steve BOWMAN MD         Lab Results (last 24 hours)       Procedure Component Value Units Date/Time    Preeclampsia Panel [[id number]]  (Abnormal) Collected: 01/01/24 2109    Specimen: Blood Updated: 01/01/24 2213     Alkaline Phosphatase 90 U/L      ALT (SGPT) 10 U/L      AST (SGOT) 16 U/L      Creatinine 0.55 mg/dL      Total Bilirubin 0.2 mg/dL       U/L      Comment: Specimen hemolyzed.  Results may be affected.        Uric Acid 5.0 mg/dL     CBC (No Diff) [[id number]]  (Abnormal) Collected: 01/01/24 2109    Specimen: Blood Updated: 01/01/24 2130     WBC 12.10 10*3/mm3       RBC 3.44 10*6/mm3      Hemoglobin 11.6 g/dL      Hematocrit 32.3 %      MCV 93.9 fL      MCH 33.7 pg      MCHC 35.9 g/dL      RDW 12.5 %      RDW-SD 42.7 fl      MPV 10.2 fL      Platelets 132 10*3/mm3     Urinalysis With Microscopic If Indicated (No Culture) - Urine, Clean Catch [[id number]]  (Abnormal) Collected: 24    Specimen: Urine, Clean Catch Updated: 24     Color, UA Yellow     Appearance, UA Clear     pH, UA 6.5     Specific Gravity, UA <=1.005     Glucose, UA Negative     Ketones, UA Trace     Bilirubin, UA Negative     Blood, UA Negative     Protein, UA Negative     Leuk Esterase, UA Negative     Nitrite, UA Negative     Urobilinogen, UA 0.2 E.U./dL    Narrative:      Urine microscopic not indicated.          Imaging Results (Last 24 Hours)       Procedure Component Value Units Date/Time    [facility] [[id number]] Collected: 24     Updated: 24    Narrative:      PAT NAME: ARMANDO DIETZ  MED REC#: [id number]  BIRTH DA: [id number]  PAT GEND: F  ACCOUNT#: [id number]  PAT TYPE: I  EXAM YEIMI: [id number]  REF PHYS INDIRA RODAS  ACCESSION [id number]      Comparison Studies  =================    The findings of this study are compared to the prior ultrasound study dated 23      Patient Status  ============    Inpatient-Portable      Indication  ========    Mo/Di Twins, short cervix      Maternal Assessment  ==================    Height 154 cm  Height (ft) 5 ft  Height (in) 1 in      Method  =======    Transabdominal ultrasound examination. View: Adequate view      Pregnancy  =========    Twin pregnancy. Monochorionic-diamniotic. Number of fetuses: 2      Dating  ======    LMP on: 6/10/2023  GA by LMP 29 w + 3 d  HUA by LMP: 3/16/2024  GA by prior assessment 29 w + 3 d  HUA by prior assessment: 3/16/2024  Ultrasound examination on: 2024  GA by U/S based upon: AC, BPD, Femur, HC  GA by U/S 30 w + 4 d  HUA by U/S: 3/8/2024  GA by  U/S based upon (Fetus 2): AC, BPD, Femur, HC  GA by U/S (Fetus 2) 29 w + 3 d  HUA by U/S (Fetus 2): 3/16/2024  Method of dating: Restore dating from previous exam  Previous dating: based on stated HUA, selected on 12/11/2023  Agreed HUA of previous dating: 3/16/2024  Assigned: based on stated HUA, selected on 12/11/2023  Assigned GA 29 w + 3 d  Assigned HUA: 3/16/2024  Pregnancy length 280 d      Fetal Biometry  ============    Standard  BPD 77.3 mm  31w 0d        84%        Hadlock    .0 mm  32w 5d        99%        Payton    .4 mm  31w 2d        73%        Hadlock    Cerebellum tr 37.9 mm  31w 0d        90%        Hill    .9 mm  30w 4d        78%        Hadlock    Femur 56.0 mm  29w 3d        36%        Hadlock    HC / AC 1.08    EFW 1,556 g          65%        Angel    EFW discordance 9.2 %  EFW (lb) 3 lb  EFW (oz) 7 oz  EFW by: Hadlock (BPD-HC-AC-FL)  Extended  Cav. septi pel. tr 8.0 mm  CM 6.2 mm          28%        Nicolaides    Head / Face / Neck  Cephalic index 0.77          23%        Nicolaides    Extremities / Bony Struc  FL / BPD 0.72    FL / HC 0.20    FL / AC 0.21    Other Structures   bpm      Fetal Biometry  ============    Standard  BPD 72.7 mm  29w 1d        30%        Hadlock    OFD 94.7 mm  30w 4d        79%        Payton    .7 mm  29w 4d        19%        Hadlock    Cerebellum tr 44.3 mm  34w 2d        >99%        Hill    .7 mm  30w 3d        73%        Hadlock    Femur 53.3 mm  28w 2d        10%        Hadlock    HC / AC 1.03    EFW 1,413 g          53%        Angel    EFW discordance 9.2 %  EFW (lb) 3 lb  EFW (oz) 2 oz  EFW by: Hadlock (BPD-HC-AC-FL)  Extended  CM 7.7 mm          73%        Nicolaides    Head / Face / Neck  Cephalic index 0.77          25%        Nicolaides    Extremities / Bony Struc  FL / BPD 0.73    FL / HC 0.20    FL / AC 0.20    Other Structures   bpm      General Evaluation  ================    Cardiac activity present.   bpm.  Fetal movements present.  Presentation cephalic, maternal right-NOT presenting.  Placenta posterior, right lateral.  Umbilical cord 3 vessel cord.  Amniotic fluid Amount of AF: normal. MVP 7.1 cm.      General Evaluation  ================    Cardiac activity present.  bpm.  Fetal movements present.  Presentation breech, maternal left-PRESENTING.  Placenta posterior.  Umbilical cord 3 vessel cord.  Amniotic fluid Amount of AF: normal. MVP 7.4 cm.      Fetal Anatomy  ============    Cranium: Normal  Cavum septi pellucidi: Normal  Cerebellum: Normal  Cisterna magna: Normal  Head / Neck  Rt lateral ventricle: Normal  Lt lateral ventricle: Normal  Lips: Normal  Profile: Normal  Nose: Normal  4-chamber view: Appears normal  RVOT view: Appears normal  LVOT view: Appears normal  Heart / Thorax  3-vessel view: Appears normal  3-vessel-trachea view: Appears normal  Cord insertion: Normal  Stomach: Appears normal  Kidneys: Appears normal  Bladder: Appears normal  Gender: male  Wants to know gender: yes      Fetal Anatomy  ============    Cranium: Normal  Cavum septi pellucidi: Normal  Cerebellum: Normal  Cisterna magna: Normal  Head / Neck  Rt lateral ventricle: Normal  Lt lateral ventricle: Normal  Lips: Normal  Profile: Normal  Nose: Normal  4-chamber view: Appears normal  RVOT view: Appears normal  LVOT view: Appears normal  Heart / Thorax  3-vessel view: Appears normal  3-vessel-trachea view: Appears normal  Cord insertion: Appears normal  Stomach: Appears normal  Kidneys: Appears normal  Bladder: Appears normal  Gender: male  Wants to know gender: yes      Fetal Doppler  ===========    Arterial  Umbilical A PI 1.19          86%        Norman    Umbilical A RI 0.71          81%        Norman    Umbilical A PS -37.69 cm/s    Umbilical A ED -11.01 cm/s  Umbilical A TAmax -22.50 cm/s    Umbilical A MD -10.78 cm/s  Umbilical A S / D 3.42          75%        Norman    Umbilical A  bpm      Fetal  Doppler  ===========    Arterial  Umbilical A PI 1.02          60%        Norman    Umbilical A RI 0.66          58%        Norman    Umbilical A PS 45.08 cm/s          52%        Ebbing    Umbilical A ED 15.29 cm/s  Umbilical A TAmax 29.31 cm/s          50%        Ebbing    Umbilical A MD 15.10 cm/s  Umbilical A S / D 2.95          53%        Norman    Umbilical A  bpm      Impression  =========    Size consistent with dates both twins.    No fetal anomalies were identified.    Amniotic fluid volume is normal for both twins.    Umbilical artery S/D ratio is normal for both twins.      Recommendation  ===============    Continue in hospital management.      Coding  ======    Description: 22898-91 Follow Up Ultrasound  Description: [id number] Follow up Ultrasound additional fetus  Description: [id number] Doppler Umbilical Artery  Description: [id number] Doppler Umbilical Artery add'l gestation        Sonographer: Nella Gardner RDMS  Physician: Doug Milligan, MD, FACOG    Electronically signed by: Doug Milligan, MD, FACOG at:  08:57          Orders (last 24 hrs)        Start     Ordered    24 1300  ofloxacin (OCUFLOX) 0.3 % ophthalmic solution 2 drop  4 Times Daily         24 1200    24 1300  prednisoLONE acetate (PRED FORTE) 1 % ophthalmic suspension 2 drop  Every 12 Hours Scheduled         24 1200    24 1245  ciprofloxacin-dexAMETHasone (CIPRODEX) 0.3-0.1 % otic suspension 4 drop  2 Times Daily,   Status:  Discontinued         24 1148    24 1035  DIET MESSAGE Quesadilla with chicken and cheese with a fruit cup  Once        Comments: Quesadilla with chicken and cheese with a fruit cup    24 1035    24 0746  [facility]  1 Time Imaging         24 0745    24 0743  pseudoephedrine (SUDAFED) tablet 30 mg  Every 4 Hours PRN         24 0743    24 0730  famotidine (PEPCID) tablet 20 mg  2 Times Daily  Before Meals         01/01/24 2042 01/02/24 0100  penicillin G in iso-osmotic dextrose IVPB 3 million units (premix)  Every 4 Hours        See Hyperspace for full Linked Orders Report.    01/01/24 2042 01/02/24 0030  prenatal vitamin tablet 1 tablet  Nightly         01/01/24 2341 01/02/24 0030  aspirin chewable tablet 81 mg  Nightly         01/01/24 2341 01/02/24 0000  Vital Signs q 4 while awake  Every 4 Hours      Comments: While the patient is awake.    01/01/24 2042 01/01/24 2130  sodium chloride 0.9 % flush 10 mL  Every 12 Hours Scheduled         01/01/24 2042 01/01/24 2130  dextrose 5 % and sodium chloride 0.2 % infusion  Continuous         01/01/24 2042 01/01/24 2130  penicillin G potassium 5 Million Units in sodium chloride 0.9 % 100 mL IVPB  Once        See Hyperspace for full Linked Orders Report.    01/01/24 2042 01/01/24 2130  magnesium sulfate bolus from bag 0.04 g/mL solution 4 g  Once         01/01/24 2042 01/01/24 2130  magnesium sulfate 20 GM/500ML infusion  Continuous         01/01/24 2042 01/01/24 2100  betamethasone acetate-betamethasone sodium phosphate (CELESTONE SOLUSPAN) injection 12 mg  Every 24 Hours         01/01/24 2042 01/01/24 2041  Diet: Regular/House Diet; Texture: Regular Texture (IDDSI 7); Fluid Consistency: Thin (IDDSI 0)  Diet Effective Now         01/01/24 2042 01/01/24 2041  Preeclampsia Panel  Once         01/01/24 2042 01/01/24 2040  Admit To Obstetrics Inpatient  Once         01/01/24 2042 01/01/24 2040  Code Status and Medical Interventions:  Continuous         01/01/24 2042 01/01/24 2040  Place Sequential Compression Device  Once         01/01/24 2042 01/01/24 2040  Maintain Sequential Compression Device  Continuous         01/01/24 2042 01/01/24 2040  Vital Signs Per hospital policy  Per Hospital Policy         01/01/24 2042 01/01/24 2040  Intermittent Auscultation FOR LOW RISK PATIENTS - NST on Admission Along With  Intermittent Auscultation of Fetal Heart Tones.  Per Order Details        Comments: Intermittent Auscultation FOR LOW RISK PATIENTS - NST on Admission Along With Intermittent Auscultation of Fetal Heart Tones.    24  NST Low risk >24 weeks:  Monitor for 30 minutes BID (Antepartum)  Once        Comments: Fetal monitoring/NST:  Antepartum >24 weeks monitor fetus 30 minutes twice daily    24  Antepartum Patients  <24 Weeks - Document Fetal Heart Tones Daily and PRN.  Per Order Details        Comments: For Antepartum Patients Less Than 24 Weeks - Document Fetal Heart Tones Daily & PRN.    24  Notify Physician (specified)  Until Discontinued         24  Notify physician for hyperstimulus (per hospital algorithm)  Until Discontinued         24  Notify physician if membranes ruptured, bleeding greater than 1 pad an hour, fetal heart tone abnormality, and severe pain  Until Discontinued         24  Up Ad Taisha  Until Discontinued         24  Initiate Group Beta Strep (GBS) Prophylaxis Protocol, If Criteria Met  Continuous        Comments: NO TREATMENT RECOMMENDED IF: 1) Maternal GBS Status Known Negative 2) Scheduled  Birth With Intact Membranes, Not in Labor 3) Maternal GBS Status Unknown, No Risk Factors  TREAT WITH ANTIBIOTICS IF:  1) Maternal GBS Status Known Positive 2) Maternal GBS Status Unknown With Risk Factors: a)  Previous Infant Affected By GBS Infection b) GBS Urinary Tract Infection (UTI) or Bacteriuria During Pregnancy c) Unexplained Maternal Fever (100.4F (38C) or Greater) During Labor d)  Prolonged Rupture of Membranes (18 or More Hours) e) Gestational Age Less Than 37 Weeks    24  CBC (No Diff)  Once         24  Type & Screen  Once         24     24  Insert Peripheral IV  Once         24  Saline Lock & Maintain IV Access  Continuous         24  sodium chloride 0.9 % flush 10 mL  As Needed         24  sodium chloride 0.9 % infusion 40 mL  As Needed         24  lidocaine PF 1% (XYLOCAINE) injection 0.5 mL  Once As Needed         24  acetaminophen (TYLENOL) tablet 1,000 mg  Every 4 Hours PRN         24  ondansetron (ZOFRAN) tablet 8 mg  Every 8 Hours PRN        See Hyperspace for full Linked Orders Report.    24  ondansetron (ZOFRAN) injection 4 mg  Every 8 Hours PRN        See Hyperspace for full Linked Orders Report.    24  docusate sodium (COLACE) capsule 100 mg  2 Times Daily PRN         24  bisacodyl (DULCOLAX) suppository 10 mg  Daily PRN         24  Urinalysis With Microscopic If Indicated (No Culture) - Urine, Clean Catch  Once         24    Unscheduled  Position Change - For Intra-Uterine Resusitation for Hypertonus, HyperStimulation or Non-Reassuring Fetal Status  As Needed       24                     Physician Progress Notes (last 24 hours)        Comfort Amos MD at 24 0810          Maternal-Fetal Medicine   Progress Note    Patient name: Shweta Canela  YOB: 1995   MRN: [id number]  Admission Date: 2024  Date of Service: 2024  Referring Provider: Selina Curtis       Shweta Canela is a 28 y.o.    at 29w3d  admitted on 2024 for  labor in third trimester without delivery    Hospital day 1    Chief Complaint   Patient presents with    pressure    Contractions       Diagnoses:      labor in third trimester without delivery       Subjective:      Shweta has no complaints  today.   Patient denies  headache:   Patient denies  right upper quadrant pain:   Reports fetal movement is normal  Denies leakage of amniotic fluid.  Denies vaginal bleeding    Objective:     Vital signs:  Temp:  [97.7 °F (36.5 °C)-98 °F (36.7 °C)] 97.7 °F (36.5 °C)  Heart Rate:  [] 93  Resp:  [14-16] 16  BP: ()/(55-74) 118/74    Abdomen: soft, nontender  Uterus: gravid, nontender  Extremities: nontender; no edema     Fetal heart rate tracing review  FHT's: Category 1  TOCO: irregular    Cervix: last check      Most recent ultrasound:  2024    Medications:  aspirin, 81 mg, Oral, Nightly  betamethasone acetate-betamethasone sodium phosphate, 12 mg, Intramuscular, Q24H  famotidine, 20 mg, Oral, BID AC  penicillin g (potassium), 3 Million Units, Intravenous, Q4H  prenatal vitamin, 1 tablet, Oral, Nightly  sodium chloride, 10 mL, Intravenous, Q12H         acetaminophen    bisacodyl    docusate sodium    lidocaine PF 1%    ondansetron **OR** ondansetron    pseudoephedrine    sodium chloride    sodium chloride    Labs:  Lab Results   Component Value Date    HGB 11.6 (L) 2024     Lab Results   Component Value Date    GLUCOSE 79 2023         Assessment/Plan:      Shweta is a 28 y.o.    at 29w3d.  1.   Active Hospital Problems    Diagnosis     ** labor in third trimester without delivery       2.  Twin gestation  4.  Possible DEEJAY.    Plan:   1.  Patient will receive ultrasound today has not had no contractions.  2.  Patient has right ear pain.  Otoscopic examination revealed a ear tube present normal eardrum no inflammation seen.  Patient is already on penicillin and has a history of having ear tubes which are supposed to fall out spontaneously.  I think this is just congestion and we will add Sudafed.  3.  Status post magnesium sulfate and steroids.  4.  If stable later once steroid window is may be able to go home in the next day or so.    I spent 15 minutes with this patient of  which greater than 50% was face to face counseling and coordination of care.  All questions were answered to the best of my ablity.    Comfort Amos MD  1/2/2024  08:10 EST      Electronically signed by Comfort Amos MD at 01/02/24 0812       Consult Notes (last 24 hours)  Notes from 01/01/24 1437 through 01/02/24 1437   No notes of this type exist for this encounter.       FHR (last day)       Date/Time Fetal HR Assessment Method Fetal HR (beats/min) Fetal HR Baseline Fetal HR Variability Fetal HR Accelerations Fetal HR Decelerations Fetal HR Tracing Category    01/02/24 0815 external 120 normal range moderate (amplitude range 6 to 25 bpm) greater than/equal to 10 bpm (32 wks gest or less);lasts at least 10 seconds (32 wks gest or less) absent --    01/01/24 2100 external 140 normal range moderate (amplitude range 6 to 25 bpm) greater than/equal to 15 bpm;lasting at least 15 seconds absent --    01/01/24 2030 external 140 normal range moderate (amplitude range 6 to 25 bpm) greater than/equal to 15 bpm;lasting at least 15 seconds absent --    01/01/24 2015 external 140 normal range moderate (amplitude range 6 to 25 bpm) greater than/equal to 15 bpm;lasting at least 15 seconds absent --    01/01/24 2000 external 145 normal range moderate (amplitude range 6 to 25 bpm) absent absent --    01/01/24 1945 external 140 normal range moderate (amplitude range 6 to 25 bpm) greater than/equal to 15 bpm;lasting at least 15 seconds absent --    01/01/24 1930 external 145 normal range moderate (amplitude range 6 to 25 bpm) absent absent --          Uterine Activity (last day)       Date/Time Method Contraction Frequency (Minutes) Contraction Duration (sec) Contraction Intensity Uterine Resting Tone Contraction Pattern    01/02/24 0800 -- 3 -- -- -- --    01/02/24 0700 external tocotransducer x4  -- -- --    01/02/24 0600 external tocotransducer x5  -- -- --    01/02/24 0500 external tocotransducer x4 100-160 -- --  --    01/02/24 0400 external tocotransducer x4 100-120 -- -- --    01/02/24 0300 external tocotransducer x5  -- -- --    01/02/24 0200 external tocotransducer x3  -- -- --    01/02/24 0100 external tocotransducer x3  -- -- --    01/02/24 0000 external tocotransducer -- 100-140 -- -- --    01/01/24 2305 external tocotransducer poor tracing -- -- soft by palpation --    01/01/24 2200 external tocotransducer -- 100 -- -- --    01/01/24 2130 external tocotransducer -- 60-90 -- -- --    01/01/24 2100 external tocotransducer -- 70-80 -- -- --    01/01/24 2030 external tocotransducer -- 60-90 -- -- --    01/01/24 2015 external tocotransducer --  -- -- --    01/01/24 2000 external tocotransducer 5-6 40-80 -- -- --    01/01/24 1945 external tocotransducer -- 50-70 -- -- --    01/01/24 1930 external tocotransducer -- -- no contractions -- --

## 2024-01-02 NOTE — H&P
Shweta Canela  1995  [id number]  [id number]    CC: contractions, pelvic pressure  HPI:  Patient is 28 y.o. female   currently at 29w2d presents with c/o pelvic pressure, onset earlier today.  Contractions, onset ~1400, intermittent, rates 2/10, no assoc bleeding or leaking.  Good FM X 2.  No recent intercourse.  PNC comp by mono/di twins with hx  contractions/labor at  24 5/7 weeks, s/p  steroids.      PMH:  Current meds: PNV, procardia 10mg prn, baby ASA, amoxicillin (otitis currently)  Illnesses: heart murmer (no w/u), migraines, ear infections  Surgeries: ear tubes, oral surg  Allergies: Bactrim- flu-like symptoms    Past OB History:       OB History    Para Term  AB Living   1 0 0 0 0 0   SAB IAB Ectopic Molar Multiple Live Births   0 0 0 0 0 0      # Outcome Date GA Lbr Khadar/2nd Weight Sex Delivery Anes PTL Lv   1 Current               Obstetric Comments   FOB #1 Pregnancy #1  current            SH: tob neg , EtOH neg, drugs neg    General ROS: contractions, D (X4, last one at 1400), edema.   All other systems reviewed and are negative.      Physical Examination: General appearance - alert, well appearing, and in no distress  Vital signs - There were no vitals taken for this visit.  HEENT: normocephalic, atraumatic,oropharynx clear, appearance of ears and nose normal  Neck - supple, no significant adenopathy, no thyromegaly  Lymphatics - no palpable lymphadenopathy in the neck or groin, no hepatosplenomegaly  Chest - clear to auscultation, no wheezes, rales or rhonchi, respiratory effort non-labored  Heart - normal rate, regular rhythm, no murmurs, rubs, clicks or gallops, no JVD, no lower extremity edema  Abdomen - soft, nontender, nondistended, no masses, no hepatosplenomegaly  no rebound tenderness noted, bowel sounds normal  Vaginal Exam: 1-2/80%/ballot, no blood in vault,external genitalia normal  Extremities - no pedal edema noted, no calf tend  Skin -warm  and dry, normal coloration and turgor, no rashes, no suspicious skin lesions noted      Fetal monitoring: indication contractions, mono/di twins , onset  , offset  , baseline 135(A),140(B) , mod BTB variability(A and B) , multiple accels (15 X 15)(A and B), no decels(A or B), occas contractions, interpretation reactive NST (A and B)    Radiology     Assessment 1)IUP 29 2/7 weeks   2) labor   3)mono/di twins   4)hx  labor this preg, s/p steroids at 24 5/7 weeks    Plan 1)admit   2)magnesium   3)rescue steroids   4)PCN    Steve Thomas MD  2024  21:07 EST

## 2024-01-03 PROCEDURE — 99232 SBSQ HOSP IP/OBS MODERATE 35: CPT | Performed by: OBSTETRICS & GYNECOLOGY

## 2024-01-03 PROCEDURE — 25010000002 PENICILLIN G POTASSIUM PER 600000 UNITS: Performed by: OBSTETRICS & GYNECOLOGY

## 2024-01-03 PROCEDURE — 59025 FETAL NON-STRESS TEST: CPT | Performed by: OBSTETRICS & GYNECOLOGY

## 2024-01-03 PROCEDURE — 25010000002 MAGNESIUM SULFATE 20 GM/500ML SOLUTION: Performed by: OBSTETRICS & GYNECOLOGY

## 2024-01-03 PROCEDURE — 59025 FETAL NON-STRESS TEST: CPT

## 2024-01-03 RX ORDER — PANTOPRAZOLE SODIUM 40 MG/1
40 TABLET, DELAYED RELEASE ORAL
Status: DISCONTINUED | OUTPATIENT
Start: 2024-01-03 | End: 2024-01-04 | Stop reason: HOSPADM

## 2024-01-03 RX ADMIN — ASPIRIN 81 MG: 81 TABLET, CHEWABLE ORAL at 22:31

## 2024-01-03 RX ADMIN — DEXTROSE AND SODIUM CHLORIDE 100 ML/HR: 5; 200 INJECTION, SOLUTION INTRAVENOUS at 05:02

## 2024-01-03 RX ADMIN — FAMOTIDINE 20 MG: 20 TABLET, FILM COATED ORAL at 08:30

## 2024-01-03 RX ADMIN — PENICILLIN G 3 MILLION UNITS: 3000000 INJECTION, SOLUTION INTRAVENOUS at 05:02

## 2024-01-03 RX ADMIN — PENICILLIN G 3 MILLION UNITS: 3000000 INJECTION, SOLUTION INTRAVENOUS at 08:35

## 2024-01-03 RX ADMIN — MAGNESIUM SULFATE IN WATER 2 G/HR: 20 INJECTION, SOLUTION INTRAVENOUS at 06:06

## 2024-01-03 RX ADMIN — PRENATAL VITAMINS-IRON FUMARATE 27 MG IRON-FOLIC ACID 0.8 MG TABLET 1 TABLET: at 22:31

## 2024-01-03 RX ADMIN — FAMOTIDINE 20 MG: 20 TABLET, FILM COATED ORAL at 18:00

## 2024-01-03 RX ADMIN — PENICILLIN G 3 MILLION UNITS: 3000000 INJECTION, SOLUTION INTRAVENOUS at 01:11

## 2024-01-03 RX ADMIN — PANTOPRAZOLE SODIUM 40 MG: 40 TABLET, DELAYED RELEASE ORAL at 15:17

## 2024-01-03 NOTE — PROGRESS NOTES
Daily Progress Note    Patient name: Shweta Canela  YOB: 1995   MRN: [id number]  Admission Date: 2024  Date of Service: 1/3/2024  Referring Provider: Selina Curtis MD    Shweta Canela is a 28 y.o.    at 29w4d  admitted on 2024 for  labor in third trimester without delivery    Hospital day 2      Diagnoses:   Patient Active Problem List    Diagnosis     * labor in third trimester without delivery [O60.03]     Cervical shortening affecting pregnancy [O26.879]     Monochorionic diamniotic twin gestation in first trimester [O30.031]     Migraines [G43.909]     Anxiety [F41.9]     Asthma [J45.909]     Chronic nonintractable headache [R51.9, G89.29]     Eye pain, left [H57.12]     Moderate persistent asthma without complication [J45.40]     Mood swings [R45.86]     Motion sickness [T75.3XXA]     Murmur [R01.1]     Non-recurrent acute suppurative otitis media of left ear without spontaneous rupture of tympanic membrane [H66.002]     Oral aphthous ulcer [K12.0]     Ulcer mouth [K12.1]     Serous otitis media with rupture of tympanic membrane [H65.90, H72.90]     Migraine, intractable [G43.919]     Depression, controlled [F32.A]     Encounter for Papanicolaou smear of cervix [Z12.4]     Contraceptive management [Z30.9]     Screening for STD (sexually transmitted disease) [Z11.3]        Chief Complaint:  Chief Complaint   Patient presents with    pressure    Contractions       Subjective:      Shweta has no complaints today.  Reports fetal movement is normal without bleeding.  Her contractions are not worse.    Objective:     Vital signs:  Temp:  [96.6 °F (35.9 °C)-98.2 °F (36.8 °C)] 96.6 °F (35.9 °C)  Heart Rate:  [] 93  Resp:  [14-16] 14  BP: ()/(54-78) 116/73    Abdomen: soft, nontender  Uterus: gravid, nontender  Extremities: nontender; no edema        NST NOTE    Indiction :   Twin A/ DEEJAY  FHR:          Reactive, Cat 1, No decels  Contractions:     Irregular  Time Monitored:   > 20 minutes     NST NOTE    Indiction :   Twin B/ DEEJAY  FHR:          Reactive, Cat 1, No decels  Contractions:    Irregular  Time Monitored:   > 20 minutes   Most recent ultrasound:  Yesterday    Medications:  aspirin, 81 mg, Oral, Nightly  famotidine, 20 mg, Oral, BID AC  ofloxacin, 2 drop, Both Eyes, 4x Daily  pantoprazole, 40 mg, Oral, Q AM  penicillin g (potassium), 3 Million Units, Intravenous, Q4H  prednisoLONE acetate, 2 drop, Both Eyes, Q12H  prenatal vitamin, 1 tablet, Oral, Nightly  sodium chloride, 10 mL, Intravenous, Q12H         acetaminophen    bisacodyl    docusate sodium    lidocaine PF 1%    ondansetron **OR** ondansetron    pseudoephedrine    simethicone    sodium chloride    sodium chloride    Labs:  Lab Results (last 24 hours)       ** No results found for the last 24 hours. **          Lab Results   Component Value Date    HGB 11.6 (L) 2024         Assessment/Plan:      Shweta is a 28 y.o.    at 29w4d.  1. Mono/ Di twins with  contractions and cervical dilation receiving rescue ANCS and magnesium.  Tolerating well.  2. Fetal status reassusring.  Continue NST/Growth scans q 3-4 weeks.  3. SCUDS for DVT prophylaxis.  4. Delivery plan - twin A currently Vertex.     .  All questions were answered to the best my ability.    Selina Curtis MD  1/3/2024

## 2024-01-04 VITALS
TEMPERATURE: 98.7 F | OXYGEN SATURATION: 98 % | SYSTOLIC BLOOD PRESSURE: 108 MMHG | DIASTOLIC BLOOD PRESSURE: 66 MMHG | HEART RATE: 82 BPM | RESPIRATION RATE: 16 BRPM

## 2024-01-04 RX ORDER — PANTOPRAZOLE SODIUM 20 MG/1
20 TABLET, DELAYED RELEASE ORAL DAILY
Qty: 30 TABLET | Refills: 3 | Status: SHIPPED | OUTPATIENT
Start: 2024-01-04 | End: 2025-01-03

## 2024-01-04 RX ADMIN — PANTOPRAZOLE SODIUM 40 MG: 40 TABLET, DELAYED RELEASE ORAL at 06:34

## 2024-01-11 ENCOUNTER — ROUTINE PRENATAL (OUTPATIENT)
Dept: OBSTETRICS AND GYNECOLOGY | Facility: CLINIC | Age: 29
End: 2024-01-11
Payer: OTHER GOVERNMENT

## 2024-01-11 VITALS — SYSTOLIC BLOOD PRESSURE: 118 MMHG | DIASTOLIC BLOOD PRESSURE: 76 MMHG | WEIGHT: 160.6 LBS | BODY MASS INDEX: 30.35 KG/M2

## 2024-01-11 DIAGNOSIS — O30.031 MONOCHORIONIC DIAMNIOTIC TWIN GESTATION IN FIRST TRIMESTER: ICD-10-CM

## 2024-01-11 DIAGNOSIS — O26.879 CERVICAL SHORTENING AFFECTING PREGNANCY: ICD-10-CM

## 2024-01-11 DIAGNOSIS — O28.8 NON-STRESS TEST NONREACTIVE: ICD-10-CM

## 2024-01-11 DIAGNOSIS — Z34.93 PRENATAL CARE IN THIRD TRIMESTER: Primary | ICD-10-CM

## 2024-01-11 LAB
GLUCOSE UR STRIP-MCNC: NEGATIVE MG/DL
PROT UR STRIP-MCNC: NEGATIVE MG/DL

## 2024-01-11 NOTE — DISCHARGE SUMMARY
Daily Progress Note    Patient name: Shweta Canela  YOB: 1995   MRN: [id number]  Admission Date: 2024  Date of Service: 2024  Referring Provider: Selina Curtis MD    Shweta Canela is a 28 y.o.    at 30w5d  admitted on 2024 for  labor in third trimester without delivery with mono/di twin pregnancy.    Hospital day 3      Diagnoses:   Patient Active Problem List    Diagnosis     * labor in third trimester without delivery [O60.03]     Cervical shortening affecting pregnancy [O26.879]     Monochorionic diamniotic twin gestation in first trimester [O30.031]     Migraines [G43.909]     Anxiety [F41.9]     Asthma [J45.909]     Chronic nonintractable headache [R51.9, G89.29]     Eye pain, left [H57.12]     Moderate persistent asthma without complication [J45.40]     Mood swings [R45.86]     Motion sickness [T75.3XXA]     Murmur [R01.1]     Non-recurrent acute suppurative otitis media of left ear without spontaneous rupture of tympanic membrane [H66.002]     Oral aphthous ulcer [K12.0]     Ulcer mouth [K12.1]     Serous otitis media with rupture of tympanic membrane [H65.90, H72.90]     Migraine, intractable [G43.919]     Depression, controlled [F32.A]     Encounter for Papanicolaou smear of cervix [Z12.4]     Contraceptive management [Z30.9]     Screening for STD (sexually transmitted disease) [Z11.3]        Chief Complaint:  Chief Complaint   Patient presents with    pressure    Contractions       Subjective:      Shweta has no complaints today.  Reports fetal movement is normal  Denies leakage of amniotic fluid.  Denies vaginal bleeding    Objective:     Vital signs:  BP: (118)/(76) 118/76    Abdomen: soft, nontender  Uterus: gravid, nontender  Extremities: nontender; no edema   Cervix - unchanged     NST NOTE TWIN A    Indiction :   Mono/Di twins,  contractions  FHR:          Reactive, Cat 1, No decels  Contractions:    Irregular  Time Monitored:    > 20 minutes     NST NOTE  TWIN B    Indiction :   Mono/Di,   contractions  FHR:          Reactive, Cat 1, No decels  Contractions:    Irregular  Time Monitored:   > 20 minutes            Labs:  Lab Results (last 24 hours)       ** No results found for the last 24 hours. **          Lab Results   Component Value Date    HGB 11.6 (L) 2024         Assessment/Plan:      Shweta is a 28 y.o.    at 30w5d.  1. Mono/Di twins  2.  contractions with high risk for  delivery.  Received Mag and ANCS and is now  stable for d/c home.  Cervix is unchanged.  3. Fetal: Both babies reactive.  Start biweekly NST  4  Continue reduced activity.    .  All questions were answered to the best my ability.    Selina Curtis MD  2024

## 2024-01-11 NOTE — PROGRESS NOTES
OB FOLLOW UP  CC- Here for care of pregnancy        Shweta Canela is a 28 y.o.  30w5d patient being seen today for her obstetrical follow up visit. Patient reports irregular contractions, swelling in both lower extremities (It is Pitting at times), low back pain (She denies dysuria), heartburn (She is taking OTC medication for treatment currently), and vaginal pressure/pain. Patient states that she is unsure what a contraction feels like. Patient reports having sharp stabbing pains that begin at the top of her stomach and radiate down her stomach as well as painful low pelvic cramping throughout the day. Patient is unsure how frequent this occurs within 1 hour or throughout the day. Patient placed on NST and had 1 contraction within the first 5 minutes. Patient informed that she had a contraction and reported that she did feel tightening in her abdomen. Patient informed that that is the feeling of a contraction. Patient then states that this occurs several times throughout the day. Patient states that she is often distracted throughout the day and does not time her contractions. Patient reports not taking Nifedipine for contractions. Patient states that she is now having mild constant vaginal pressure with intermittent sharp vaginal pains over the last 2-3 days.     Her prenatal care is complicated by (and status) :    Patient Active Problem List   Diagnosis    Monochorionic diamniotic twin gestation in first trimester    Migraines    Anxiety    Asthma    Chronic nonintractable headache    Eye pain, left    Contraceptive management    Encounter for Papanicolaou smear of cervix    Screening for STD (sexually transmitted disease)    Depression, controlled    Moderate persistent asthma without complication    Mood swings    Motion sickness    Murmur    Non-recurrent acute suppurative otitis media of left ear without spontaneous rupture of tympanic membrane    Oral aphthous ulcer    Ulcer mouth     Serous otitis media with rupture of tympanic membrane    Migraine, intractable    Cervical shortening affecting pregnancy     labor in third trimester without delivery       Flu Status: Already given in current flu season  TDAP status:   Rhogam status: was not indicated  28 week labs: Reviewed and normal  Ultrasound Today: Yes  Non Stress Test: Yes, 20 minutes   non-stress test:  ROS -   Patient Reports :  see above  Patient Denies: Loss of Fluid, Vaginal Spotting, Vision Changes, Headaches, Nausea , Vomiting , and Epigastric pain  Fetal Movement : normal  All other systems reviewed and are negative.       The additional following portions of the patient's history were reviewed and updated as appropriate: allergies and current medications.    I have reviewed and agree with the HPI, ROS, and historical information as entered above. Selina Curtis MD      /76   Wt 72.8 kg (160 lb 9.6 oz)   BMI 30.35 kg/m²         EXAM:     Prenatal Vitals  BP: 118/76  Weight: 72.8 kg (160 lb 9.6 oz)   Fetal Heart Rate: NST         NST NOTE    Indiction :   Twin A  FHR:          Reactive, Cat 1, No decels  Contractions:    Irregular  Time Monitored:   > 20 minutes     NST NOTE    Indiction :   Twin B  FHR:          Non Reactive  Contractions:    Irregular  Time Monitored:   > 20 minutes       Urine Glucose Read-only: Negative  Urine Protein Read-only: Negative      Cervix     Assessment and Plan    Problem List Items Addressed This Visit          Gravid and     Monochorionic diamniotic twin gestation in first trimester    Overview     PDC ultrasound every 4 weeks  23  US with Curtis every 4 weeks (2 weeks in between pdc)  23  Start NST 2x weekly @ 32 weeks         Cervical shortening affecting pregnancy    Overview     Restricted activity, no work as if 24 weeks          Other Visit Diagnoses       Prenatal care in third trimester    -  Primary    Relevant Orders    POC Urinalysis Dipstick  (Completed)    US Fetal Biophysical Profile;Without Non-Stress Testing (Completed)    Non-stress test nonreactive Twin B        BPP             Pregnancy at 30w5d  Rec TDAP and RSV vaccine  PDC scan one week  Fetal status reassuring.  28 week labs reviewed.    Activity and Exercise discussed.   contractions- No cervical change.  Continue pelvic rest and reduced activity.  Return in about 1 week (around 2024).    Selina Curtis MD  2024

## 2024-01-17 ENCOUNTER — HOSPITAL ENCOUNTER (OUTPATIENT)
Dept: WOMENS IMAGING | Facility: HOSPITAL | Age: 29
Discharge: HOME | End: 2024-01-17
Admitting: OBSTETRICS & GYNECOLOGY
Payer: OTHER GOVERNMENT

## 2024-01-17 ENCOUNTER — OFFICE VISIT (OUTPATIENT)
Dept: OBSTETRICS AND GYNECOLOGY | Facility: HOSPITAL | Age: 29
End: 2024-01-17
Payer: OTHER GOVERNMENT

## 2024-01-17 ENCOUNTER — ROUTINE PRENATAL (OUTPATIENT)
Dept: OBSTETRICS AND GYNECOLOGY | Facility: CLINIC | Age: 29
End: 2024-01-17
Payer: OTHER GOVERNMENT

## 2024-01-17 VITALS — BODY MASS INDEX: 30.42 KG/M2 | WEIGHT: 161 LBS | SYSTOLIC BLOOD PRESSURE: 114 MMHG | DIASTOLIC BLOOD PRESSURE: 82 MMHG

## 2024-01-17 VITALS — DIASTOLIC BLOOD PRESSURE: 82 MMHG | BODY MASS INDEX: 30.61 KG/M2 | WEIGHT: 162 LBS | SYSTOLIC BLOOD PRESSURE: 119 MMHG

## 2024-01-17 DIAGNOSIS — O30.031 MONOCHORIONIC DIAMNIOTIC TWIN GESTATION IN FIRST TRIMESTER: ICD-10-CM

## 2024-01-17 DIAGNOSIS — O30.031 MONOCHORIONIC DIAMNIOTIC TWIN GESTATION IN FIRST TRIMESTER: Primary | ICD-10-CM

## 2024-01-17 DIAGNOSIS — O60.03 PRETERM LABOR IN THIRD TRIMESTER WITHOUT DELIVERY: ICD-10-CM

## 2024-01-17 DIAGNOSIS — O26.879 CERVICAL SHORTENING AFFECTING PREGNANCY: ICD-10-CM

## 2024-01-17 DIAGNOSIS — Z34.93 PRENATAL CARE IN THIRD TRIMESTER: Primary | ICD-10-CM

## 2024-01-17 LAB
GLUCOSE UR STRIP-MCNC: NEGATIVE MG/DL
PROT UR STRIP-MCNC: NEGATIVE MG/DL

## 2024-01-17 PROCEDURE — 76816 OB US FOLLOW-UP PER FETUS: CPT

## 2024-01-17 PROCEDURE — 76819 FETAL BIOPHYS PROFIL W/O NST: CPT

## 2024-01-17 RX ORDER — CIPROFLOXACIN AND DEXAMETHASONE 3; 1 MG/ML; MG/ML
SUSPENSION/ DROPS AURICULAR (OTIC)
COMMUNITY
Start: 2024-01-09

## 2024-01-17 NOTE — PROGRESS NOTES
Pt has appt with Dr. Curtis today. NIPT negative. Pt denies vaginal bleeding, or loss of fluid. Pt reports sporadic contractions.

## 2024-01-17 NOTE — PROGRESS NOTES
OB FOLLOW UP  CC- Here for care of pregnancy        Shweta Canela is a 28 y.o.  31w4d patient being seen today for her obstetrical follow up visit. Patient reports swelling in both lower extremities. It is Non-Pitting. Patient reports a couple episodes of seeing spots. The last episode was over the weekend. Patient states that she checked her B/P at it was 110s/70s-80s. Patient reports fatigue and nausea yesterday. Patient reports increased vaginal & rectal pain and pressure since yesterday. Patient also reports irregular ctx.    Her prenatal care is complicated by (and status) :    Patient Active Problem List   Diagnosis    Monochorionic diamniotic twin gestation in first trimester    Migraines    Anxiety    Asthma    Chronic nonintractable headache    Eye pain, left    Contraceptive management    Encounter for Papanicolaou smear of cervix    Screening for STD (sexually transmitted disease)    Depression, controlled    Moderate persistent asthma without complication    Mood swings    Motion sickness    Murmur    Non-recurrent acute suppurative otitis media of left ear without spontaneous rupture of tympanic membrane    Oral aphthous ulcer    Ulcer mouth    Serous otitis media with rupture of tympanic membrane    Migraine, intractable    Cervical shortening affecting pregnancy     labor in third trimester without delivery       Flu Status: Already given in current flu season  TDAP status: given today  Rhogam status: was not indicated  28 week labs: Reviewed  Ultrasound Today: Yes, at PDC  Non Stress Test: No.      ROS -   Patient Reports :  see above  Patient Denies: Loss of Fluid, Vaginal Spotting, Headaches, Vomiting , and Epigastric pain  Fetal Movement : normal  All other systems reviewed and are negative.       The additional following portions of the patient's history were reviewed and updated as appropriate: allergies and current medications.    I have reviewed and agree with the  HPI, ROS, and historical information as entered above. Selina Curtis MD      /82   Wt 73 kg (161 lb)   BMI 30.42 kg/m²         EXAM:     Prenatal Vitals  BP: 114/82  Weight: 73 kg (161 lb)   Fetal Heart Rate: pos               Urine Glucose Read-only: Negative  Urine Protein Read-only: Negative           Assessment and Plan    Problem List Items Addressed This Visit          Gravid and     Monochorionic diamniotic twin gestation in first trimester    Overview     PDC ultrasound every 4 weeks  23  US with Curtis every 4 weeks (2 weeks in between pdc)  23  Start NST 2x weekly @ 32 weeks         Cervical shortening affecting pregnancy    Overview     Restricted activity, no work as if 24 weeks          labor in third trimester without delivery     Other Visit Diagnoses       Prenatal care in third trimester    -  Primary    Relevant Orders    POC Urinalysis Dipstick (Completed)    Tdap Vaccine Greater Than or Equal To 6yo IM            Pregnancy at 31w4d  Start biweekly NST  Fetal status reassuring.  28 week labs reviewed.    Activity and Exercise discussed.  Fetal movement/PTL or Labor precautions  Return start biweekly NST next week.    Selina Curtis MD  2024

## 2024-01-18 NOTE — ASSESSMENT & PLAN NOTE
Appropriate interval growth   Will follow up with MFM in 2 weeks for growth  Recommend  testing to start at 32 weeks GA

## 2024-01-18 NOTE — PROGRESS NOTES
Maternal/Fetal Medicine New Patient Note     Name: Shweta Canela    : 1995     MRN: [id number]     Referring Provider: CORRINE Solomon    Chief Complaint  Mono-Di twins, short cervix    Subjective     History of Present Illness:  Shweta Canela is a 28 y.o.  31w5d who presents today for follow up in the setting of Mo/Di twins. Also with short cervix. Recent admission for  contractions - cervix was 1-2cms per patient.   No current issues. No LOF, VB, CTX     HUA: Estimated Date of Delivery: 3/16/24     ROS:   As noted in HPI.     Past Medical History:   Diagnosis Date    Chronic paroxysmal hemicrania, not intractable     Concussion     IBS (irritable bowel syndrome)     Migraines       Past Surgical History:   Procedure Laterality Date    EAR TUBES      WISDOM TOOTH EXTRACTION        OB History          1    Para   0    Term   0       0    AB   0    Living   0         SAB   0    IAB   0    Ectopic   0    Molar   0    Multiple   0    Live Births   0          Obstetric Comments   FOB #1 Pregnancy #1  current               Current Outpatient Medications:     acetaminophen (TYLENOL) 325 MG tablet, Take  by mouth., Disp: , Rfl:     aluminum-magnesium hydroxide-simethicone (MAALOX/MYLANTA) 200-200-20 MG/5ML suspension, Take 5 mL by mouth Every 6 (Six) Hours As Needed for Indigestion or Heartburn. (Patient not taking: Reported on 2024), Disp: , Rfl:     aspirin 81 MG EC tablet, Take 1 tablet by mouth Daily., Disp: , Rfl:     calcium carbonate (TUMS) 500 MG chewable tablet, Chew 1 tablet As Needed for Indigestion or Heartburn. (Patient not taking: Reported on 2024), Disp: , Rfl:     ciprofloxacin-dexAMETHasone (CIPRODEX) 0.3-0.1 % otic suspension, INSTILL 5 DROPS INTO RIGHT EAR 2 TIMES PER DAY FOR 7 DAYS, Disp: , Rfl:     famotidine (Pepcid) 20 MG tablet, Take 1 tablet by mouth 2 (Two) Times a Day., Disp: 60 tablet, Rfl: 3    NIFEdipine (Procardia) 10 MG  "capsule, Take 1 capsule by mouth Every 4 (Four) to 6 (Six) Hours As Needed (Contractions). (Patient not taking: Reported on 2024), Disp: 30 capsule, Rfl: 1    pantoprazole (Protonix) 20 MG EC tablet, Take 1 tablet by mouth Daily., Disp: 30 tablet, Rfl: 3    prenatal vitamin (prenatal, CLASSIC, vitamin) tablet, Take  by mouth Daily., Disp: , Rfl:     Objective     Vital Signs  /82   Wt 73.5 kg (162 lb)   Estimated body mass index is 30.61 kg/m² as calculated from the following:    Height as of 23: 154.9 cm (61\").    Weight as of this encounter: 73.5 kg (162 lb).    Ultrasound Impression:   See viewpoint    Assessment and Plan     Diagnoses and all orders for this visit:    1. Monochorionic diamniotic twin gestation in first trimester (Primary)  Assessment & Plan:  Appropriate interval growth   Will follow up with MFM in 2 weeks for growth  Recommend  testing to start at 32 weeks GA            Follow Up  2 weeks     I spent 10 minutes caring for the patient on the day of service. This included: obtaining or reviewing a separately obtained medical history, reviewing patient records, performing a medically appropriate exam and/or evaluation, counseling or educating the patient/family/caregiver, ordering medications, labs, and/or procedures and documenting such in the medical record. This does not include time spent on review and interpretation of other tests such as fetal ultrasound or the performance of other procedures such as amniocentesis or CVS.    Meena Robles MD FACOG  Maternal Fetal Medicine, [facility]     2024  "

## 2024-01-23 ENCOUNTER — ROUTINE PRENATAL (OUTPATIENT)
Dept: OBSTETRICS AND GYNECOLOGY | Facility: CLINIC | Age: 29
End: 2024-01-23
Payer: OTHER GOVERNMENT

## 2024-01-23 VITALS — BODY MASS INDEX: 31.37 KG/M2 | WEIGHT: 166 LBS | DIASTOLIC BLOOD PRESSURE: 80 MMHG | SYSTOLIC BLOOD PRESSURE: 112 MMHG

## 2024-01-23 DIAGNOSIS — O60.03 PRETERM LABOR IN THIRD TRIMESTER WITHOUT DELIVERY: ICD-10-CM

## 2024-01-23 DIAGNOSIS — O30.031 MONOCHORIONIC DIAMNIOTIC TWIN GESTATION IN FIRST TRIMESTER: ICD-10-CM

## 2024-01-23 DIAGNOSIS — K64.4 EXTERNAL HEMORRHOIDS: ICD-10-CM

## 2024-01-23 DIAGNOSIS — O26.879 CERVICAL SHORTENING AFFECTING PREGNANCY: ICD-10-CM

## 2024-01-23 DIAGNOSIS — Z34.03 PRENATAL CARE, FIRST PREGNANCY, THIRD TRIMESTER: Primary | ICD-10-CM

## 2024-01-23 LAB
GLUCOSE UR STRIP-MCNC: NEGATIVE MG/DL
PROT UR STRIP-MCNC: NEGATIVE MG/DL

## 2024-01-23 PROCEDURE — 0502F SUBSEQUENT PRENATAL CARE: CPT | Performed by: NURSE PRACTITIONER

## 2024-01-23 RX ORDER — HYDROCORTISONE 25 MG/G
CREAM TOPICAL 2 TIMES DAILY
Qty: 28 G | Refills: 1 | Status: SHIPPED | OUTPATIENT
Start: 2024-01-23

## 2024-01-23 NOTE — PROGRESS NOTES
OB FOLLOW UP  CC- Here for care of pregnancy        Shweta Canela is a 28 y.o.  32w3d patient being seen today for her obstetrical follow up visit. Patient reports swelling in lower extremities and hands. Pt c/o hemorrhoids (Tucks pads and Prep H have not resolved). Also reports headaches, and intermittent contractions.     Her prenatal care is complicated by (and status) :    Patient Active Problem List   Diagnosis    Monochorionic diamniotic twin gestation in first trimester    Migraines    Anxiety    Asthma    Chronic nonintractable headache    Eye pain, left    Contraceptive management    Encounter for Papanicolaou smear of cervix    Screening for STD (sexually transmitted disease)    Depression, controlled    Moderate persistent asthma without complication    Mood swings    Motion sickness    Murmur    Non-recurrent acute suppurative otitis media of left ear without spontaneous rupture of tympanic membrane    Oral aphthous ulcer    Ulcer mouth    Serous otitis media with rupture of tympanic membrane    Migraine, intractable    Cervical shortening affecting pregnancy     labor in third trimester without delivery       Flu Status: Already given in current flu season  TDAP status: received at last visit  Rhogam status: was not indicated  28 week labs: Reviewed    Ultrasound Today: Yes  Non Stress Test: Yes minutes > 20 minutes  non-stress test: NST: Non-Reactive Difficult to trace both twins  indication:  Mono/Di Twins       ROS -   Patient Reports :  see above  Patient Denies: Loss of Fluid, Vaginal Spotting, Vision Changes, Nausea , Vomiting , and Epigastric pain  Fetal Movement : normal  All other systems reviewed and are negative.       The additional following portions of the patient's history were reviewed and updated as appropriate: allergies and current medications.    I have reviewed and agree with the HPI, ROS, and historical information as entered above. Nesha CHILDERS  Real, APRN      /80   Wt 75.3 kg (166 lb)   BMI 31.37 kg/m²       EXAM:     Prenatal Vitals  BP: 112/80  Weight: 75.3 kg (166 lb)   Fetal Heart Rate: +     Urine Glucose Read-only: Negative  Urine Protein Read-only: Negative       Assessment and Plan    Problem List Items Addressed This Visit       Monochorionic diamniotic twin gestation in first trimester    Overview     PDC ultrasound every 4 weeks  23  US with Curtis every 4 weeks (2 weeks in between pdc)  23  Start NST 2x weekly @ 32 weeks         Relevant Orders    US Fetal Biophysical Profile;With Non-Stress Testing    US fetal biophysical no stress ea add gest    Cervical shortening affecting pregnancy    Overview     Restricted activity, no work as if 24 weeks          labor in third trimester without delivery     Other Visit Diagnoses       Prenatal care, first pregnancy, third trimester    -  Primary    Relevant Orders    POC Urinalysis Dipstick (Completed)    External hemorrhoids        Relevant Medications    Hydrocortisone, Perianal, (ANUSOL-HC) 2.5 % rectal cream    benzocaine-menthol (DERMOPLAST) 20-0.5 % aerosol topical spray            Pregnancy at 32w3d  Fetal status reassuring. NST non-reactive. Difficult to trace both twins. BPP's 88.  28 week labs reviewed.    Activity and Exercise discussed.  Fetal movement/PTL or Labor precautions  Medication(s) Ordered  Patient is on Prenatal vitamins  Pt reports occasional contractions but not regular or strong. Continue pelvic rest and light activity.  Hemorrhoids: anusol and dermoplast spray Rx sent to pharmacy.  Return in about 3 days (around 2024) for NST.    Nesha Real, APRN  2024

## 2024-01-26 ENCOUNTER — ROUTINE PRENATAL (OUTPATIENT)
Dept: OBSTETRICS AND GYNECOLOGY | Facility: CLINIC | Age: 29
End: 2024-01-26
Payer: COMMERCIAL

## 2024-01-26 VITALS — SYSTOLIC BLOOD PRESSURE: 122 MMHG | BODY MASS INDEX: 32.16 KG/M2 | DIASTOLIC BLOOD PRESSURE: 86 MMHG | WEIGHT: 170.2 LBS

## 2024-01-26 DIAGNOSIS — Z34.93 PRENATAL CARE IN THIRD TRIMESTER: Primary | ICD-10-CM

## 2024-01-26 DIAGNOSIS — O30.033 MONOCHORIONIC DIAMNIOTIC TWIN GESTATION IN THIRD TRIMESTER: ICD-10-CM

## 2024-01-26 LAB
GLUCOSE UR STRIP-MCNC: NEGATIVE MG/DL
PROT UR STRIP-MCNC: NEGATIVE MG/DL

## 2024-01-26 PROCEDURE — 0502F SUBSEQUENT PRENATAL CARE: CPT | Performed by: OBSTETRICS & GYNECOLOGY

## 2024-01-26 NOTE — PROGRESS NOTES
OB FOLLOW UP  CC- Here for care of pregnancy        Shweta Canela is a 28 y.o.  32w6d patient being seen today for her obstetrical follow up visit. Patient reports headaches at night for the past 3 days that is resolved with rest. Patient reports that she randomly sees stars, but it is not associated with her headaches. Patient reports increased nausea and vaginal discharge over the past week. She states that she has had some cramping and irregular ctx that are more intense. She also reports that the RUQ pain has returned.    Her prenatal care is complicated by (and status) :    Patient Active Problem List   Diagnosis    Monochorionic diamniotic twin gestation in first trimester    Migraines    Anxiety    Asthma    Chronic nonintractable headache    Eye pain, left    Contraceptive management    Encounter for Papanicolaou smear of cervix    Screening for STD (sexually transmitted disease)    Depression, controlled    Moderate persistent asthma without complication    Mood swings    Motion sickness    Murmur    Non-recurrent acute suppurative otitis media of left ear without spontaneous rupture of tympanic membrane    Oral aphthous ulcer    Ulcer mouth    Serous otitis media with rupture of tympanic membrane    Migraine, intractable    Cervical shortening affecting pregnancy     labor in third trimester without delivery       Flu Status: Already given in current flu season  TDAP status: received at last visit  Rhogam status: was not indicated  28 week labs: Reviewed and Labs show anemia. She is not taking additional iron supplement.  Ultrasound Today: No  Non Stress Test: Yes minutes >20  non-stress test:  unable to trace , Fetus 1- BPP 8; Fetus 2-   indication: Multiple Gestation      ROS -   Patient Reports :  see above  Patient Denies: Loss of Fluid, Vaginal Spotting, Vomiting , and skin itching  Fetal Movement : normal  All other systems reviewed and are negative.       The  additional following portions of the patient's history were reviewed and updated as appropriate: allergies and current medications.    I have reviewed and agree with the HPI, ROS, and historical information as entered above. Selina Curtis MD      /86   Wt 77.2 kg (170 lb 3.2 oz)   BMI 32.16 kg/m²         EXAM:     Prenatal Vitals  BP: 122/86  Weight: 77.2 kg (170 lb 3.2 oz)   Fetal Heart Rate: NST       No change in cervix    NST NOTE    Indiction :   Mono Di twins.  FHR:          Reactive, Cat 1, No decels on one.  Contractions:    Irregular  Time Monitored:   > 20 minutes     NST NOTE    Indiction :   Mono Di twins  FHR:          Unable to monitor consistently  Contractions:    Irregular  Time Monitored:   > 20 minutes       Urine Glucose Read-only: Negative  Urine Protein Read-only: Negative           Assessment and Plan    Problem List Items Addressed This Visit    None  Visit Diagnoses       Prenatal care in third trimester    -  Primary    Relevant Orders    POC Urinalysis Dipstick (Completed)    Monochorionic diamniotic twin gestation in third trimester        Relevant Orders    US fetal biophysical no stress ea add gest            Pregnancy at 32w6d  Mono Di twins - BPP 8/8 on both twins.  Fetal status reassuring.  28 week labs reviewed.    Activity and Exercise discussed.  Fetal movement/PTL or Labor precautions  Tuesday with [facility] scan    Selina Curtis MD  01/26/2024

## 2024-01-29 ENCOUNTER — TELEPHONE (OUTPATIENT)
Dept: OBSTETRICS AND GYNECOLOGY | Facility: CLINIC | Age: 29
End: 2024-01-29
Payer: OTHER GOVERNMENT

## 2024-01-29 NOTE — TELEPHONE ENCOUNTER
Spoke with pt. She heard a popping sound over her right hip this morning, no pain, no bleeding, feeling good FM. She was just concerned because this has never happened. Advised this is probably normal, call if she develops any symptoms. Otherwise she has [facility] appt tomorrow morning then f/u with me afterwards. Pt V.U.

## 2024-01-29 NOTE — TELEPHONE ENCOUNTER
PT CALLING SHE STATES A POPPING SENSATION IN PELVIC AREA EARLIER THIS MORNING      NO PAIN OR ANY OTHER SYMPTOMS PT WOULD LIKE TO MAKE SURE IT'S NOTHING TO BE CONCERNED ABOUT

## 2024-01-30 ENCOUNTER — HOSPITAL ENCOUNTER (OUTPATIENT)
Dept: WOMENS IMAGING | Facility: HOSPITAL | Age: 29
Discharge: HOME | End: 2024-01-30
Admitting: OBSTETRICS & GYNECOLOGY
Payer: OTHER GOVERNMENT

## 2024-01-30 ENCOUNTER — ROUTINE PRENATAL (OUTPATIENT)
Dept: OBSTETRICS AND GYNECOLOGY | Facility: CLINIC | Age: 29
End: 2024-01-30
Payer: OTHER GOVERNMENT

## 2024-01-30 ENCOUNTER — OFFICE VISIT (OUTPATIENT)
Dept: OBSTETRICS AND GYNECOLOGY | Facility: HOSPITAL | Age: 29
End: 2024-01-30
Payer: OTHER GOVERNMENT

## 2024-01-30 VITALS — WEIGHT: 172 LBS | BODY MASS INDEX: 32.5 KG/M2 | SYSTOLIC BLOOD PRESSURE: 128 MMHG | DIASTOLIC BLOOD PRESSURE: 80 MMHG

## 2024-01-30 VITALS — DIASTOLIC BLOOD PRESSURE: 86 MMHG | SYSTOLIC BLOOD PRESSURE: 122 MMHG | WEIGHT: 172 LBS | BODY MASS INDEX: 32.5 KG/M2

## 2024-01-30 DIAGNOSIS — O30.033 MONOCHORIONIC DIAMNIOTIC TWIN GESTATION IN THIRD TRIMESTER: Primary | ICD-10-CM

## 2024-01-30 DIAGNOSIS — Z34.03 PRENATAL CARE, FIRST PREGNANCY, THIRD TRIMESTER: ICD-10-CM

## 2024-01-30 DIAGNOSIS — L29.9 ITCHING: ICD-10-CM

## 2024-01-30 DIAGNOSIS — O30.031 MONOCHORIONIC DIAMNIOTIC TWIN GESTATION IN FIRST TRIMESTER: ICD-10-CM

## 2024-01-30 DIAGNOSIS — O60.03 PRETERM LABOR IN THIRD TRIMESTER WITHOUT DELIVERY: ICD-10-CM

## 2024-01-30 DIAGNOSIS — Z34.90 PREGNANCY, UNSPECIFIED GESTATIONAL AGE: ICD-10-CM

## 2024-01-30 LAB
GLUCOSE UR STRIP-MCNC: NEGATIVE MG/DL
PROT UR STRIP-MCNC: NEGATIVE MG/DL

## 2024-01-30 PROCEDURE — 76820 UMBILICAL ARTERY ECHO: CPT

## 2024-01-30 PROCEDURE — 76819 FETAL BIOPHYS PROFIL W/O NST: CPT

## 2024-01-30 NOTE — ASSESSMENT & PLAN NOTE
Patient returns today after been hospitalized for  labor with monochorionic diamniotic twins.  She has had a recent growth scram with normally grown twins and is for BPP and fluid assessment today.    Ultrasound today demonstrates normal normal amniotic fluid and Dopplers about both twins.  Abdominal circumference is for both twins were measured and were normal as well.  BPP was 8 out of 8 for both twins.    Patient's appears to have a monochorionic diamniotic twins with the biggest problem being  labor.  We concur with her decreased activity and increased monitoring.  Patient was counseled extensively regarding  labor and will contact her provider immediately if she notices regular contractions even if mild.    Patient was counseled extensively regarding fetal movement will contact her provider immediately if she notices any decreased fetal movement.

## 2024-01-30 NOTE — PROGRESS NOTES
"Documentation of the ultrasound findings, images, and interpretations will be available in the patient's Viewpoint report which is located in the imaging tab in chart review.    Maternal/Fetal Medicine Follow Up  Note     Name: Shweta Canela    : 1995     MRN: [id number]     Referring Provider: CORRINE Solomon    Chief Complaint  CC: twins, PTL    Subjective     History of Present Illness:  Shweta Canela is a 28 y.o.  33w3d who presents today for twins, PTL    HUA: Estimated Date of Delivery: 3/16/24     ROS:   As noted in HPI.     Objective     Vital Signs  /80   Wt 78 kg (172 lb)   Estimated body mass index is 32.5 kg/m² as calculated from the following:    Height as of 23: 154.9 cm (61\").    Weight as of this encounter: 78 kg (172 lb).    Physical Exam    Ultrasound Impression:   See Viewpoint    Assessment and Plan     Shweta Canela is a 28 y.o.  33w3d who presents today for Twins, PTL    Diagnoses and all orders for this visit:    1. Monochorionic diamniotic twin gestation in third trimester (Primary)  Assessment & Plan:  Patient returns today after been hospitalized for  labor with monochorionic diamniotic twins.  She has had a recent growth scram with normally grown twins and is for BPP and fluid assessment today.    Ultrasound today demonstrates normal normal amniotic fluid and Dopplers about both twins.  Abdominal circumference is for both twins were measured and were normal as well.  BPP was 8 out of 8 for both twins.    Patient's appears to have a monochorionic diamniotic twins with the biggest problem being  labor.  We concur with her decreased activity and increased monitoring.  Patient was counseled extensively regarding  labor and will contact her provider immediately if she notices regular contractions even if mild.    Patient was counseled extensively regarding fetal movement will contact her provider immediately if " she notices any decreased fetal movement.    Orders:  -     [facility]; [facility]  -     [facility]; Future    2.  labor in third trimester without delivery  -     [facility]; [facility]  -     [facility]; Future    3. Pregnancy, unspecified gestational age  -     [facility]; Future  -     [facility]; Future         Follow Up  Return in about 2 weeks (around 2024).    I spent 15 minutes caring for the patient on the day of service. This included: obtaining or reviewing a separately obtained medical history, reviewing patient records, performing a medically appropriate exam and/or evaluation, counseling or educating the patient/family/caregiver, ordering medications, labs, and/or procedures and documenting such in the medical record. This does not include time spent on review and interpretation of other tests such as fetal ultrasound or the performance of other procedures such as amniocentesis or CVS.      Douglas A. Milligan, MD  Maternal Fetal Medicine, [facility]    [facility]     2024

## 2024-01-30 NOTE — PROGRESS NOTES
Pt has appt with [facility] today and Friday. NIPT negative. Pt has short cervix and is not working. Pt getting NSTs twice weekly. Pt denies vaginal bleeding or loss of fluid. Pt reports sporadic contractions.

## 2024-01-30 NOTE — PROGRESS NOTES
OB FOLLOW UP  CC- Here for care of pregnancy        Shweta Canela is a 28 y.o.  33w3d patient being seen today for her obstetrical follow up visit. Patient reports nausea, increased fatigue, increased swelling in hands, feet, and legs(within this past week), headaches(relieved by rest), and itching on feet only at night.        Her prenatal care is complicated by (and status) :   Patient Active Problem List   Diagnosis    Monochorionic diamniotic twin gestation in third trimester    Migraines    Anxiety    Asthma    Chronic nonintractable headache    Eye pain, left    Contraceptive management    Encounter for Papanicolaou smear of cervix    Screening for STD (sexually transmitted disease)    Depression, controlled    Moderate persistent asthma without complication    Mood swings    Motion sickness    Murmur    Non-recurrent acute suppurative otitis media of left ear without spontaneous rupture of tympanic membrane    Oral aphthous ulcer    Ulcer mouth    Serous otitis media with rupture of tympanic membrane    Migraine, intractable    Cervical shortening affecting pregnancy     labor in third trimester without delivery    Pregnancy       Flu Status: Already given in current flu season  Ultrasound Today: Yes at [facility]. /143. BPP 8/8 for both, normal PHILIP for both. UA dopplers normal. F/u in two weeks. Pt is upset because they were supposed to repeat EFW today and they did not- and she feels she was rushed out of the appt and her questions were not answered. She is wanting to discuss when she will likely need to deliver, and she's been told PDC will be the one to make recommendations.  Non Stress Test: No.    ROS -   Patient Denies: Loss of Fluid, Vaginal Spotting, Vision Changes, Vomiting , Contractions, and Epigastric pain  Fetal Movement : normal  All other systems reviewed and are negative.       The additional following portions of the patient's history were reviewed and updated as  appropriate: allergies and current medications.    I have reviewed and agree with the HPI, ROS, and historical information as entered above. CORRINE Fulton      /86   Wt 78 kg (172 lb)   BMI 32.50 kg/m²       EXAM:     Prenatal Vitals  BP: 122/86  Weight: 78 kg (172 lb)   Fetal Heart Rate: 140/143               Urine Glucose Read-only: Negative  Urine Protein Read-only: Negative           Assessment and Plan    Problem List Items Addressed This Visit       Monochorionic diamniotic twin gestation in third trimester - Primary    Overview     [facility] ultrasound every 4 weeks  11/21/23 12/20/23  US with Curtis every 4 weeks (2 weeks in between pdc)  12/5/23  Start NST 2x weekly @ 32 weeks          Other Visit Diagnoses       Prenatal care, first pregnancy, third trimester        Relevant Orders    POC Urinalysis Dipstick (Completed)    Itching        Relevant Orders    Bile Acids, Total    Comprehensive Metabolic Panel            Pregnancy at 33w3d  Fetal status reassuring x2.   Activity and Exercise discussed.  Bile acids/cmp drawn today.   For now, keep next u/s appt, will discuss with Curtis if she needs to have EFW done sooner than 2/13.   Fetal movement/PTL or Labor precautions  Reviewed Pre-eclampsia signs/symptoms  Return for Next scheduled follow up, NST, Kirsten.    CORRINE Fulton  01/30/2024

## 2024-01-31 ENCOUNTER — HOSPITAL ENCOUNTER (INPATIENT)
Facility: HOSPITAL | Age: 29
LOS: 5 days | Discharge: HOME | End: 2024-02-05
Attending: OBSTETRICS & GYNECOLOGY | Admitting: OBSTETRICS & GYNECOLOGY
Payer: COMMERCIAL

## 2024-01-31 PROBLEM — O60.03 PRETERM LABOR IN THIRD TRIMESTER: Status: ACTIVE | Noted: 2024-01-31

## 2024-01-31 LAB
ABO GROUP BLD: NORMAL
ALBUMIN SERPL-MCNC: 3.4 G/DL (ref 4–5)
ALBUMIN/GLOB SERPL: 1.4 {RATIO} (ref 1.2–2.2)
ALP SERPL-CCNC: 115 IU/L (ref 44–121)
ALP SERPL-CCNC: 123 U/L (ref 39–117)
ALT SERPL W P-5'-P-CCNC: 12 U/L (ref 1–33)
ALT SERPL-CCNC: 13 IU/L (ref 0–32)
AST SERPL-CCNC: 21 IU/L (ref 0–40)
AST SERPL-CCNC: 22 U/L (ref 1–32)
BASOPHILS # BLD AUTO: 0.03 10*3/MM3 (ref 0–0.2)
BASOPHILS NFR BLD AUTO: 0.3 % (ref 0–1.5)
BILIRUB SERPL-MCNC: 0.2 MG/DL (ref 0–1.2)
BILIRUB SERPL-MCNC: <0.2 MG/DL (ref 0–1.2)
BILIRUB UR QL STRIP: NEGATIVE
BLD GP AB SCN SERPL QL: NEGATIVE
BUN SERPL-MCNC: 6 MG/DL (ref 6–20)
BUN/CREAT SERPL: 9 (ref 9–23)
CALCIUM SERPL-MCNC: 9.5 MG/DL (ref 8.7–10.2)
CHLORIDE SERPL-SCNC: 105 MMOL/L (ref 96–106)
CLARITY UR: CLEAR
CO2 SERPL-SCNC: 19 MMOL/L (ref 20–29)
COLOR UR: YELLOW
CREAT SERPL-MCNC: 0.64 MG/DL (ref 0.57–1)
CREAT SERPL-MCNC: 0.65 MG/DL (ref 0.57–1)
DEPRECATED RDW RBC AUTO: 45 FL (ref 37–54)
EGFRCR SERPLBLD CKD-EPI 2021: 124 ML/MIN/1.73
EOSINOPHIL # BLD AUTO: 0.12 10*3/MM3 (ref 0–0.4)
EOSINOPHIL NFR BLD AUTO: 1 % (ref 0.3–6.2)
ERYTHROCYTE [DISTWIDTH] IN BLOOD BY AUTOMATED COUNT: 12.8 % (ref 12.3–15.4)
GLOBULIN SER CALC-MCNC: 2.4 G/DL (ref 1.5–4.5)
GLUCOSE SERPL-MCNC: 54 MG/DL (ref 70–99)
GLUCOSE UR STRIP-MCNC: NEGATIVE MG/DL
HCT VFR BLD AUTO: 34.4 % (ref 34–46.6)
HGB BLD-MCNC: 12.4 G/DL (ref 12–15.9)
HGB UR QL STRIP.AUTO: NEGATIVE
IMM GRANULOCYTES # BLD AUTO: 0.16 10*3/MM3 (ref 0–0.05)
IMM GRANULOCYTES NFR BLD AUTO: 1.4 % (ref 0–0.5)
KETONES UR QL STRIP: ABNORMAL
LDH SERPL-CCNC: 263 U/L (ref 135–214)
LEUKOCYTE ESTERASE UR QL STRIP.AUTO: NEGATIVE
LYMPHOCYTES # BLD AUTO: 3.2 10*3/MM3 (ref 0.7–3.1)
LYMPHOCYTES NFR BLD AUTO: 27.1 % (ref 19.6–45.3)
MCH RBC QN AUTO: 34.6 PG (ref 26.6–33)
MCHC RBC AUTO-ENTMCNC: 36 G/DL (ref 31.5–35.7)
MCV RBC AUTO: 96.1 FL (ref 79–97)
MONOCYTES # BLD AUTO: 1.11 10*3/MM3 (ref 0.1–0.9)
MONOCYTES NFR BLD AUTO: 9.4 % (ref 5–12)
NEUTROPHILS NFR BLD AUTO: 60.8 % (ref 42.7–76)
NEUTROPHILS NFR BLD AUTO: 7.19 10*3/MM3 (ref 1.7–7)
NITRITE UR QL STRIP: NEGATIVE
NRBC BLD AUTO-RTO: 0.2 /100 WBC (ref 0–0.2)
PH UR STRIP.AUTO: 6.5 [PH] (ref 5–8)
PLATELET # BLD AUTO: 135 10*3/MM3 (ref 140–450)
PMV BLD AUTO: 11.1 FL (ref 6–12)
POTASSIUM SERPL-SCNC: 3.8 MMOL/L (ref 3.5–5.2)
PROT SERPL-MCNC: 5.8 G/DL (ref 6–8.5)
PROT UR QL STRIP: NEGATIVE
RBC # BLD AUTO: 3.58 10*6/MM3 (ref 3.77–5.28)
RH BLD: POSITIVE
SODIUM SERPL-SCNC: 139 MMOL/L (ref 134–144)
SP GR UR STRIP: 1.01 (ref 1–1.03)
T&S EXPIRATION DATE: NORMAL
URATE SERPL-MCNC: 6.3 MG/DL (ref 2.4–5.7)
UROBILINOGEN UR QL STRIP: ABNORMAL
WBC NRBC COR # BLD AUTO: 11.81 10*3/MM3 (ref 3.4–10.8)

## 2024-01-31 PROCEDURE — 86901 BLOOD TYPING SEROLOGIC RH(D): CPT | Performed by: OBSTETRICS & GYNECOLOGY

## 2024-01-31 PROCEDURE — 84550 ASSAY OF BLOOD/URIC ACID: CPT | Performed by: OBSTETRICS & GYNECOLOGY

## 2024-01-31 PROCEDURE — 86780 TREPONEMA PALLIDUM: CPT | Performed by: OBSTETRICS & GYNECOLOGY

## 2024-01-31 PROCEDURE — 36415 COLL VENOUS BLD VENIPUNCTURE: CPT | Performed by: OBSTETRICS & GYNECOLOGY

## 2024-01-31 PROCEDURE — 86923 COMPATIBILITY TEST ELECTRIC: CPT

## 2024-01-31 PROCEDURE — 81003 URINALYSIS AUTO W/O SCOPE: CPT | Performed by: OBSTETRICS & GYNECOLOGY

## 2024-01-31 PROCEDURE — 83615 LACTATE (LD) (LDH) ENZYME: CPT | Performed by: OBSTETRICS & GYNECOLOGY

## 2024-01-31 PROCEDURE — 84460 ALANINE AMINO (ALT) (SGPT): CPT | Performed by: OBSTETRICS & GYNECOLOGY

## 2024-01-31 PROCEDURE — S0260 H&P FOR SURGERY: HCPCS | Performed by: OBSTETRICS & GYNECOLOGY

## 2024-01-31 PROCEDURE — 84075 ASSAY ALKALINE PHOSPHATASE: CPT | Performed by: OBSTETRICS & GYNECOLOGY

## 2024-01-31 PROCEDURE — 86900 BLOOD TYPING SEROLOGIC ABO: CPT | Performed by: OBSTETRICS & GYNECOLOGY

## 2024-01-31 PROCEDURE — 86850 RBC ANTIBODY SCREEN: CPT | Performed by: OBSTETRICS & GYNECOLOGY

## 2024-01-31 PROCEDURE — 85025 COMPLETE CBC W/AUTO DIFF WBC: CPT | Performed by: OBSTETRICS & GYNECOLOGY

## 2024-01-31 PROCEDURE — 82247 BILIRUBIN TOTAL: CPT | Performed by: OBSTETRICS & GYNECOLOGY

## 2024-01-31 PROCEDURE — 84450 TRANSFERASE (AST) (SGOT): CPT | Performed by: OBSTETRICS & GYNECOLOGY

## 2024-01-31 PROCEDURE — 82565 ASSAY OF CREATININE: CPT | Performed by: OBSTETRICS & GYNECOLOGY

## 2024-01-31 RX ORDER — ONDANSETRON 2 MG/ML
4 INJECTION INTRAMUSCULAR; INTRAVENOUS EVERY 8 HOURS PRN
Status: DISCONTINUED | OUTPATIENT
Start: 2024-01-31 | End: 2024-02-05 | Stop reason: HOSPADM

## 2024-01-31 RX ORDER — LIDOCAINE HYDROCHLORIDE 10 MG/ML
0.5 INJECTION, SOLUTION EPIDURAL; INFILTRATION; INTRACAUDAL; PERINEURAL ONCE AS NEEDED
Status: DISCONTINUED | OUTPATIENT
Start: 2024-01-31 | End: 2024-02-05 | Stop reason: HOSPADM

## 2024-01-31 RX ORDER — SODIUM CHLORIDE, SODIUM LACTATE, POTASSIUM CHLORIDE, CALCIUM CHLORIDE 600; 310; 30; 20 MG/100ML; MG/100ML; MG/100ML; MG/100ML
75 INJECTION, SOLUTION INTRAVENOUS CONTINUOUS
Status: DISCONTINUED | OUTPATIENT
Start: 2024-01-31 | End: 2024-02-05 | Stop reason: HOSPADM

## 2024-01-31 RX ORDER — SODIUM CHLORIDE 9 MG/ML
40 INJECTION, SOLUTION INTRAVENOUS AS NEEDED
Status: DISCONTINUED | OUTPATIENT
Start: 2024-01-31 | End: 2024-02-03

## 2024-01-31 RX ORDER — DOCUSATE SODIUM 100 MG/1
100 CAPSULE, LIQUID FILLED ORAL 2 TIMES DAILY PRN
Status: DISCONTINUED | OUTPATIENT
Start: 2024-01-31 | End: 2024-02-05 | Stop reason: HOSPADM

## 2024-01-31 RX ORDER — ONDANSETRON 4 MG/1
8 TABLET, ORALLY DISINTEGRATING ORAL EVERY 8 HOURS PRN
Status: DISCONTINUED | OUTPATIENT
Start: 2024-01-31 | End: 2024-02-05 | Stop reason: HOSPADM

## 2024-01-31 RX ORDER — BISACODYL 10 MG
10 SUPPOSITORY, RECTAL RECTAL DAILY PRN
Status: DISCONTINUED | OUTPATIENT
Start: 2024-01-31 | End: 2024-02-05 | Stop reason: HOSPADM

## 2024-01-31 RX ORDER — ACETAMINOPHEN 325 MG/1
650 TABLET ORAL EVERY 4 HOURS PRN
Status: DISCONTINUED | OUTPATIENT
Start: 2024-01-31 | End: 2024-02-05 | Stop reason: HOSPADM

## 2024-01-31 RX ORDER — SODIUM CHLORIDE 0.9 % (FLUSH) 0.9 %
10 SYRINGE (ML) INJECTION EVERY 12 HOURS SCHEDULED
Status: DISCONTINUED | OUTPATIENT
Start: 2024-01-31 | End: 2024-02-03

## 2024-01-31 RX ORDER — SODIUM CHLORIDE 0.9 % (FLUSH) 0.9 %
10 SYRINGE (ML) INJECTION AS NEEDED
Status: DISCONTINUED | OUTPATIENT
Start: 2024-01-31 | End: 2024-02-03

## 2024-02-01 ENCOUNTER — ANESTHESIA EVENT (OUTPATIENT)
Dept: LABOR AND DELIVERY | Facility: HOSPITAL | Age: 29
End: 2024-02-01
Payer: COMMERCIAL

## 2024-02-01 ENCOUNTER — ANESTHESIA (OUTPATIENT)
Dept: LABOR AND DELIVERY | Facility: HOSPITAL | Age: 29
End: 2024-02-01
Payer: COMMERCIAL

## 2024-02-01 LAB
ALP SERPL-CCNC: 78 U/L (ref 39–117)
ALP SERPL-CCNC: 93 U/L (ref 39–117)
ALT SERPL W P-5'-P-CCNC: 11 U/L (ref 1–33)
ALT SERPL W P-5'-P-CCNC: 11 U/L (ref 1–33)
ARTERIAL PATENCY WRIST A: ABNORMAL
AST SERPL-CCNC: 23 U/L (ref 1–32)
AST SERPL-CCNC: 28 U/L (ref 1–32)
ATMOSPHERIC PRESS: ABNORMAL MM[HG]
BASE EXCESS BLDA CALC-SCNC: -2.9 MMOL/L (ref 0–2)
BASE EXCESS BLDCOA CALC-SCNC: -3.3 MMOL/L (ref 0–2)
BASE EXCESS BLDCOA CALC-SCNC: -3.8 MMOL/L (ref 0–2)
BASE EXCESS BLDCOV CALC-SCNC: -3.1 MMOL/L (ref 0–2)
BASOPHILS # BLD AUTO: 0.04 10*3/MM3 (ref 0–0.2)
BASOPHILS # BLD AUTO: 0.05 10*3/MM3 (ref 0–0.2)
BASOPHILS NFR BLD AUTO: 0.3 % (ref 0–1.5)
BASOPHILS NFR BLD AUTO: 0.3 % (ref 0–1.5)
BDY SITE: ABNORMAL
BILE AC SERPL-SCNC: 2.9 UMOL/L (ref 0–10)
BILIRUB SERPL-MCNC: 0.3 MG/DL (ref 0–1.2)
BILIRUB SERPL-MCNC: <0.2 MG/DL (ref 0–1.2)
BODY TEMPERATURE: 37
CO2 BLDA-SCNC: 23.4 MMOL/L (ref 22–33)
CO2 BLDA-SCNC: 23.4 MMOL/L (ref 22–33)
CO2 BLDA-SCNC: 24.1 MMOL/L (ref 22–33)
CO2 BLDA-SCNC: 24.6 MMOL/L (ref 22–33)
COHGB MFR BLD: 1.5 % (ref 0–2)
CREAT SERPL-MCNC: 0.61 MG/DL (ref 0.57–1)
CREAT SERPL-MCNC: 0.62 MG/DL (ref 0.57–1)
DEPRECATED RDW RBC AUTO: 45.2 FL (ref 37–54)
DEPRECATED RDW RBC AUTO: 45.9 FL (ref 37–54)
DEPRECATED RDW RBC AUTO: 47.6 FL (ref 37–54)
EOSINOPHIL # BLD AUTO: 0 10*3/MM3 (ref 0–0.4)
EOSINOPHIL # BLD AUTO: 0.04 10*3/MM3 (ref 0–0.4)
EOSINOPHIL NFR BLD AUTO: 0 % (ref 0.3–6.2)
EOSINOPHIL NFR BLD AUTO: 0.3 % (ref 0.3–6.2)
EPAP: 0
ERYTHROCYTE [DISTWIDTH] IN BLOOD BY AUTOMATED COUNT: 13 % (ref 12.3–15.4)
ERYTHROCYTE [DISTWIDTH] IN BLOOD BY AUTOMATED COUNT: 13.8 % (ref 12.3–15.4)
ERYTHROCYTE [DISTWIDTH] IN BLOOD BY AUTOMATED COUNT: 14.2 % (ref 12.3–15.4)
FIBRINOGEN PPP-MCNC: 410 MG/DL (ref 203–470)
HCO3 BLDA-SCNC: 22.2 MMOL/L (ref 20–26)
HCO3 BLDCOA-SCNC: 22.7 MMOL/L (ref 16.9–20.5)
HCO3 BLDCOA-SCNC: 23.1 MMOL/L (ref 16.9–20.5)
HCO3 BLDCOV-SCNC: 22.2 MMOL/L (ref 18.6–21.4)
HCT VFR BLD AUTO: 29.3 % (ref 34–46.6)
HCT VFR BLD AUTO: 30 % (ref 34–46.6)
HCT VFR BLD AUTO: 37.1 % (ref 34–46.6)
HCT VFR BLD CALC: 43.1 % (ref 38–51)
HGB BLD-MCNC: 10.4 G/DL (ref 12–15.9)
HGB BLD-MCNC: 10.8 G/DL (ref 12–15.9)
HGB BLD-MCNC: 13.1 G/DL (ref 12–15.9)
HGB BLDA-MCNC: 13.4 G/DL (ref 14–18)
HGB BLDA-MCNC: 13.6 G/DL (ref 14–18)
HGB BLDA-MCNC: 14.1 G/DL (ref 14–18)
HGB BLDA-MCNC: 14.6 G/DL (ref 14–18)
IMM GRANULOCYTES # BLD AUTO: 0.14 10*3/MM3 (ref 0–0.05)
IMM GRANULOCYTES # BLD AUTO: 0.18 10*3/MM3 (ref 0–0.05)
IMM GRANULOCYTES NFR BLD AUTO: 1 % (ref 0–0.5)
IMM GRANULOCYTES NFR BLD AUTO: 1.1 % (ref 0–0.5)
INHALED O2 CONCENTRATION: 21 %
IPAP: 0
LDH SERPL-CCNC: 272 U/L (ref 135–214)
LDH SERPL-CCNC: 401 U/L (ref 135–214)
LYMPHOCYTES # BLD AUTO: 1.44 10*3/MM3 (ref 0.7–3.1)
LYMPHOCYTES # BLD AUTO: 1.94 10*3/MM3 (ref 0.7–3.1)
LYMPHOCYTES NFR BLD AUTO: 15.9 % (ref 19.6–45.3)
LYMPHOCYTES NFR BLD AUTO: 7.7 % (ref 19.6–45.3)
Lab: ABNORMAL
MCH RBC QN AUTO: 32.6 PG (ref 26.6–33)
MCH RBC QN AUTO: 33.1 PG (ref 26.6–33)
MCH RBC QN AUTO: 34.4 PG (ref 26.6–33)
MCHC RBC AUTO-ENTMCNC: 35.3 G/DL (ref 31.5–35.7)
MCHC RBC AUTO-ENTMCNC: 35.5 G/DL (ref 31.5–35.7)
MCHC RBC AUTO-ENTMCNC: 36 G/DL (ref 31.5–35.7)
MCV RBC AUTO: 92 FL (ref 79–97)
MCV RBC AUTO: 92.3 FL (ref 79–97)
MCV RBC AUTO: 97 FL (ref 79–97)
METHGB BLD QL: 1.4 % (ref 0–1.5)
MODALITY: ABNORMAL
MONOCYTES # BLD AUTO: 0.71 10*3/MM3 (ref 0.1–0.9)
MONOCYTES # BLD AUTO: 1.26 10*3/MM3 (ref 0.1–0.9)
MONOCYTES NFR BLD AUTO: 5.8 % (ref 5–12)
MONOCYTES NFR BLD AUTO: 6.8 % (ref 5–12)
NEUTROPHILS NFR BLD AUTO: 15.68 10*3/MM3 (ref 1.7–7)
NEUTROPHILS NFR BLD AUTO: 76.6 % (ref 42.7–76)
NEUTROPHILS NFR BLD AUTO: 84.2 % (ref 42.7–76)
NEUTROPHILS NFR BLD AUTO: 9.34 10*3/MM3 (ref 1.7–7)
NOTIFIED BY: ABNORMAL
NOTIFIED WHO: ABNORMAL
NRBC BLD AUTO-RTO: 0 /100 WBC (ref 0–0.2)
NRBC BLD AUTO-RTO: 0.2 /100 WBC (ref 0–0.2)
OXYHGB MFR BLDV: 48.6 % (ref 94–99)
PAW @ PEAK INSP FLOW SETTING VENT: 0 CMH2O
PCO2 BLDA: 39.2 MM HG (ref 35–45)
PCO2 BLDCOA: 45.5 MMHG (ref 43.3–54.9)
PCO2 BLDCOA: 46.1 MMHG (ref 43.3–54.9)
PCO2 BLDCOV: 39.5 MM HG (ref 28–40)
PCO2 TEMP ADJ BLD: 39.2 MM HG (ref 35–45)
PH BLDA: 7.36 PH UNITS (ref 7.35–7.45)
PH BLDCOA: 7.31 PH UNITS (ref 7.22–7.3)
PH BLDCOA: 7.31 PH UNITS (ref 7.22–7.3)
PH BLDCOV: 7.36 PH UNITS (ref 7.31–7.37)
PH, TEMP CORRECTED: 7.36 PH UNITS
PLATELET # BLD AUTO: 108 10*3/MM3 (ref 140–450)
PLATELET # BLD AUTO: 118 10*3/MM3 (ref 140–450)
PLATELET # BLD AUTO: 124 10*3/MM3 (ref 140–450)
PMV BLD AUTO: 10.9 FL (ref 6–12)
PMV BLD AUTO: 11.3 FL (ref 6–12)
PMV BLD AUTO: 11.3 FL (ref 6–12)
PO2 BLDA: 21.3 MM HG (ref 83–108)
PO2 BLDCOA: 19.9 MMHG (ref 11.5–43.3)
PO2 BLDCOA: 22.8 MMHG (ref 11.5–43.3)
PO2 BLDCOV: 23.9 MM HG (ref 21–31)
PO2 TEMP ADJ BLD: 21.3 MM HG (ref 83–108)
RBC # BLD AUTO: 3.02 10*6/MM3 (ref 3.77–5.28)
RBC # BLD AUTO: 3.26 10*6/MM3 (ref 3.77–5.28)
RBC # BLD AUTO: 4.02 10*6/MM3 (ref 3.77–5.28)
SAO2 % BLDCOA: 39.1 %
SAO2 % BLDCOA: 50.7 %
SAO2 % BLDCOA: ABNORMAL %
SAO2 % BLDCOV: 57.9 %
T PALLIDUM IGG SER QL: NORMAL
TOTAL RATE: 0 BREATHS/MINUTE
URATE SERPL-MCNC: 6.4 MG/DL (ref 2.4–5.7)
URATE SERPL-MCNC: 6.8 MG/DL (ref 2.4–5.7)
VENTILATOR MODE: ABNORMAL
WBC NRBC COR # BLD AUTO: 12.21 10*3/MM3 (ref 3.4–10.8)
WBC NRBC COR # BLD AUTO: 12.63 10*3/MM3 (ref 3.4–10.8)
WBC NRBC COR # BLD AUTO: 18.61 10*3/MM3 (ref 3.4–10.8)

## 2024-02-01 PROCEDURE — 82565 ASSAY OF CREATININE: CPT | Performed by: OBSTETRICS & GYNECOLOGY

## 2024-02-01 PROCEDURE — 84460 ALANINE AMINO (ALT) (SGPT): CPT | Performed by: OBSTETRICS & GYNECOLOGY

## 2024-02-01 PROCEDURE — 25810000003 LACTATED RINGERS PER 1000 ML: Performed by: OBSTETRICS & GYNECOLOGY

## 2024-02-01 PROCEDURE — 25010000002 AMPICILLIN PER 500 MG: Performed by: OBSTETRICS & GYNECOLOGY

## 2024-02-01 PROCEDURE — 25810000003 SODIUM CHLORIDE 0.9 % SOLUTION: Performed by: NURSE ANESTHETIST, CERTIFIED REGISTERED

## 2024-02-01 PROCEDURE — 82375 ASSAY CARBOXYHB QUANT: CPT

## 2024-02-01 PROCEDURE — 84075 ASSAY ALKALINE PHOSPHATASE: CPT | Performed by: OBSTETRICS & GYNECOLOGY

## 2024-02-01 PROCEDURE — 85384 FIBRINOGEN ACTIVITY: CPT | Performed by: OBSTETRICS & GYNECOLOGY

## 2024-02-01 PROCEDURE — 36600 WITHDRAWAL OF ARTERIAL BLOOD: CPT

## 2024-02-01 PROCEDURE — 25010000002 KETOROLAC TROMETHAMINE PER 15 MG: Performed by: OBSTETRICS & GYNECOLOGY

## 2024-02-01 PROCEDURE — 25010000002 ONDANSETRON PER 1 MG: Performed by: NURSE ANESTHETIST, CERTIFIED REGISTERED

## 2024-02-01 PROCEDURE — 25010000002 MAGNESIUM SULFATE 20 GM/500ML SOLUTION: Performed by: OBSTETRICS & GYNECOLOGY

## 2024-02-01 PROCEDURE — 83615 LACTATE (LD) (LDH) ENZYME: CPT | Performed by: OBSTETRICS & GYNECOLOGY

## 2024-02-01 PROCEDURE — 25010000002 MIDAZOLAM PER 1 MG: Performed by: NURSE ANESTHETIST, CERTIFIED REGISTERED

## 2024-02-01 PROCEDURE — 25010000002 FENTANYL CITRATE (PF) 100 MCG/2ML SOLUTION: Performed by: NURSE ANESTHETIST, CERTIFIED REGISTERED

## 2024-02-01 PROCEDURE — 85025 COMPLETE CBC W/AUTO DIFF WBC: CPT | Performed by: OBSTETRICS & GYNECOLOGY

## 2024-02-01 PROCEDURE — 84450 TRANSFERASE (AST) (SGOT): CPT | Performed by: OBSTETRICS & GYNECOLOGY

## 2024-02-01 PROCEDURE — 36430 TRANSFUSION BLD/BLD COMPNT: CPT

## 2024-02-01 PROCEDURE — 82247 BILIRUBIN TOTAL: CPT | Performed by: OBSTETRICS & GYNECOLOGY

## 2024-02-01 PROCEDURE — 25010000002 CEFAZOLIN PER 500 MG: Performed by: OBSTETRICS & GYNECOLOGY

## 2024-02-01 PROCEDURE — 82805 BLOOD GASES W/O2 SATURATION: CPT

## 2024-02-01 PROCEDURE — 84550 ASSAY OF BLOOD/URIC ACID: CPT | Performed by: OBSTETRICS & GYNECOLOGY

## 2024-02-01 PROCEDURE — 88307 TISSUE EXAM BY PATHOLOGIST: CPT | Performed by: OBSTETRICS & GYNECOLOGY

## 2024-02-01 PROCEDURE — 25010000002 BUPIVACAINE IN DEXTROSE 0.75-8.25 % SOLUTION: Performed by: NURSE ANESTHETIST, CERTIFIED REGISTERED

## 2024-02-01 PROCEDURE — 25010000002 MORPHINE PER 10 MG: Performed by: NURSE ANESTHETIST, CERTIFIED REGISTERED

## 2024-02-01 PROCEDURE — 83050 HGB METHEMOGLOBIN QUAN: CPT

## 2024-02-01 PROCEDURE — 86900 BLOOD TYPING SEROLOGIC ABO: CPT

## 2024-02-01 PROCEDURE — 85027 COMPLETE CBC AUTOMATED: CPT | Performed by: OBSTETRICS & GYNECOLOGY

## 2024-02-01 PROCEDURE — 25010000002 PHENYLEPHRINE 10 MG/ML SOLUTION: Performed by: NURSE ANESTHETIST, CERTIFIED REGISTERED

## 2024-02-01 PROCEDURE — 25010000002 METHYLERGONOVINE MALEATE PER 0.2 MG: Performed by: NURSE ANESTHETIST, CERTIFIED REGISTERED

## 2024-02-01 PROCEDURE — 25010000002 OXYTOCIN PER 10 UNITS: Performed by: NURSE ANESTHETIST, CERTIFIED REGISTERED

## 2024-02-01 PROCEDURE — 59510 CESAREAN DELIVERY: CPT | Performed by: OBSTETRICS & GYNECOLOGY

## 2024-02-01 PROCEDURE — P9016 RBC LEUKOCYTES REDUCED: HCPCS

## 2024-02-01 PROCEDURE — 0W3R0ZZ CONTROL BLEEDING IN GENITOURINARY TRACT, OPEN APPROACH: ICD-10-PCS | Performed by: OBSTETRICS & GYNECOLOGY

## 2024-02-01 PROCEDURE — C1765 ADHESION BARRIER: HCPCS | Performed by: OBSTETRICS & GYNECOLOGY

## 2024-02-01 DEVICE — BARR ADHS I/O INTERCEED ABS 3X4IN: Type: IMPLANTABLE DEVICE | Status: FUNCTIONAL

## 2024-02-01 RX ORDER — OXYTOCIN 10 [USP'U]/ML
INJECTION, SOLUTION INTRAMUSCULAR; INTRAVENOUS AS NEEDED
Status: DISCONTINUED | OUTPATIENT
Start: 2024-02-01 | End: 2024-02-01 | Stop reason: SURG

## 2024-02-01 RX ORDER — SODIUM CHLORIDE 9 MG/ML
INJECTION, SOLUTION INTRAVENOUS CONTINUOUS PRN
Status: DISCONTINUED | OUTPATIENT
Start: 2024-02-01 | End: 2024-02-01 | Stop reason: SURG

## 2024-02-01 RX ORDER — DIPHENHYDRAMINE HYDROCHLORIDE 50 MG/ML
25 INJECTION INTRAMUSCULAR; INTRAVENOUS EVERY 4 HOURS PRN
Status: DISCONTINUED | OUTPATIENT
Start: 2024-02-01 | End: 2024-02-05 | Stop reason: HOSPADM

## 2024-02-01 RX ORDER — PRENATAL VIT/IRON FUM/FOLIC AC 27MG-0.8MG
1 TABLET ORAL DAILY
Status: DISCONTINUED | OUTPATIENT
Start: 2024-02-01 | End: 2024-02-05 | Stop reason: HOSPADM

## 2024-02-01 RX ORDER — FAMOTIDINE 10 MG/ML
INJECTION, SOLUTION INTRAVENOUS AS NEEDED
Status: DISCONTINUED | OUTPATIENT
Start: 2024-02-01 | End: 2024-02-01 | Stop reason: SURG

## 2024-02-01 RX ORDER — SODIUM CHLORIDE 0.9 % (FLUSH) 0.9 %
10 SYRINGE (ML) INJECTION EVERY 12 HOURS SCHEDULED
Status: DISCONTINUED | OUTPATIENT
Start: 2024-02-01 | End: 2024-02-03

## 2024-02-01 RX ORDER — CARBOPROST TROMETHAMINE 250 UG/ML
INJECTION, SOLUTION INTRAMUSCULAR AS NEEDED
Status: DISCONTINUED | OUTPATIENT
Start: 2024-02-01 | End: 2024-02-01 | Stop reason: SURG

## 2024-02-01 RX ORDER — DIPHENHYDRAMINE HYDROCHLORIDE 50 MG/ML
25 INJECTION INTRAMUSCULAR; INTRAVENOUS ONCE AS NEEDED
Status: DISCONTINUED | OUTPATIENT
Start: 2024-02-01 | End: 2024-02-05 | Stop reason: HOSPADM

## 2024-02-01 RX ORDER — ACETAMINOPHEN 500 MG
1000 TABLET ORAL ONCE
Status: COMPLETED | OUTPATIENT
Start: 2024-02-01 | End: 2024-02-01

## 2024-02-01 RX ORDER — OXYTOCIN/0.9 % SODIUM CHLORIDE 30/500 ML
125 PLASTIC BAG, INJECTION (ML) INTRAVENOUS CONTINUOUS PRN
Status: COMPLETED | OUTPATIENT
Start: 2024-02-01 | End: 2024-02-01

## 2024-02-01 RX ORDER — OXYCODONE HYDROCHLORIDE 10 MG/1
10 TABLET ORAL EVERY 4 HOURS PRN
Status: DISCONTINUED | OUTPATIENT
Start: 2024-02-01 | End: 2024-02-05 | Stop reason: HOSPADM

## 2024-02-01 RX ORDER — IBUPROFEN 600 MG/1
600 TABLET ORAL EVERY 6 HOURS
Status: DISCONTINUED | OUTPATIENT
Start: 2024-02-02 | End: 2024-02-05 | Stop reason: HOSPADM

## 2024-02-01 RX ORDER — OXYTOCIN/0.9 % SODIUM CHLORIDE 30/500 ML
PLASTIC BAG, INJECTION (ML) INTRAVENOUS AS NEEDED
Status: DISCONTINUED | OUTPATIENT
Start: 2024-02-01 | End: 2024-02-01 | Stop reason: SURG

## 2024-02-01 RX ORDER — ACETAMINOPHEN 325 MG/1
650 TABLET ORAL EVERY 6 HOURS
Status: DISCONTINUED | OUTPATIENT
Start: 2024-02-02 | End: 2024-02-05 | Stop reason: HOSPADM

## 2024-02-01 RX ORDER — MAGNESIUM SULFATE HEPTAHYDRATE 40 MG/ML
1 INJECTION, SOLUTION INTRAVENOUS CONTINUOUS
Status: DISCONTINUED | OUTPATIENT
Start: 2024-02-01 | End: 2024-02-02

## 2024-02-01 RX ORDER — SODIUM CHLORIDE, SODIUM LACTATE, POTASSIUM CHLORIDE, CALCIUM CHLORIDE 600; 310; 30; 20 MG/100ML; MG/100ML; MG/100ML; MG/100ML
125 INJECTION, SOLUTION INTRAVENOUS CONTINUOUS
Status: DISCONTINUED | OUTPATIENT
Start: 2024-02-01 | End: 2024-02-05 | Stop reason: HOSPADM

## 2024-02-01 RX ORDER — CARBOPROST TROMETHAMINE 250 UG/ML
250 INJECTION, SOLUTION INTRAMUSCULAR
Status: DISCONTINUED | OUTPATIENT
Start: 2024-02-01 | End: 2024-02-05 | Stop reason: HOSPADM

## 2024-02-01 RX ORDER — HYDROXYZINE HYDROCHLORIDE 25 MG/1
50 TABLET, FILM COATED ORAL EVERY 6 HOURS PRN
Status: DISCONTINUED | OUTPATIENT
Start: 2024-02-01 | End: 2024-02-05 | Stop reason: HOSPADM

## 2024-02-01 RX ORDER — ACETAMINOPHEN 500 MG
1000 TABLET ORAL EVERY 6 HOURS
Status: DISPENSED | OUTPATIENT
Start: 2024-02-01 | End: 2024-02-02

## 2024-02-01 RX ORDER — FAMOTIDINE 10 MG/ML
20 INJECTION, SOLUTION INTRAVENOUS 2 TIMES DAILY PRN
Status: DISCONTINUED | OUTPATIENT
Start: 2024-02-01 | End: 2024-02-05 | Stop reason: HOSPADM

## 2024-02-01 RX ORDER — DIPHENHYDRAMINE HCL 25 MG
25 CAPSULE ORAL EVERY 4 HOURS PRN
Status: DISCONTINUED | OUTPATIENT
Start: 2024-02-01 | End: 2024-02-05 | Stop reason: HOSPADM

## 2024-02-01 RX ORDER — SODIUM CHLORIDE 9 MG/ML
40 INJECTION, SOLUTION INTRAVENOUS AS NEEDED
Status: DISCONTINUED | OUTPATIENT
Start: 2024-02-01 | End: 2024-02-03

## 2024-02-01 RX ORDER — NALOXONE HCL 0.4 MG/ML
0.4 VIAL (ML) INJECTION
Status: ACTIVE | OUTPATIENT
Start: 2024-02-01 | End: 2024-02-02

## 2024-02-01 RX ORDER — METHYLERGONOVINE MALEATE 0.2 MG/ML
INJECTION INTRAVENOUS
Status: COMPLETED
Start: 2024-02-01 | End: 2024-02-01

## 2024-02-01 RX ORDER — MISOPROSTOL 200 UG/1
600 TABLET ORAL AS NEEDED
Status: DISCONTINUED | OUTPATIENT
Start: 2024-02-01 | End: 2024-02-05 | Stop reason: HOSPADM

## 2024-02-01 RX ORDER — PROMETHAZINE HYDROCHLORIDE 25 MG/1
25 TABLET ORAL ONCE AS NEEDED
Status: DISCONTINUED | OUTPATIENT
Start: 2024-02-01 | End: 2024-02-05 | Stop reason: HOSPADM

## 2024-02-01 RX ORDER — OXYTOCIN/0.9 % SODIUM CHLORIDE 30/500 ML
999 PLASTIC BAG, INJECTION (ML) INTRAVENOUS ONCE
Status: DISCONTINUED | OUTPATIENT
Start: 2024-02-01 | End: 2024-02-05 | Stop reason: HOSPADM

## 2024-02-01 RX ORDER — ONDANSETRON 2 MG/ML
4 INJECTION INTRAMUSCULAR; INTRAVENOUS ONCE AS NEEDED
Status: DISPENSED | OUTPATIENT
Start: 2024-02-01 | End: 2024-02-02

## 2024-02-01 RX ORDER — ONDANSETRON 2 MG/ML
INJECTION INTRAMUSCULAR; INTRAVENOUS AS NEEDED
Status: DISCONTINUED | OUTPATIENT
Start: 2024-02-01 | End: 2024-02-01 | Stop reason: SURG

## 2024-02-01 RX ORDER — CITRIC ACID/SODIUM CITRATE 334-500MG
30 SOLUTION, ORAL ORAL ONCE
Status: COMPLETED | OUTPATIENT
Start: 2024-02-01 | End: 2024-02-01

## 2024-02-01 RX ORDER — CARBOPROST TROMETHAMINE 250 UG/ML
250 INJECTION, SOLUTION INTRAMUSCULAR AS NEEDED
Status: DISCONTINUED | OUTPATIENT
Start: 2024-02-01 | End: 2024-02-05 | Stop reason: HOSPADM

## 2024-02-01 RX ORDER — FENTANYL CITRATE 50 UG/ML
INJECTION, SOLUTION INTRAMUSCULAR; INTRAVENOUS AS NEEDED
Status: DISCONTINUED | OUTPATIENT
Start: 2024-02-01 | End: 2024-02-01 | Stop reason: SURG

## 2024-02-01 RX ORDER — KETOROLAC TROMETHAMINE 15 MG/ML
15 INJECTION, SOLUTION INTRAMUSCULAR; INTRAVENOUS EVERY 6 HOURS
Status: COMPLETED | OUTPATIENT
Start: 2024-02-01 | End: 2024-02-02

## 2024-02-01 RX ORDER — MIDAZOLAM HYDROCHLORIDE 1 MG/ML
INJECTION INTRAMUSCULAR; INTRAVENOUS AS NEEDED
Status: DISCONTINUED | OUTPATIENT
Start: 2024-02-01 | End: 2024-02-01 | Stop reason: SURG

## 2024-02-01 RX ORDER — SIMETHICONE 80 MG
80 TABLET,CHEWABLE ORAL 4 TIMES DAILY PRN
Status: DISCONTINUED | OUTPATIENT
Start: 2024-02-01 | End: 2024-02-05 | Stop reason: HOSPADM

## 2024-02-01 RX ORDER — PROMETHAZINE HYDROCHLORIDE 12.5 MG/1
12.5 SUPPOSITORY RECTAL ONCE AS NEEDED
Status: DISCONTINUED | OUTPATIENT
Start: 2024-02-01 | End: 2024-02-05 | Stop reason: HOSPADM

## 2024-02-01 RX ORDER — METHYLERGONOVINE MALEATE 0.2 MG/ML
200 INJECTION INTRAVENOUS ONCE AS NEEDED
Status: DISCONTINUED | OUTPATIENT
Start: 2024-02-01 | End: 2024-02-05 | Stop reason: HOSPADM

## 2024-02-01 RX ORDER — OXYCODONE HYDROCHLORIDE 5 MG/1
5 TABLET ORAL EVERY 4 HOURS PRN
Status: DISCONTINUED | OUTPATIENT
Start: 2024-02-01 | End: 2024-02-05 | Stop reason: HOSPADM

## 2024-02-01 RX ORDER — METHYLERGONOVINE MALEATE 0.2 MG/ML
INJECTION INTRAVENOUS AS NEEDED
Status: DISCONTINUED | OUTPATIENT
Start: 2024-02-01 | End: 2024-02-01 | Stop reason: SURG

## 2024-02-01 RX ORDER — MISOPROSTOL 200 UG/1
TABLET ORAL
Status: COMPLETED
Start: 2024-02-01 | End: 2024-02-01

## 2024-02-01 RX ORDER — TRANEXAMIC ACID 10 MG/ML
INJECTION, SOLUTION INTRAVENOUS AS NEEDED
Status: DISCONTINUED | OUTPATIENT
Start: 2024-02-01 | End: 2024-02-01 | Stop reason: SURG

## 2024-02-01 RX ORDER — OXYTOCIN/0.9 % SODIUM CHLORIDE 30/500 ML
250 PLASTIC BAG, INJECTION (ML) INTRAVENOUS CONTINUOUS
Status: ACTIVE | OUTPATIENT
Start: 2024-02-01 | End: 2024-02-01

## 2024-02-01 RX ORDER — LIDOCAINE HYDROCHLORIDE 10 MG/ML
0.5 INJECTION, SOLUTION EPIDURAL; INFILTRATION; INTRACAUDAL; PERINEURAL ONCE AS NEEDED
Status: DISCONTINUED | OUTPATIENT
Start: 2024-02-01 | End: 2024-02-05 | Stop reason: HOSPADM

## 2024-02-01 RX ORDER — PHENYLEPHRINE HCL IN 0.9% NACL 1 MG/10 ML
SYRINGE (ML) INTRAVENOUS AS NEEDED
Status: DISCONTINUED | OUTPATIENT
Start: 2024-02-01 | End: 2024-02-01 | Stop reason: SURG

## 2024-02-01 RX ORDER — KETOROLAC TROMETHAMINE 30 MG/ML
30 INJECTION, SOLUTION INTRAMUSCULAR; INTRAVENOUS ONCE
Status: COMPLETED | OUTPATIENT
Start: 2024-02-01 | End: 2024-02-01

## 2024-02-01 RX ORDER — MORPHINE SULFATE 0.5 MG/ML
INJECTION, SOLUTION EPIDURAL; INTRATHECAL; INTRAVENOUS AS NEEDED
Status: DISCONTINUED | OUTPATIENT
Start: 2024-02-01 | End: 2024-02-01 | Stop reason: SURG

## 2024-02-01 RX ORDER — CARBOPROST TROMETHAMINE 250 UG/ML
INJECTION, SOLUTION INTRAMUSCULAR
Status: COMPLETED
Start: 2024-02-01 | End: 2024-02-01

## 2024-02-01 RX ORDER — CALCIUM CARBONATE 500 MG/1
1 TABLET, CHEWABLE ORAL EVERY 4 HOURS PRN
Status: DISCONTINUED | OUTPATIENT
Start: 2024-02-01 | End: 2024-02-05 | Stop reason: HOSPADM

## 2024-02-01 RX ORDER — BUPIVACAINE HYDROCHLORIDE 7.5 MG/ML
INJECTION, SOLUTION INTRASPINAL AS NEEDED
Status: DISCONTINUED | OUTPATIENT
Start: 2024-02-01 | End: 2024-02-01 | Stop reason: SURG

## 2024-02-01 RX ORDER — ONDANSETRON 4 MG/1
4 TABLET, ORALLY DISINTEGRATING ORAL EVERY 8 HOURS PRN
Status: DISCONTINUED | OUTPATIENT
Start: 2024-02-01 | End: 2024-02-05 | Stop reason: HOSPADM

## 2024-02-01 RX ORDER — PHENYLEPHRINE HYDROCHLORIDE 10 MG/ML
INJECTION INTRAVENOUS AS NEEDED
Status: DISCONTINUED | OUTPATIENT
Start: 2024-02-01 | End: 2024-02-01 | Stop reason: SURG

## 2024-02-01 RX ORDER — HYDROCORTISONE 25 MG/G
1 CREAM TOPICAL AS NEEDED
Status: DISCONTINUED | OUTPATIENT
Start: 2024-02-01 | End: 2024-02-05 | Stop reason: HOSPADM

## 2024-02-01 RX ORDER — ALUMINA, MAGNESIA, AND SIMETHICONE 2400; 2400; 240 MG/30ML; MG/30ML; MG/30ML
15 SUSPENSION ORAL EVERY 4 HOURS PRN
Status: DISCONTINUED | OUTPATIENT
Start: 2024-02-01 | End: 2024-02-05 | Stop reason: HOSPADM

## 2024-02-01 RX ORDER — SODIUM CHLORIDE 0.9 % (FLUSH) 0.9 %
10 SYRINGE (ML) INJECTION AS NEEDED
Status: DISCONTINUED | OUTPATIENT
Start: 2024-02-01 | End: 2024-02-03

## 2024-02-01 RX ORDER — DOCUSATE SODIUM 100 MG/1
100 CAPSULE, LIQUID FILLED ORAL 2 TIMES DAILY PRN
Status: DISCONTINUED | OUTPATIENT
Start: 2024-02-01 | End: 2024-02-05 | Stop reason: HOSPADM

## 2024-02-01 RX ADMIN — TRANEXAMIC ACID 1000 MG: 10 INJECTION, SOLUTION INTRAVENOUS at 08:47

## 2024-02-01 RX ADMIN — SODIUM CITRATE AND CITRIC ACID MONOHYDRATE 30 ML: 500; 334 SOLUTION ORAL at 08:06

## 2024-02-01 RX ADMIN — ACETAMINOPHEN 1000 MG: 500 TABLET ORAL at 07:46

## 2024-02-01 RX ADMIN — OXYTOCIN 3 UNITS: 10 INJECTION, SOLUTION INTRAMUSCULAR; INTRAVENOUS at 08:38

## 2024-02-01 RX ADMIN — KETOROLAC TROMETHAMINE 30 MG: 30 INJECTION, SOLUTION INTRAMUSCULAR; INTRAVENOUS at 11:10

## 2024-02-01 RX ADMIN — SODIUM CHLORIDE 2000 MG: 900 INJECTION INTRAVENOUS at 12:10

## 2024-02-01 RX ADMIN — SODIUM CHLORIDE, POTASSIUM CHLORIDE, SODIUM LACTATE AND CALCIUM CHLORIDE: 600; 310; 30; 20 INJECTION, SOLUTION INTRAVENOUS at 08:34

## 2024-02-01 RX ADMIN — SODIUM CHLORIDE 2000 MG: 900 INJECTION INTRAVENOUS at 20:26

## 2024-02-01 RX ADMIN — Medication 100 MCG: at 08:54

## 2024-02-01 RX ADMIN — Medication 500 ML: at 09:27

## 2024-02-01 RX ADMIN — ACETAMINOPHEN 650 MG: 325 TABLET ORAL at 02:28

## 2024-02-01 RX ADMIN — AMPICILLIN SODIUM 1000 MG: 1 INJECTION, POWDER, FOR SOLUTION INTRAMUSCULAR; INTRAVENOUS at 04:32

## 2024-02-01 RX ADMIN — Medication 125 ML/HR: at 17:32

## 2024-02-01 RX ADMIN — OXYTOCIN 3 UNITS: 10 INJECTION, SOLUTION INTRAMUSCULAR; INTRAVENOUS at 08:42

## 2024-02-01 RX ADMIN — FENTANYL CITRATE 20 MCG: 50 INJECTION, SOLUTION INTRAMUSCULAR; INTRAVENOUS at 08:20

## 2024-02-01 RX ADMIN — MAGNESIUM SULFATE HEPTAHYDRATE 2 G/HR: 40 INJECTION, SOLUTION INTRAVENOUS at 20:26

## 2024-02-01 RX ADMIN — Medication 500 ML: at 08:43

## 2024-02-01 RX ADMIN — CARBOPROST TROMETHAMINE 250 MCG: 250 INJECTION, SOLUTION INTRAMUSCULAR at 09:02

## 2024-02-01 RX ADMIN — MORPHINE SULFATE 150 MCG: 0.5 INJECTION, SOLUTION EPIDURAL; INTRATHECAL; INTRAVENOUS at 08:20

## 2024-02-01 RX ADMIN — KETOROLAC TROMETHAMINE 15 MG: 15 INJECTION, SOLUTION INTRAMUSCULAR; INTRAVENOUS at 22:51

## 2024-02-01 RX ADMIN — FAMOTIDINE 20 MG: 10 INJECTION INTRAVENOUS at 08:15

## 2024-02-01 RX ADMIN — OXYTOCIN 2 UNITS: 10 INJECTION, SOLUTION INTRAMUSCULAR; INTRAVENOUS at 08:47

## 2024-02-01 RX ADMIN — OXYTOCIN 2 UNITS: 10 INJECTION, SOLUTION INTRAMUSCULAR; INTRAVENOUS at 08:51

## 2024-02-01 RX ADMIN — SODIUM CHLORIDE 2000 MG: 900 INJECTION INTRAVENOUS at 08:06

## 2024-02-01 RX ADMIN — ACETAMINOPHEN 1000 MG: 500 TABLET ORAL at 20:20

## 2024-02-01 RX ADMIN — PHENYLEPHRINE HYDROCHLORIDE 100 MCG: 10 INJECTION, SOLUTION INTRAVENOUS at 08:28

## 2024-02-01 RX ADMIN — KETOROLAC TROMETHAMINE 15 MG: 15 INJECTION, SOLUTION INTRAMUSCULAR; INTRAVENOUS at 17:32

## 2024-02-01 RX ADMIN — MISOPROSTOL 800 MCG: 200 TABLET ORAL at 09:36

## 2024-02-01 RX ADMIN — MAGNESIUM SULFATE HEPTAHYDRATE 2 G/HR: 40 INJECTION, SOLUTION INTRAVENOUS at 14:17

## 2024-02-01 RX ADMIN — MIDAZOLAM HYDROCHLORIDE 1 MG: 1 INJECTION, SOLUTION INTRAMUSCULAR; INTRAVENOUS at 08:43

## 2024-02-01 RX ADMIN — ONDANSETRON 4 MG: 2 INJECTION INTRAMUSCULAR; INTRAVENOUS at 12:23

## 2024-02-01 RX ADMIN — SODIUM CHLORIDE: 9 INJECTION, SOLUTION INTRAVENOUS at 09:15

## 2024-02-01 RX ADMIN — SODIUM CHLORIDE, POTASSIUM CHLORIDE, SODIUM LACTATE AND CALCIUM CHLORIDE 75 ML/HR: 600; 310; 30; 20 INJECTION, SOLUTION INTRAVENOUS at 23:04

## 2024-02-01 RX ADMIN — BUPIVACAINE HYDROCHLORIDE IN DEXTROSE 1.4 ML: 7.5 INJECTION, SOLUTION SUBARACHNOID at 08:20

## 2024-02-01 RX ADMIN — FAMOTIDINE 20 MG: 10 INJECTION, SOLUTION INTRAVENOUS at 02:31

## 2024-02-01 RX ADMIN — AMPICILLIN SODIUM 2000 MG: 2 INJECTION, POWDER, FOR SOLUTION INTRAMUSCULAR; INTRAVENOUS at 00:15

## 2024-02-01 RX ADMIN — ONDANSETRON 4 MG: 2 INJECTION INTRAMUSCULAR; INTRAVENOUS at 08:15

## 2024-02-01 RX ADMIN — SODIUM CHLORIDE, POTASSIUM CHLORIDE, SODIUM LACTATE AND CALCIUM CHLORIDE: 600; 310; 30; 20 INJECTION, SOLUTION INTRAVENOUS at 08:14

## 2024-02-01 RX ADMIN — METHYLERGONOVINE MALEATE 200 MCG: 0.2 INJECTION, SOLUTION INTRAMUSCULAR; INTRAVENOUS at 08:40

## 2024-02-01 RX ADMIN — MIDAZOLAM HYDROCHLORIDE 1 MG: 1 INJECTION, SOLUTION INTRAMUSCULAR; INTRAVENOUS at 09:03

## 2024-02-01 RX ADMIN — Medication 200 MCG: at 09:05

## 2024-02-01 NOTE — ANESTHESIA POSTPROCEDURE EVALUATION
Patient: Shweta Canela    Procedure Summary       Date: 24 [facility] / [facility]    Anesthesia Start: 814 Anesthesia Stop: 955    Procedure:  SECTION PRIMARY (Abdomen) Diagnosis:     Surgeons: Selina Curtis MD Provider: Jesse Amanda DO    Anesthesia Type: epidural ASA Status: 3 - Emergent            Anesthesia Type: epidural    Vitals  Vitals Value Taken Time   /91 24 0954   Temp 97.6 °F (36.4 °C) 24 0954   Pulse 78 24 0956   Resp 16 24 0954   SpO2 99 % 24 0956   Vitals shown include unfiled device data.        Post Anesthesia Care and Evaluation    Patient location during evaluation: bedside  Patient participation: complete - patient participated  Level of consciousness: awake and alert  Pain management: adequate    Airway patency: patent  Anesthetic complications: No anesthetic complications    Cardiovascular status: acceptable  Respiratory status: acceptable  Hydration status: acceptable

## 2024-02-01 NOTE — OP NOTE
Section Procedure Note    Indications:  Severe preeclampsia    Pre-operative Diagnosis: 1: 33w5d              2.  Mono/Di twin pregnancy  3.  Preeclampsia with severe features              Post-operative Diagnosis: 1:  Same.  2:  Severe Uterine atony    Procedures:  Procedure(s):   SECTION PRIMARYLTUI  B-Alvarado  Sharonda system placed    Surgeon:  Selina Curtis MD    Assistant:  Assistant: Mikayla Aldana    Anesthesia:  Spinal    Estimated Blood Loss:  2100    Infant:            Gender:    Sherrie CanelaBoy A [[id number]]   male      Shweta CanelasBoy B [[id number]]   male  infant    Weight:    Shweta CanelasBoy A [[id number]]   2170 g (4 lb 12.5 oz)      Sherrie CanelaBoy B [[id number]]   2180 g (4 lb 12.9 oz)     Apgars:    [place], DonaldossicasBoy A [[id number]]   8      [place], JessicasBoy B [[id number]]   8  @ 1 minute /        [place] DonaldossicasBoy A [[id number]]   9      [place], DonaldossicasBoy B [[id number]]   9  @ 5 minutes               Findings:       The infant was delivered from the Presentation/Position:      [place] CanelaicasBoy A [[id number]]   Vertex      [place], JessicasBoy B [[id number]]   Vertex ;      [place], DonaldossicasBoy A [[id number]]         [place], JessicasBoy B [[id number]]          [place], JessicasBoy A [[id number]]         [place], JessicasBoy B [[id number]]          [place], JessicasBoy A [[id number]]         [place], JessicasBoy B [[id number]]      The amnionic fluid was      [place], JessicasBoy A [[id number]]   Clear      [place], JessicasBoy B [[id number]]   Clear     Drains:  Akbar catheter to straight drainage.                 Specimens: * No order type specified *           Antibiotics:  Cefazolin           Complications:  None; patient tolerated the procedure well.           Disposition: PACU - hemodynamically stable.           Condition: stable    Procedure Details   The patient was seen in the Holding Room. The risks, benefits, complications, treatment  options, and expected outcomes were discussed with the patient.  The patient concurred with the proposed plan, giving informed consent.  The patient was taken to the Operating Room, identified as Shweta Canela and the procedure verified as  Delivery. A Time Out was held and the above information confirmed.    After an adequate level of anesthesia was obtained, the patient was draped and prepped in the usual sterile manner. A Pfannenstiel incision was made and carried down through the subcutaneous tissue to the fascia. Fascial incision was made and extended transversely with the Arana scissors. The fascia was  bluntly and sharply from the underlying rectus tissue superiorly and inferiorly. The rectus muscles were divided sharply. The peritoneum was identified and entered. Peritoneal incision was extended longitudinally taking care not to injure the bladder and bowel. A low transverse uterine incision was made with the scalpel.  Delivered from       Didi Canela A [[id number]]   Vertex   Cord was clamped, cut and baby B delivered from Vertex.     Sherrie CanelaCiaran CONNOR [[id number]]   This cord was delayed 1 minute prior to duting.  Cord gas and blood obtained. The placenta was expressed intact however uterus everted during extraction.  Was easily reinverted and noted to be noted to be significantly boggy.  The uterus was curretted with a moist lap sponge and pitocin and methergine ordered.  The uterine outline, tubes and ovaries appeared normal. The uterine incision was closed in two layers with a running continuous non locking suture of #1 chromic. The uterus continued to be boggy filling with blood and TXA was ordered as well as Hemabatee.  The uterus continued to balloon and a B sandoval was placed however a rent in ROMERO revealed continued significant bleeding.  Two units of blood ordered and Sharonda system obtained and placed.  The rent in ROMERO was repaired with #1 chromic and the B sandoval  tied down and secured.   Irrigation was performed and counts were correct;  The uterus was replaced into the abdomen and the lateral gutters were irrigated.  The incision was reinspected and and required 2-0 chromic interrupted sutures to obtain  hemostasis.    Intercede was placed over the hysterotomy.  The peritoneum was closed with a running continuous suture of 2-0 Vicryl;  The rectus muscles reapproximated with interrupted sutures of 2-0 Vicryl. The fascia was then reapproximated with running sutures of 0 Vicryl. The subcutaneous layer was irrigated, noted to be hemostatic, and closed with 3-0 plain Gut.  The skin was reapproximated with staples.  The Sharonda system was checked vaginally and the balloon secured outside the external os.      Instrument, sponge, and needle counts were correct prior the abdominal closure and at the conclusion of the case. The patient went to the Recovery Room in stable condition with the worrell draining clear urine.       Selina Curtis MD      2/1/2024  10:06 EST
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
## 2024-02-01 NOTE — ANESTHESIA PREPROCEDURE EVALUATION
Anesthesia Evaluation     Patient summary reviewed and Nursing notes reviewed   NPO Solid Status: > 6 hours  NPO Liquid Status: > 2 hours           Airway   Mallampati: II  TM distance: >3 FB  Neck ROM: full  Dental      Pulmonary    (+) asthma,  Cardiovascular         Neuro/Psych  (+) headaches, psychiatric history Anxiety and Depression  GI/Hepatic/Renal/Endo - negative ROS     Musculoskeletal (-) negative ROS    Abdominal    Substance History - negative use     OB/GYN    (+) Pregnant    Comment: HELLP syndrome      Other - negative ROS                   Anesthesia Plan    ASA 3 - emergent     ITN and spinal       Anesthetic plan, risks, benefits, and alternatives have been provided, discussed and informed consent has been obtained with: patient.    Use of blood products discussed with patient .    Plan discussed with attending.    CODE STATUS:    Level Of Support Discussed With: Patient  Code Status (Patient has no pulse and is not breathing): CPR (Attempt to Resuscitate)  Medical Interventions (Patient has pulse or is breathing): Full Support

## 2024-02-01 NOTE — ANESTHESIA PROCEDURE NOTES
Spinal Block      Patient reassessed immediately prior to procedure    Patient location during procedure: OR  Indication:at surgeon's request  Performed By  CRNA/NAVI: Majo Cardona CRNA  Preanesthetic Checklist  Completed: patient identified, IV checked, site marked, risks and benefits discussed, surgical consent, monitors and equipment checked, pre-op evaluation and timeout performed  Spinal Block Prep:  Patient Position:sitting  Sterile Tech:cap, gloves, mask and sterile barriers  Prep:Betadine  Patient Monitoring:blood pressure monitoring, continuous pulse oximetry and EKG    Spinal Block Procedure  Approach:midline  Guidance:palpation technique  Location:L3-L4  Needle Type:Floyd  Needle Gauge:25 G  Placement of Spinal needle event:cerebrospinal fluid aspirated  Paresthesia: no  Fluid Appearance:clear     Post Assessment  Patient Tolerance:patient tolerated the procedure well with no apparent complications  Complications no

## 2024-02-01 NOTE — PROGRESS NOTES
Labourist:        Shweta reports a worse ing headache this morning despite taking Tylenol for it last night.  Her headache is bad enough that she could not sleep.  She still denies RUQ pain and vision changes.      She still has contractions, but nothing different that when she was admitted.    Her labs show her platelets dropping from 135 to 118 in 6 hours.    LFTs still normal.    Discussed the changes with Dr. Milligan and he agrees that she should be delivered today.   I spoke with Shweta about this and the concerns of her possibly developing HELLP.    She wants to do the safest delivery route possible so is good with a C/S.    Dr. Curtis notified and will proceed with LTCS this morning

## 2024-02-01 NOTE — LACTATION NOTE
02/01/24 1336   Maternal Information   Date of Referral 02/01/24   Person Making Referral lactation consultant   Maternal Reason for Referral no prior breastfeeding experience  (twin boys in NICU)   Infant Reason for Referral NICU admission   Maternal Infant Feeding   Maternal Emotional State other (see comments)  (saddened, teary eyed; mother kindly declined lactation assistance for pumping at this time; would have RN call lactation when ready)   Milk Expression/Equipment   Breast Pump Type double electric, hospital grade  (set pump at bedside; has supplies; LC will address later when patient ready for teaching)

## 2024-02-02 ENCOUNTER — ANESTHESIA EVENT (OUTPATIENT)
Dept: OBSTETRICS AND GYNECOLOGY | Facility: HOSPITAL | Age: 29
End: 2024-02-02
Payer: COMMERCIAL

## 2024-02-02 ENCOUNTER — ANESTHESIA (OUTPATIENT)
Dept: OBSTETRICS AND GYNECOLOGY | Facility: HOSPITAL | Age: 29
End: 2024-02-02
Payer: COMMERCIAL

## 2024-02-02 PROBLEM — O30.033 MONOCHORIONIC DIAMNIOTIC TWIN GESTATION IN THIRD TRIMESTER: Status: RESOLVED | Noted: 2023-08-07 | Resolved: 2024-02-02

## 2024-02-02 PROBLEM — Z34.90 PREGNANCY: Status: RESOLVED | Noted: 2024-01-30 | Resolved: 2024-02-02

## 2024-02-02 PROBLEM — H66.002 NON-RECURRENT ACUTE SUPPURATIVE OTITIS MEDIA OF LEFT EAR WITHOUT SPONTANEOUS RUPTURE OF TYMPANIC MEMBRANE: Status: RESOLVED | Noted: 2023-05-26 | Resolved: 2024-02-02

## 2024-02-02 PROBLEM — O26.879 CERVICAL SHORTENING AFFECTING PREGNANCY: Status: RESOLVED | Noted: 2023-11-30 | Resolved: 2024-02-02

## 2024-02-02 PROBLEM — O60.03 PRETERM LABOR IN THIRD TRIMESTER WITHOUT DELIVERY: Status: RESOLVED | Noted: 2024-01-01 | Resolved: 2024-02-02

## 2024-02-02 PROBLEM — Z30.9 CONTRACEPTIVE MANAGEMENT: Status: RESOLVED | Noted: 2017-06-29 | Resolved: 2024-02-02

## 2024-02-02 PROBLEM — O60.03 PRETERM LABOR IN THIRD TRIMESTER: Status: RESOLVED | Noted: 2024-01-31 | Resolved: 2024-02-02

## 2024-02-02 PROBLEM — Z12.4 ENCOUNTER FOR PAPANICOLAOU SMEAR OF CERVIX: Status: RESOLVED | Noted: 2018-09-19 | Resolved: 2024-02-02

## 2024-02-02 LAB
ALP SERPL-CCNC: 71 U/L (ref 39–117)
ALT SERPL W P-5'-P-CCNC: 8 U/L (ref 1–33)
AST SERPL-CCNC: 25 U/L (ref 1–32)
BH BB BLOOD EXPIRATION DATE: NORMAL
BH BB BLOOD EXPIRATION DATE: NORMAL
BH BB BLOOD TYPE BARCODE: 6200
BH BB BLOOD TYPE BARCODE: 6200
BH BB DISPENSE STATUS: NORMAL
BH BB DISPENSE STATUS: NORMAL
BH BB PRODUCT CODE: NORMAL
BH BB PRODUCT CODE: NORMAL
BH BB UNIT NUMBER: NORMAL
BH BB UNIT NUMBER: NORMAL
BILIRUB SERPL-MCNC: <0.2 MG/DL (ref 0–1.2)
CREAT SERPL-MCNC: 0.55 MG/DL (ref 0.57–1)
CROSSMATCH INTERPRETATION: NORMAL
CROSSMATCH INTERPRETATION: NORMAL
DEPRECATED RDW RBC AUTO: 47.2 FL (ref 37–54)
ERYTHROCYTE [DISTWIDTH] IN BLOOD BY AUTOMATED COUNT: 14.6 % (ref 12.3–15.4)
HCT VFR BLD AUTO: 26.7 % (ref 34–46.6)
HGB BLD-MCNC: 9.7 G/DL (ref 12–15.9)
LDH SERPL-CCNC: 398 U/L (ref 135–214)
MCH RBC QN AUTO: 33 PG (ref 26.6–33)
MCHC RBC AUTO-ENTMCNC: 36.3 G/DL (ref 31.5–35.7)
MCV RBC AUTO: 90.8 FL (ref 79–97)
PLATELET # BLD AUTO: 104 10*3/MM3 (ref 140–450)
PMV BLD AUTO: 11 FL (ref 6–12)
RBC # BLD AUTO: 2.94 10*6/MM3 (ref 3.77–5.28)
UNIT  ABO: NORMAL
UNIT  ABO: NORMAL
UNIT  RH: NORMAL
UNIT  RH: NORMAL
URATE SERPL-MCNC: 6.3 MG/DL (ref 2.4–5.7)
WBC NRBC COR # BLD AUTO: 16.26 10*3/MM3 (ref 3.4–10.8)

## 2024-02-02 PROCEDURE — 83615 LACTATE (LD) (LDH) ENZYME: CPT | Performed by: OBSTETRICS & GYNECOLOGY

## 2024-02-02 PROCEDURE — 84460 ALANINE AMINO (ALT) (SGPT): CPT | Performed by: OBSTETRICS & GYNECOLOGY

## 2024-02-02 PROCEDURE — 82247 BILIRUBIN TOTAL: CPT | Performed by: OBSTETRICS & GYNECOLOGY

## 2024-02-02 PROCEDURE — 82565 ASSAY OF CREATININE: CPT | Performed by: OBSTETRICS & GYNECOLOGY

## 2024-02-02 PROCEDURE — 0503F POSTPARTUM CARE VISIT: CPT | Performed by: OBSTETRICS & GYNECOLOGY

## 2024-02-02 PROCEDURE — 84550 ASSAY OF BLOOD/URIC ACID: CPT | Performed by: OBSTETRICS & GYNECOLOGY

## 2024-02-02 PROCEDURE — 25010000002 KETOROLAC TROMETHAMINE PER 15 MG: Performed by: OBSTETRICS & GYNECOLOGY

## 2024-02-02 PROCEDURE — 85027 COMPLETE CBC AUTOMATED: CPT | Performed by: OBSTETRICS & GYNECOLOGY

## 2024-02-02 PROCEDURE — 84075 ASSAY ALKALINE PHOSPHATASE: CPT | Performed by: OBSTETRICS & GYNECOLOGY

## 2024-02-02 PROCEDURE — 84450 TRANSFERASE (AST) (SGOT): CPT | Performed by: OBSTETRICS & GYNECOLOGY

## 2024-02-02 PROCEDURE — 25010000002 CEFAZOLIN PER 500 MG: Performed by: OBSTETRICS & GYNECOLOGY

## 2024-02-02 RX ORDER — LOPERAMIDE HYDROCHLORIDE 2 MG/1
4 CAPSULE ORAL 4 TIMES DAILY PRN
Status: DISCONTINUED | OUTPATIENT
Start: 2024-02-02 | End: 2024-02-05 | Stop reason: HOSPADM

## 2024-02-02 RX ADMIN — OXYCODONE HYDROCHLORIDE 5 MG: 5 TABLET ORAL at 20:46

## 2024-02-02 RX ADMIN — IBUPROFEN 600 MG: 600 TABLET, FILM COATED ORAL at 16:45

## 2024-02-02 RX ADMIN — CALCIUM CARBONATE (ANTACID) CHEW TAB 500 MG 1 TABLET: 500 CHEW TAB at 07:47

## 2024-02-02 RX ADMIN — OXYCODONE HYDROCHLORIDE 10 MG: 10 TABLET ORAL at 16:45

## 2024-02-02 RX ADMIN — KETOROLAC TROMETHAMINE 15 MG: 15 INJECTION, SOLUTION INTRAMUSCULAR; INTRAVENOUS at 05:27

## 2024-02-02 RX ADMIN — PRENATAL VITAMINS-IRON FUMARATE 27 MG IRON-FOLIC ACID 0.8 MG TABLET 1 TABLET: at 08:13

## 2024-02-02 RX ADMIN — HYDROCORTISONE 2.5% 1 APPLICATION: 25 CREAM TOPICAL at 05:40

## 2024-02-02 RX ADMIN — ACETAMINOPHEN 650 MG: 325 TABLET ORAL at 14:00

## 2024-02-02 RX ADMIN — LOPERAMIDE HYDROCHLORIDE 4 MG: 2 CAPSULE ORAL at 10:26

## 2024-02-02 RX ADMIN — ACETAMINOPHEN 1000 MG: 500 TABLET ORAL at 01:19

## 2024-02-02 RX ADMIN — ACETAMINOPHEN 650 MG: 325 TABLET ORAL at 20:20

## 2024-02-02 RX ADMIN — OXYCODONE HYDROCHLORIDE 5 MG: 5 TABLET ORAL at 14:50

## 2024-02-02 RX ADMIN — OXYCODONE HYDROCHLORIDE 5 MG: 5 TABLET ORAL at 07:47

## 2024-02-02 RX ADMIN — SODIUM CHLORIDE 2000 MG: 900 INJECTION INTRAVENOUS at 03:27

## 2024-02-02 RX ADMIN — ACETAMINOPHEN 1000 MG: 500 TABLET ORAL at 08:13

## 2024-02-02 RX ADMIN — IBUPROFEN 600 MG: 600 TABLET, FILM COATED ORAL at 23:07

## 2024-02-02 RX ADMIN — KETOROLAC TROMETHAMINE 15 MG: 15 INJECTION, SOLUTION INTRAMUSCULAR; INTRAVENOUS at 11:00

## 2024-02-02 RX ADMIN — SODIUM CHLORIDE 2000 MG: 900 INJECTION INTRAVENOUS at 11:38

## 2024-02-02 NOTE — PROGRESS NOTES
Postpartum Progress Note    Patient name: Shweta Canela  YOB: 1995   MRN: [id number]  Referring Provider: Esther Ambriz MD  Admission Date: 2024  Date of Service: 2024    ID: 28 y.o.     Diagnosis:   S/p  delivery 1 Day Post-Op      labor in third trimester       Subjective:      No complaints.  Moderate lochia.   BP well controlled without antihypertensives.   Doing well on magnesium  Ambulating, voiding, tolerating diet.  Pain well controlled.  The patient is currently breastfeeding.   Baby boys in NICU    Objective:      Vital signs:  Vital Signs Range for the last 24 hours  Temperature: Temp:  [97.8 °F (36.6 °C)-98.5 °F (36.9 °C)] 98 °F (36.7 °C)   Temp Source: Temp src: Oral   BP: BP: ()/() 124/72   Pulse: Heart Rate:  [] 90   Respirations: Resp:  [16-18] 18   Weight: 78 kg (172 lb)     General: Alert & oriented x4, in no apparent distress  Abdomen: soft, nontender  Uterus: firm, nontender  Incision: clean, dry, intact, dressing clean, sutures  Extremities: nontender; no edema      Labs:  Lab Results   Component Value Date    WBC 16.26 (H) 2024    HGB 9.7 (L) 2024    HCT 26.7 (L) 2024    MCV 90.8 2024     (L) 2024     Results from last 7 days   Lab Units 24   ABO TYPING  A   RH TYPING  Positive     External Prenatal Results       Pregnancy Outside Results - Transcribed From Office Records - See Scanned Records For Details       Test Value Date Time    ABO  A  24    Rh  Positive  24    Antibody Screen  Negative  24       Negative  24       Negative  23 1245       Negative  23 0450       Negative  23       Negative  23 1133    Varicella IgG       Rubella  6.59 index 23 1133    Hgb  9.7 g/dL 24 0617       10.8 g/dL 24       13.1 g/dL 24 1121       10.4 g/dL 24 0438       12.4 g/dL  24       11.6 g/dL 24       11.5 g/dL 23 1245       10.3 g/dL 23 0449       9.8 g/dL 23 0353       10.6 g/dL 23 1850       11.5 g/dL 23        13.0 g/dL 23 1133    Hct  26.7 % 24 0617       30.0 % 24       37.1 % 24 1121       29.3 % 24 0438       34.4 % 24       32.3 % 24       33.1 % 23 1245       30.3 % 239       28.5 % 233       30.2 % 230       32.8 % 23        37.3 % 23 1133    Glucose Fasting GTT       Glucose Tolerance Test 1 hour       Glucose Tolerance Test 3 hour       Gonorrhea (discrete)  Negative  23 1133    Chlamydia (discrete)  Negative  23 1133    RPR  Non Reactive  23 1133    VDRL       Syphilis Antibody       HBsAg  Negative  23 1133    Herpes Simplex Virus PCR       Herpes Simplex VIrus Culture       HIV  Non Reactive  23 1133    Hep C RNA Quant PCR       Hep C Antibody  Non Reactive  23 1133    AFP       Group B Strep       GBS Susceptibility to Clindamycin       GBS Susceptibility to Erythromycin       Fetal Fibronectin  Negative  23 1354    Genetic Testing, Maternal Blood                 Drug Screening       Test Value Date Time    Urine Drug Screen       Amphetamine Screen  Negative ng/mL 23 1133    Barbiturate Screen  Negative ng/mL 23 1133    Benzodiazepine Screen  Negative ng/mL 23 1133    Methadone Screen  Negative ng/mL 23 1133    Phencyclidine Screen  Negative ng/mL 23 1133    Opiates Screen       THC Screen       Cocaine Screen       Propoxyphene Screen  Negative ng/mL 23 1133    Buprenorphine Screen       Methamphetamine Screen       Oxycodone Screen       Tricyclic Antidepressants Screen                 Legend    ^: Historical                            Assessment/Plan:      1 Day Post-Op s/p Procedure(s):   SECTION PRIMARY  1. S/p   delivery: Continue postoperative care.  Doing well.  2. Infant feeding: Supportive care.  The patient is currently breastfeeding and pumping while in NICU  3.  Severe preeclampsia: Will DC magnesium sulfate at 2pm and send to PPU. Continue to monitor labs daily.   4. Contraception: Education given regarding options for contraception, including barrier methods, injectable contraception, IUD placement, oral contraceptives.    Aristeo Lua MD  02/02/24

## 2024-02-02 NOTE — PROGRESS NOTES
Minimal output from Sharonda.  Removed from suction and deflated.  Removed 30 minutes after easily.    Good UOP.    Repeat labs now and in am

## 2024-02-02 NOTE — ANESTHESIA POSTPROCEDURE EVALUATION
Patient: Shweta Canela    Procedure Summary       Date: 24 Room / Location:  [facility]    Anesthesia Start: 814 Anesthesia Stop: 955    Procedure:  SECTION PRIMARY (Abdomen) Diagnosis:     Surgeons: Selina Curtis MD Provider: Jesse Amanda DO    Anesthesia Type: ITN, spinal ASA Status: 3 - Emergent            Anesthesia Type: ITN, spinal    Vitals  Vitals Value Taken Time   /69 24 0841   Temp 98.3 °F (36.8 °C) 24 0738   Pulse 96 24 0841   Resp 18 24 0841   SpO2 99 % 24 0841           Post Anesthesia Care and Evaluation    Patient location during evaluation: bedside  Patient participation: complete - patient participated  Level of consciousness: awake and alert  Pain management: adequate    Airway patency: patent  Anesthetic complications: No anesthetic complications    Cardiovascular status: acceptable  Respiratory status: acceptable  Hydration status: acceptable  Post Neuraxial Block status: Motor and sensory function returned to baseline and No signs or symptoms of PDPH

## 2024-02-02 NOTE — LACTATION NOTE
02/02/24 1200   Maternal Information   Date of Referral 02/02/24   Person Making Referral lactation consultant   Maternal Reason for Referral no prior breastfeeding experience  (courtesy visit for new twin delivery)   Infant Reason for Referral NICU admission   Maternal Assessment   Breast Size Issue none   Breast Shape Bilateral:;round   Breast Density Bilateral:;soft   Nipples Bilateral:;everted   Left Nipple Symptoms intact;nontender   Right Nipple Symptoms intact;nontender   Maternal Infant Feeding   Maternal Emotional State receptive;relaxed  (FOB at bedside & very attentive.)   Support Person Involvement actively supporting mother   Milk Expression/Equipment   Breast Pump Type double electric, hospital grade  (set up hosp pump & explained how to use, freq of use & how to clean it. Pt & FOB state understanding.)   Breast Pumping   Breast Pumping Interventions   (NICU breastfeeding educ packet reviewed.)

## 2024-02-03 RX ADMIN — OXYCODONE HYDROCHLORIDE 5 MG: 5 TABLET ORAL at 20:20

## 2024-02-03 RX ADMIN — OXYCODONE HYDROCHLORIDE 10 MG: 10 TABLET ORAL at 01:29

## 2024-02-03 RX ADMIN — IBUPROFEN 600 MG: 600 TABLET, FILM COATED ORAL at 10:51

## 2024-02-03 RX ADMIN — PRENATAL VITAMINS-IRON FUMARATE 27 MG IRON-FOLIC ACID 0.8 MG TABLET 1 TABLET: at 10:52

## 2024-02-03 RX ADMIN — ACETAMINOPHEN 650 MG: 325 TABLET ORAL at 01:31

## 2024-02-03 RX ADMIN — DOCUSATE SODIUM 100 MG: 100 CAPSULE, LIQUID FILLED ORAL at 10:51

## 2024-02-03 RX ADMIN — IBUPROFEN 600 MG: 600 TABLET, FILM COATED ORAL at 23:28

## 2024-02-03 RX ADMIN — ACETAMINOPHEN 650 MG: 325 TABLET ORAL at 20:20

## 2024-02-03 RX ADMIN — IBUPROFEN 600 MG: 600 TABLET, FILM COATED ORAL at 18:22

## 2024-02-03 RX ADMIN — ACETAMINOPHEN 650 MG: 325 TABLET ORAL at 14:06

## 2024-02-03 RX ADMIN — IBUPROFEN 600 MG: 600 TABLET, FILM COATED ORAL at 05:37

## 2024-02-03 NOTE — PROGRESS NOTES
2/3/2024    Name:Shweta Canela    MR#:[id number]     PROGRESS NOTE:  Post-Op 2 S/P    HD:3    Subjective   28 y.o. yo Female  s/p CS at 33w5d doing well. Pain well controlled. Tolerating regular diet and having flatus. Lochia normal.   She denies headache, visual changes, dizziness, sob.      Postpartum care following C/S 24 - boys x 2       Objective    Vitals  Temp:  Temp:  [97.8 °F (36.6 °C)-99.2 °F (37.3 °C)] 98.1 °F (36.7 °C)  Temp src: Oral  BP:  BP: (109-138)/(68-88) 124/88  Pulse:  Heart Rate:  [] 88  RR:   Resp:  [16-18] 18    General Awake, alert, no distress  Abdomen Soft, non-distended, fundus firm, below umbilicus, appropriately tender  Incision  Intact, no erythema or exudate  Extremities Calves NT bilaterally     I/O last 3 completed shifts:  In: 2373.8 [P.O.:358; I.V.:2015.8]  Out: 5450 [Urine:5450]    LABS:   Lab Results   Component Value Date    WBC 16.26 (H) 2024    HGB 9.7 (L) 2024    HCT 26.7 (L) 2024    MCV 90.8 2024     (L) 2024       Infant:      Didi Canlea [[id number]]   male      Didi Canela [[id number]]   male       Assessment   1.  POD 2, doing well   2.  Preeclampsia s/p Mag (dc'd  @ 1133)-- BP wnl on no meds, PEP wnl 2/, asymptomatic   3.  Baby boys x2 stable in NICU   4.  PPH--- hemodynamically stable    Plan: Routine postoperative care      Active Problems:   None      Chitra Vega, CORRINE  2/3/2024 10:33 EST

## 2024-02-03 NOTE — LACTATION NOTE
02/03/24 1550   Maternal Information   Date of Referral 02/03/24   Person Making Referral lactation consultant  (Follow-up consult)   Maternal Reason for Referral no prior breastfeeding experience   Infant Reason for Referral NICU admission  (33 5/7-week twins in the NICU)   Milk Expression/Equipment   Breast Pump Type double electric, hospital grade;double electric, personal  (Pump was offered early after delivery.  Mom was not able to pump because she felt too sick.  Now pumping about Q3 hours.  Getting wet on flange but not enough to collect.  Has personalLansinoh breast pump through insurance to use when babies go home)   Breast Pumping   Breast Pumping Interventions early pumping promoted;frequent pumping encouraged  (encouraged to continue pumping every 3 hours to get best milk supply possible.  Discussed pump for preemie pump lung program to discuss with NICU before mom is discharged home.)

## 2024-02-04 RX ADMIN — IBUPROFEN 600 MG: 600 TABLET, FILM COATED ORAL at 22:18

## 2024-02-04 RX ADMIN — IBUPROFEN 600 MG: 600 TABLET, FILM COATED ORAL at 11:15

## 2024-02-04 RX ADMIN — ACETAMINOPHEN 650 MG: 325 TABLET ORAL at 13:31

## 2024-02-04 RX ADMIN — ACETAMINOPHEN 650 MG: 325 TABLET ORAL at 02:00

## 2024-02-04 RX ADMIN — DOCUSATE SODIUM 100 MG: 100 CAPSULE, LIQUID FILLED ORAL at 08:10

## 2024-02-04 RX ADMIN — ACETAMINOPHEN 650 MG: 325 TABLET ORAL at 08:09

## 2024-02-04 RX ADMIN — IBUPROFEN 600 MG: 600 TABLET, FILM COATED ORAL at 05:09

## 2024-02-04 RX ADMIN — IBUPROFEN 600 MG: 600 TABLET, FILM COATED ORAL at 16:47

## 2024-02-04 RX ADMIN — PRENATAL VITAMINS-IRON FUMARATE 27 MG IRON-FOLIC ACID 0.8 MG TABLET 1 TABLET: at 08:09

## 2024-02-04 RX ADMIN — ACETAMINOPHEN 650 MG: 325 TABLET ORAL at 20:14

## 2024-02-04 NOTE — PLAN OF CARE
Goal Outcome Evaluation:  Plan of Care Reviewed With: patient        Progress: improving  Outcome Evaluation: Vital signs WDL, pain well controlled with ERAS meds, denies headache or visual changes, lochia WDL, obtaining 1.5ml colustrum with pumping

## 2024-02-04 NOTE — PROGRESS NOTES
2024    Name:Shweta Canela    MR#:[id number]     PROGRESS NOTE:  Post-Op 3 S/P    HD:4    Subjective   28 y.o. yo Female  s/p CS at 33w5d doing well. Pain well controlled. Tolerating regular diet and having flatus. Lochia normal.  She denies headache, visual changes.      Postpartum care following C/S 24 - boys x 2       Objective    Vitals  Temp:  Temp:  [98.1 °F (36.7 °C)-98.8 °F (37.1 °C)] 98.2 °F (36.8 °C)  Temp src: Oral  BP:  BP: (121-139)/(77-87) 127/78  Pulse:  Heart Rate:  [78-88] 78  RR:   Resp:  [16-18] 16    General Awake, alert, no distress  Abdomen Soft, non-distended, fundus firm, below umbilicus, appropriately tender  Incision  Intact, no erythema or exudate  Extremities Calves NT bilaterally     I/O last 3 completed shifts:  In: -   Out: 4075 [Urine:4075]    LABS:   Lab Results   Component Value Date    WBC 16.26 (H) 2024    HGB 9.7 (L) 2024    HCT 26.7 (L) 2024    MCV 90.8 2024     (L) 2024       Infant:      Didi Canela [[id number]]   male      Didi Canela [[id number]]   male       Assessment   1.  POD 3, doing well   2.  Preeclampsia s/p Mag (dc'd  @1133)--- BP wnl on no meds, PEP wnl 2/2, asymptomatic   3.  Baby boys x2 stable in NICU   4.  PPH--- hemodynamically stable and VSS    Plan:  Routine postoperative care.  Anticipate dc tomorrow.      Active Problems:   None      Chitra Vega, APRN  2024 10:28 EST

## 2024-02-05 VITALS
HEART RATE: 63 BPM | WEIGHT: 172 LBS | OXYGEN SATURATION: 99 % | RESPIRATION RATE: 16 BRPM | TEMPERATURE: 98 F | BODY MASS INDEX: 32.47 KG/M2 | DIASTOLIC BLOOD PRESSURE: 81 MMHG | HEIGHT: 61 IN | SYSTOLIC BLOOD PRESSURE: 142 MMHG

## 2024-02-05 LAB
CYTO UR: NORMAL
LAB AP CASE REPORT: NORMAL
LAB AP CLINICAL INFORMATION: NORMAL
PATH REPORT.FINAL DX SPEC: NORMAL
PATH REPORT.GROSS SPEC: NORMAL

## 2024-02-05 PROCEDURE — 0503F POSTPARTUM CARE VISIT: CPT | Performed by: ADVANCED PRACTICE MIDWIFE

## 2024-02-05 RX ORDER — OXYCODONE HYDROCHLORIDE 5 MG/1
5 TABLET ORAL EVERY 6 HOURS PRN
Qty: 12 TABLET | Refills: 0 | Status: SHIPPED | OUTPATIENT
Start: 2024-02-05 | End: 2024-02-08

## 2024-02-05 RX ORDER — PSEUDOEPHEDRINE HCL 30 MG
100 TABLET ORAL 2 TIMES DAILY PRN
Qty: 60 CAPSULE | Refills: 0 | Status: SHIPPED | OUTPATIENT
Start: 2024-02-05

## 2024-02-05 RX ORDER — IBUPROFEN 600 MG/1
600 TABLET ORAL EVERY 6 HOURS
Qty: 120 TABLET | Refills: 0 | Status: SHIPPED | OUTPATIENT
Start: 2024-02-05

## 2024-02-05 RX ADMIN — DOCUSATE SODIUM 100 MG: 100 CAPSULE, LIQUID FILLED ORAL at 08:40

## 2024-02-05 RX ADMIN — ACETAMINOPHEN 650 MG: 325 TABLET ORAL at 08:40

## 2024-02-05 RX ADMIN — PRENATAL VITAMINS-IRON FUMARATE 27 MG IRON-FOLIC ACID 0.8 MG TABLET 1 TABLET: at 08:40

## 2024-02-05 RX ADMIN — IBUPROFEN 600 MG: 600 TABLET, FILM COATED ORAL at 05:57

## 2024-02-05 RX ADMIN — ACETAMINOPHEN 650 MG: 325 TABLET ORAL at 02:49

## 2024-02-05 NOTE — PLAN OF CARE
Goal Outcome Evaluation:   D/C home planned today. Following up with Dr. Curtis in 2 weeks. Pain well controlled and VSS. B/Ps below parameters.  Pt ambulating well to NICU and back to see her babies.

## 2024-02-05 NOTE — DISCHARGE SUMMARY
Discharge Summary    Date of Admission: 2024  Date of Discharge:  2024      Patient: Shweta Canela      MR#:[id number]    Primary Surgeon/OB: Selina Curtis MD    Discharge Surgeon/OB:    Presenting Problem/History of Present Illness   labor in third trimester [O60.03]       Postpartum care following C/S 24 - boys x 2    Third-stage postpartum hemorrhage    Hypertension in pregnancy, preeclampsia, severe, delivered         Discharge Diagnosis:  section at 33w5d    Procedures:     Didi Canela [[id number]]   , Low Transverse      Didi Canela B [[id number]]   , Low Transverse        Didi Canela [[id number]]   2024      Didi Canela [[id number]]   2024       Didi Canela [[id number]]   8:36 AM      Didi Canela [[id number]]   8:37 AM      Rh Immune globulin given: not applicable    Discharge Date: 2024; Discharge Time: 10:00 EST    Early Discharge:  NO    Hospital Course  Patient is a 28 y.o. female  at 33w5d status post  section with postpartum hemorrhage which was controlled with pitocin, methergine, TXA, and hemabate, as well as B sandoval stitch and JESSICA. She received 2u PRBC in OR. She also had Preeclampsia with severe features. She received magnesium postpartum.  Patient was advanced to regular diet on postoperative day#1.  On discharge, ambulating, tolerating a regular diet without any difficulties and her incision is dry, clean and intact.     Infant:        Didi Canela [[id number]]   male      Didi Canela [[id number]]   male  fetus      Didi Canela [[id number]]   2170 g (4 lb 12.5 oz)      Didi Canela [[id number]]   2180 g (4 lb 12.9 oz)  with Apgar scores of      Didi Canela [[id number]]   8      Didi Canela [[id number]]   8 ,      Didi Canela [[id number]]   9      Didi Canela  [[id number]]   9  at five minutes.    Condition on Discharge:  Stable    Vital Signs  Temp:  [98 °F (36.7 °C)-98.7 °F (37.1 °C)] 98 °F (36.7 °C)  Heart Rate:  [63-93] 63  Resp:  [16-18] 16  BP: (119-148)/(67-93) 142/81    Lab Results   Component Value Date    WBC 16.26 (H) 02/02/2024    HGB 9.7 (L) 02/02/2024    HCT 26.7 (L) 02/02/2024    MCV 90.8 02/02/2024     (L) 02/02/2024       Discharge Disposition  Home or Self Care    Discharge Medications     Discharge Medications        New Medications        Instructions Start Date   docusate sodium 100 MG capsule   100 mg, Oral, 2 Times Daily PRN      ibuprofen 600 MG tablet  Commonly known as: ADVIL,MOTRIN   600 mg, Oral, Every 6 Hours      oxyCODONE 5 MG immediate release tablet  Commonly known as: ROXICODONE   5 mg, Oral, Every 6 Hours PRN             Continue These Medications        Instructions Start Date   prenatal (CLASSIC) vitamin 28-0.8 MG tablet tablet  Generic drug: prenatal vitamin   Oral, Daily             Stop These Medications      acetaminophen 325 MG tablet  Commonly known as: TYLENOL     aspirin 81 MG EC tablet     benzocaine-menthol 20-0.5 % aerosol topical spray  Commonly known as: DERMOPLAST     ciprofloxacin-dexAMETHasone 0.3-0.1 % otic suspension  Commonly known as: CIPRODEX     famotidine 20 MG tablet  Commonly known as: Pepcid     Hydrocortisone (Perianal) 2.5 % rectal cream  Commonly known as: ANUSOL-HC     pantoprazole 20 MG EC tablet  Commonly known as: Protonix              Discharge Diet:     Activity at Discharge:   Activity Instructions       Activity as Tolerated      Other Instructions (Specify)      No driving for 2 weeks, No lifting over 10lbs for 6 weeks.    Pelvic Rest      Pelvic rest including no tampons, no tub baths, and no intercourse until 6 weeks/cleared by Provider.            Follow-up Appointments  No future appointments.  Additional Instructions for the Follow-ups that You Need to Schedule       Call MD for  problems / concerns.   As directed      Call with foul smelling discharge, fever of >100.4, signs of postpartum depression, saturating a pad in <1 hour, blood clots larger than a golf ball. Also, call with headache that does not resolve with medication or rest, vision changes, pain in right upper quadrant, worsening swelling, or BP >140/90 if able to monitor at home.    Order Comments: Call with foul smelling discharge, fever of >100.4, signs of postpartum depression, saturating a pad in <1 hour, blood clots larger than a golf ball. Also, call with headache that does not resolve with medication or rest, vision changes, pain in right upper quadrant, worsening swelling, or BP >140/90 if able to monitor at home.         Discharge Follow-up with Specialty: Nurse practitioner in Dr. Graves's office; 2 Days   As directed      Specialty: Nurse practitioner in Dr. Graves's office   Follow Up: 2 Days   Follow Up Details: BP check                CORRINE Fulton  02/05/24  10:00 EST  Csd

## 2024-02-06 ENCOUNTER — TELEPHONE (OUTPATIENT)
Dept: OBSTETRICS AND GYNECOLOGY | Facility: CLINIC | Age: 29
End: 2024-02-06
Payer: OTHER GOVERNMENT

## 2024-02-06 ENCOUNTER — HOSPITAL ENCOUNTER (OUTPATIENT)
Facility: HOSPITAL | Age: 29
Setting detail: OBSERVATION
Discharge: HOME | End: 2024-02-07
Attending: OBSTETRICS & GYNECOLOGY | Admitting: OBSTETRICS & GYNECOLOGY
Payer: COMMERCIAL

## 2024-02-06 ENCOUNTER — POSTPARTUM VISIT (OUTPATIENT)
Dept: OBSTETRICS AND GYNECOLOGY | Facility: CLINIC | Age: 29
End: 2024-02-06
Payer: COMMERCIAL

## 2024-02-06 VITALS
HEIGHT: 61 IN | HEART RATE: 82 BPM | RESPIRATION RATE: 16 BRPM | OXYGEN SATURATION: 100 % | DIASTOLIC BLOOD PRESSURE: 82 MMHG | SYSTOLIC BLOOD PRESSURE: 130 MMHG | TEMPERATURE: 98.8 F | WEIGHT: 150 LBS | BODY MASS INDEX: 28.32 KG/M2

## 2024-02-06 DIAGNOSIS — Z01.30 BLOOD PRESSURE CHECK: ICD-10-CM

## 2024-02-06 DIAGNOSIS — R07.89 CHEST TIGHTNESS: ICD-10-CM

## 2024-02-06 LAB
ALBUMIN SERPL-MCNC: 3.3 G/DL (ref 3.5–5.2)
ALBUMIN/GLOB SERPL: 1.2 G/DL
ALP SERPL-CCNC: 107 U/L (ref 39–117)
ALT SERPL W P-5'-P-CCNC: 44 U/L (ref 1–33)
ANION GAP SERPL CALCULATED.3IONS-SCNC: 8 MMOL/L (ref 5–15)
AST SERPL-CCNC: 26 U/L (ref 1–32)
BASOPHILS # BLD AUTO: 0.03 10*3/MM3 (ref 0–0.2)
BASOPHILS NFR BLD AUTO: 0.3 % (ref 0–1.5)
BILIRUB SERPL-MCNC: 0.2 MG/DL (ref 0–1.2)
BUN SERPL-MCNC: 9 MG/DL (ref 6–20)
BUN/CREAT SERPL: 15.5 (ref 7–25)
CALCIUM SPEC-SCNC: 9.2 MG/DL (ref 8.6–10.5)
CHLORIDE SERPL-SCNC: 105 MMOL/L (ref 98–107)
CO2 SERPL-SCNC: 27 MMOL/L (ref 22–29)
CREAT SERPL-MCNC: 0.58 MG/DL (ref 0.57–1)
DEPRECATED RDW RBC AUTO: 49.4 FL (ref 37–54)
EGFRCR SERPLBLD CKD-EPI 2021: 127.4 ML/MIN/1.73
EOSINOPHIL # BLD AUTO: 0.25 10*3/MM3 (ref 0–0.4)
EOSINOPHIL NFR BLD AUTO: 2.6 % (ref 0.3–6.2)
ERYTHROCYTE [DISTWIDTH] IN BLOOD BY AUTOMATED COUNT: 14.6 % (ref 12.3–15.4)
GEN 5 2HR TROPONIN T REFLEX: 9 NG/L
GLOBULIN UR ELPH-MCNC: 2.7 GM/DL
GLUCOSE SERPL-MCNC: 106 MG/DL (ref 65–99)
HCT VFR BLD AUTO: 26.3 % (ref 34–46.6)
HGB BLD-MCNC: 9.1 G/DL (ref 12–15.9)
IMM GRANULOCYTES # BLD AUTO: 0.37 10*3/MM3 (ref 0–0.05)
IMM GRANULOCYTES NFR BLD AUTO: 3.9 % (ref 0–0.5)
LDH SERPL-CCNC: 221 U/L (ref 135–214)
LYMPHOCYTES # BLD AUTO: 2.23 10*3/MM3 (ref 0.7–3.1)
LYMPHOCYTES NFR BLD AUTO: 23.5 % (ref 19.6–45.3)
MCH RBC QN AUTO: 33.2 PG (ref 26.6–33)
MCHC RBC AUTO-ENTMCNC: 34.6 G/DL (ref 31.5–35.7)
MCV RBC AUTO: 96 FL (ref 79–97)
MONOCYTES # BLD AUTO: 0.89 10*3/MM3 (ref 0.1–0.9)
MONOCYTES NFR BLD AUTO: 9.4 % (ref 5–12)
NEUTROPHILS NFR BLD AUTO: 5.71 10*3/MM3 (ref 1.7–7)
NEUTROPHILS NFR BLD AUTO: 60.3 % (ref 42.7–76)
NRBC BLD AUTO-RTO: 0.4 /100 WBC (ref 0–0.2)
NT-PROBNP SERPL-MCNC: 273.5 PG/ML (ref 0–450)
PLATELET # BLD AUTO: 219 10*3/MM3 (ref 140–450)
PMV BLD AUTO: 9.3 FL (ref 6–12)
POTASSIUM SERPL-SCNC: 3.5 MMOL/L (ref 3.5–5.2)
PROT SERPL-MCNC: 6 G/DL (ref 6–8.5)
RBC # BLD AUTO: 2.74 10*6/MM3 (ref 3.77–5.28)
SODIUM SERPL-SCNC: 140 MMOL/L (ref 136–145)
TROPONIN T DELTA: 1 NG/L
TROPONIN T SERPL HS-MCNC: 8 NG/L
WBC NRBC COR # BLD AUTO: 9.48 10*3/MM3 (ref 3.4–10.8)

## 2024-02-06 PROCEDURE — 80053 COMPREHEN METABOLIC PANEL: CPT | Performed by: OBSTETRICS & GYNECOLOGY

## 2024-02-06 PROCEDURE — 93010 ELECTROCARDIOGRAM REPORT: CPT | Performed by: INTERNAL MEDICINE

## 2024-02-06 PROCEDURE — 85025 COMPLETE CBC W/AUTO DIFF WBC: CPT | Performed by: OBSTETRICS & GYNECOLOGY

## 2024-02-06 PROCEDURE — 99221 1ST HOSP IP/OBS SF/LOW 40: CPT | Performed by: OBSTETRICS & GYNECOLOGY

## 2024-02-06 PROCEDURE — 93005 ELECTROCARDIOGRAM TRACING: CPT | Performed by: OBSTETRICS & GYNECOLOGY

## 2024-02-06 PROCEDURE — G0378 HOSPITAL OBSERVATION PER HR: HCPCS

## 2024-02-06 PROCEDURE — 83880 ASSAY OF NATRIURETIC PEPTIDE: CPT | Performed by: OBSTETRICS & GYNECOLOGY

## 2024-02-06 PROCEDURE — 83615 LACTATE (LD) (LDH) ENZYME: CPT | Performed by: OBSTETRICS & GYNECOLOGY

## 2024-02-06 PROCEDURE — G0379 DIRECT REFER HOSPITAL OBSERV: HCPCS

## 2024-02-06 PROCEDURE — 84484 ASSAY OF TROPONIN QUANT: CPT | Performed by: OBSTETRICS & GYNECOLOGY

## 2024-02-06 RX ORDER — IBUPROFEN 600 MG/1
600 TABLET ORAL EVERY 6 HOURS
Status: DISCONTINUED | OUTPATIENT
Start: 2024-02-06 | End: 2024-02-07 | Stop reason: HOSPADM

## 2024-02-06 RX ORDER — NIFEDIPINE 30 MG/1
30 TABLET, EXTENDED RELEASE ORAL EVERY 12 HOURS
Status: DISCONTINUED | OUTPATIENT
Start: 2024-02-06 | End: 2024-02-06

## 2024-02-06 RX ORDER — PRENATAL VIT/IRON FUM/FOLIC AC 27MG-0.8MG
1 TABLET ORAL DAILY
Status: DISCONTINUED | OUTPATIENT
Start: 2024-02-06 | End: 2024-02-07 | Stop reason: HOSPADM

## 2024-02-06 RX ORDER — OXYCODONE HYDROCHLORIDE 5 MG/1
5 TABLET ORAL EVERY 6 HOURS PRN
Status: DISCONTINUED | OUTPATIENT
Start: 2024-02-06 | End: 2024-02-07 | Stop reason: HOSPADM

## 2024-02-06 RX ORDER — BUTALBITAL, ACETAMINOPHEN AND CAFFEINE 50; 325; 40 MG/1; MG/1; MG/1
2 TABLET ORAL EVERY 4 HOURS PRN
Status: DISCONTINUED | OUTPATIENT
Start: 2024-02-06 | End: 2024-02-07 | Stop reason: HOSPADM

## 2024-02-06 RX ORDER — NIFEDIPINE 30 MG/1
30 TABLET, EXTENDED RELEASE ORAL
Status: DISCONTINUED | OUTPATIENT
Start: 2024-02-07 | End: 2024-02-07 | Stop reason: HOSPADM

## 2024-02-06 RX ORDER — DOCUSATE SODIUM 100 MG/1
100 CAPSULE, LIQUID FILLED ORAL 2 TIMES DAILY PRN
Status: DISCONTINUED | OUTPATIENT
Start: 2024-02-06 | End: 2024-02-07 | Stop reason: HOSPADM

## 2024-02-06 RX ADMIN — BUTALBITAL, ACETAMINOPHEN, AND CAFFEINE 2 TABLET: 50; 325; 40 TABLET ORAL at 16:49

## 2024-02-06 RX ADMIN — PRENATAL VITAMINS-IRON FUMARATE 27 MG IRON-FOLIC ACID 0.8 MG TABLET 1 TABLET: at 20:24

## 2024-02-06 RX ADMIN — IBUPROFEN 600 MG: 600 TABLET, FILM COATED ORAL at 20:24

## 2024-02-06 RX ADMIN — NIFEDIPINE 30 MG: 30 TABLET, EXTENDED RELEASE ORAL at 18:01

## 2024-02-06 NOTE — PROGRESS NOTES
Chief Complaint   Patient presents with    Postpartum Care     BP Check       Postpartum Visit         Shweta Canela is a 28 y.o.  who presents today for a 5 day(s) postpartum check.     C/S:  twins         Didi Canela [[id number]]   , Low Transverse      Didi Canela [[id number]]   , Low Transverse    Information for the patient's :  Didi Canela [[id number]]   2024   male   Didi Canela   2170 g (4 lb 12.5 oz)   Gestational Age: 33w5d   Information for the patient's :  Didi Canela [[id number]]   2024   male   Didi Canela   2180 g (4 lb 12.9 oz)   Gestational Age: 33w5d        Baby Discharged: Discharged with Mom  Delivering Physician: Selina Curtis MD     Her pregnancy was complicated by:   C/s for twins and severe preeclampsia at 33w5d on 24. States d/c home yesterday and c/o 'constant' headache since yesterday afternoon until this AM. Tylenol eased HA but did not resolve. Chest tightness started last night while lying down trying to go to sleep. Babies are in NICU. Chest tightness/pain in located across upper middle chest. Described as achy, tight, and feels like it's cardio pain. Patient is not currently taking BP meds. States BP at home this 's/80s. Reports sometimes feels like she can't take a deep breath. She denies SOB, pain with deep breathing, upper abd pain, swelling, visual changes.      Patient also with PP hemorrhage. EBL 2100ml, received 2 units of PRBCs. Denies issues with incision or excessive vaginal bleeding/pain.  The incision is healing well. Patient describes vaginal bleeding as moderate.  Patient is breastfeeding.      Patient denies concerns for postpartum depression/anxiety. Patient denies  suicidal or homicidal ideation. Her postpartum depression screening questionnaire: 1, No treatment is indicated. Has been emotional the past few days. Been crying about  "babies being in the hospital and whole delivery did not go as expected. She reports having a history of anxiety years ago. Was on medication but cannot remember the name.     Last Completed Pap Smear       This patient has no relevant Health Maintenance data.              The additional following portions of the patient's history were reviewed and updated as appropriate: allergies, current medications, past family history, past medical history, past social history, past surgical history, and problem list.    Review of Systems   Respiratory:  Positive for chest tightness. Negative for shortness of breath.    Cardiovascular:  Positive for chest pain (Mid chest). Negative for palpitations.   Gastrointestinal:  Positive for abdominal pain (Gas pain). Negative for abdominal distention, constipation and nausea.   Genitourinary:  Positive for vaginal bleeding. Negative for breast discharge, breast lump, breast pain, dysuria, pelvic pain, pelvic pressure, urinary incontinence, vaginal discharge and vaginal pain.        Breastfeeding   Neurological:  Positive for headache (Intermittent. Not relieved with Tylenol. No current HA. From yesterday AM until this AM). Negative for light-headedness.   Psychiatric/Behavioral:  Negative for suicidal ideas and depressed mood. The patient is nervous/anxious (History of taking some sort of medication for anxiety a while ago.).    All other systems reviewed and are negative.    All other systems reviewed and are negative.     I have reviewed and agree with the HPI, ROS, and historical information as entered above. Dania Soliman, APRN      /82   Pulse 82   Temp 98.8 °F (37.1 °C) (Oral)   Resp 16   Ht 154.9 cm (61\")   Wt 68 kg (150 lb)   SpO2 100%   Breastfeeding Yes   BMI 28.34 kg/m²     Physical Exam  Vitals and nursing note reviewed.   Constitutional:       General: She is not in acute distress.     Appearance: Normal appearance. She is normal weight. She is not " ill-appearing, toxic-appearing or diaphoretic.   HENT:      Head: Normocephalic and atraumatic.   Cardiovascular:      Rate and Rhythm: Normal rate and regular rhythm.      Heart sounds: Normal heart sounds.   Pulmonary:      Effort: Pulmonary effort is normal. No respiratory distress.      Breath sounds: Normal breath sounds. No wheezing.   Abdominal:      General: Bowel sounds are normal.      Palpations: Abdomen is soft. There is no mass.      Tenderness: There is abdominal tenderness (Nml post op tenderness).      Hernia: No hernia is present.   Neurological:      Mental Status: She is alert and oriented to person, place, and time.   Psychiatric:         Attention and Perception: Attention normal.         Mood and Affect: Mood is anxious. Mood is not depressed.         Speech: Speech normal.         Behavior: Behavior normal. Behavior is cooperative.         Thought Content: Thought content normal. Thought content does not include homicidal or suicidal ideation. Thought content does not include homicidal or suicidal plan.         Judgment: Judgment normal.             Assessment and Plan    Problem List Items Addressed This Visit       Postpartum care following C/S 24 - boys x 2 - Primary    Relevant Medications    oxyCODONE (ROXICODONE) 5 MG immediate release tablet     Other Visit Diagnoses       Chest tightness        Blood pressure check                S/p , 4 day(s) postpartum.    Chest tightness described as cardiac and headaches not relieved with tylenol or rest.  Vitals stable, PE unremarkable.    Babies doing well.  Breastfeeding going well.  No si/sx of postpartum depression, but signs of post partum blues and anxiety. Potential etiologies of chest tightness reviewed with patient. Symptoms appear to be anxiety driven, but will send patient to ED to rule out other etiologies. Patient encouraged to consider anxiety interventions if cardiac and respiratory causes are ruled out. Interventions  reviewed. She will consider and call if she desires referral or to begin medication. Consulted with Dr Curtis. Offered transport by staff w/ wheelchair. Declined.   Gas pain in lower abdomen. Not taking anything for that. Advised to begin Gas X for relief.   Contraception: contraceptive methods: Abstinence  Return in about 1 week (around 2/13/2024) for PP f/u NP.     Dania Soliman, APRN  02/06/2024
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
## 2024-02-06 NOTE — TELEPHONE ENCOUNTER
. Patient PP from c/s for twins and severe preeclampsia at 33w5d on 24. States d/c home yesterday and c/o 'constant' headache since yesterday afternoon with tightness in her chest. She denies SOB, visual changes.     Patient is not currently taking BP meds. States BP at home this 's/80s. She will be in NICU today around 12 noon to see babies.     Patient also with PP hemorrhage. Denies issues with incision or excessive vaginal bleeding/pain.     Instructed will discuss plan with LOS and cb. Patient is scheduled for f/u with LOS tomorrow.

## 2024-02-06 NOTE — TELEPHONE ENCOUNTER
Spoke with patient. Instructed per LOS reassuring that BP WNL at home but recommends f/u in office today for BP check and evaluation.     Patient zulma. Appointment today at 115pm

## 2024-02-06 NOTE — TELEPHONE ENCOUNTER
Pt is requesting a nurse call because she has tightness in her chest and a headache since her discharge yesterday

## 2024-02-07 VITALS
HEART RATE: 88 BPM | TEMPERATURE: 98.4 F | OXYGEN SATURATION: 100 % | DIASTOLIC BLOOD PRESSURE: 87 MMHG | RESPIRATION RATE: 16 BRPM | SYSTOLIC BLOOD PRESSURE: 140 MMHG

## 2024-02-07 LAB
QT INTERVAL: 352 MS
QTC INTERVAL: 418 MS

## 2024-02-07 PROCEDURE — G0378 HOSPITAL OBSERVATION PER HR: HCPCS

## 2024-02-07 RX ORDER — NIFEDIPINE 30 MG
30 TABLET, EXTENDED RELEASE ORAL
Qty: 60 TABLET | Refills: 3 | Status: SHIPPED | OUTPATIENT
Start: 2024-02-07 | End: 2024-02-13

## 2024-02-07 RX ADMIN — IBUPROFEN 600 MG: 600 TABLET, FILM COATED ORAL at 07:28

## 2024-02-07 RX ADMIN — PRENATAL VITAMINS-IRON FUMARATE 27 MG IRON-FOLIC ACID 0.8 MG TABLET 1 TABLET: at 07:28

## 2024-02-07 NOTE — LACTATION NOTE
02/07/24 0910   Maternal Information   Date of Referral 02/07/24   Person Making Referral patient   Maternal Reason for Referral breast/nipple pain  (Mom's breasts are engorged.)   Infant Reason for Referral other (see comments)  (Twin boys are in the NICU, and Mom has been pumping.)   Maternal Assessment   Breast Size Issue none   Breast Shape Bilateral:;round   Breast Density Bilateral:;engorged  (Mom said breasts have become engorged within the last day.  She said her pumped milk volume is low.)   Nipples Bilateral:;everted   Left Nipple Symptoms intact;nontender   Maternal Infant Feeding   Maternal Emotional State anxious;other (see comments)  (Mom is uncomfortable with engorged, swollen breasts.)   Milk Expression/Equipment   Breast Pump Type double electric, hospital grade;double electric, personal   Breast Pumping   Breast Pumping Interventions frequent pumping encouraged     Mom said her milk volume just picked up within the last day, but her breasts are now engorged and tender, and the milk volume has dropped.  Since her milk volume has dropped, Mom was advised that her swollen breasts have probably squeezed off the milk ducts.  She was encouraged to apply ice for about 15 minutes ahead of pumping and after pumping until the milk volume starts to  again.  She was also advised to continue to take ibuprofen, as ordered. She was encouraged to call the outpatient lactation clinic if she has ongoing problems.  She said she intends to pump and bottle feed only. She was strongly encouraged to continue to pump every 3 hours around the clock.   2018

## 2024-02-07 NOTE — DISCHARGE SUMMARY
Discharge Summary    Date of Admission: 2024  Date of Discharge:  2024      Patient: Shweta Canela      MR#:[id number]    Primary Surgeon/OB: Selina Curtis MD    Discharge Surgeon/OB: Selina Curtis MD    Presenting Problem/History of Present Illness  Postpartum PIH           Discharge Diagnosis:  section at 33w5d    Procedures:     Didi Canela A [[id number]]   , Low Transverse      Didi Canela B [[id number]]   , Low Transverse        Didi Canela [[id number]]   2024      Didi Canela [[id number]]   2024       Didi Canela [[id number]]   8:36 AM      Didi Canela [[id number]]   8:37 AM      Rh Immune globulin given: no      Discharge Date: 2024; Discharge Time: 10:14 EST    Early Discharge:  NO    Hospital Course  Patient is a 28 y.o. female  at 33w5d status post  section with uneventful postoperative recovery.  Patient was advanced to regular diet on postoperative day#1.  On discharge, ambulating, tolerating a regular diet without any difficulties and her incision is dry, clean and intact.     Infant:        Didi Canela [[id number]]   male      Didi Canela [[id number]]   male  fetus      Didi Canela [[id number]]   2170 g (4 lb 12.5 oz)      Didi Canela B [[id number]]   2180 g (4 lb 12.9 oz)  with Apgar scores of      Didi Canela [[id number]]   8      Didi Canela B [[id number]]   8 ,      Didi Canela A [[id number]]   9      Didi Canela [[id number]]   9  at five minutes.    Condition on Discharge:  Stable    Vital Signs  Temp:  [98.1 °F (36.7 °C)-98.8 °F (37.1 °C)] 98.4 °F (36.9 °C)  Heart Rate:  [73-96] 88  Resp:  [16] 16  BP: (110-157)/() 140/87    Lab Results   Component Value Date    WBC 9.48 2024    HGB 9.1 (L) 2024    HCT 26.3 (L) 2024    MCV 96.0 2024     2024        Discharge Disposition      Discharge Medications     Discharge Medications        ASK your doctor about these medications        Instructions Start Date   ibuprofen 600 MG tablet  Commonly known as: ADVIL,MOTRIN   600 mg, Oral, Every 6 Hours      oxyCODONE 5 MG immediate release tablet  Commonly known as: ROXICODONE   5 mg, Oral, Every 6 Hours PRN      prenatal (CLASSIC) vitamin 28-0.8 MG tablet tablet  Generic drug: prenatal vitamin   Oral, Daily      Stool Softener 100 MG capsule  Generic drug: docusate sodium   100 mg, Oral, 2 Times Daily PRN               Discharge Diet: Regular     Activity at Discharge: As tolerated    Follow-up Appointments  Future Appointments   Date Time Provider Department Center   2/13/2024  1:50 PM Selina Curtis MD [facility]         Selina Curtis MD  02/07/24  10:14 EST  [facility]

## 2024-02-08 ENCOUNTER — TELEPHONE (OUTPATIENT)
Dept: OBSTETRICS AND GYNECOLOGY | Facility: CLINIC | Age: 29
End: 2024-02-08
Payer: OTHER GOVERNMENT

## 2024-02-08 RX ORDER — LABETALOL 100 MG/1
100 TABLET, FILM COATED ORAL 2 TIMES DAILY
Qty: 60 TABLET | Refills: 1 | Status: SHIPPED | OUTPATIENT
Start: 2024-02-08

## 2024-02-08 NOTE — TELEPHONE ENCOUNTER
Patient is currently 7 days PP and is now having headaches. Patient thinks its a new BP med she was recently prescribe by Dr. Curtis

## 2024-02-08 NOTE — TELEPHONE ENCOUNTER
Patient of Dr. Curtis; had PCS @ 33w 5d on 2/1/24, Mo/Di twins, severe preeclampsia. Was readmitted 2/6/24 with elevated BP and started on Nifedipine; discharged yesterday. Was given Rx for Nifedipine XL 30 mg, #60, was told to take BID but Rx bottle said once daily.   States she took the 1st dose last night and got a H/A 15-20 minutes later. Sh has hx of migraines but states this H/A feels different. She thinks it is caused by the BP med.   She took Ibuprofen 600 mg last night and again this morning with no relief.   States her BP this morning is 128/87.  She denies any other symptoms or problems. States her babies are doing well in the NICU; she will be in to visit them later today.   Discussed with Dr. Curtis. Will stop Nifedipine and start Labetalol 100 mg BID. Informed patient; she v/u and agreed. She will call if H/A does not resolve. Rx for Labetalol sent to patient's pharmacy.

## 2024-02-13 ENCOUNTER — POSTPARTUM VISIT (OUTPATIENT)
Dept: OBSTETRICS AND GYNECOLOGY | Facility: CLINIC | Age: 29
End: 2024-02-13
Payer: OTHER GOVERNMENT

## 2024-02-13 VITALS
WEIGHT: 144.6 LBS | HEIGHT: 61 IN | SYSTOLIC BLOOD PRESSURE: 116 MMHG | DIASTOLIC BLOOD PRESSURE: 76 MMHG | BODY MASS INDEX: 27.3 KG/M2

## 2024-03-19 ENCOUNTER — POSTPARTUM VISIT (OUTPATIENT)
Dept: OBSTETRICS AND GYNECOLOGY | Facility: CLINIC | Age: 29
End: 2024-03-19
Payer: OTHER GOVERNMENT

## 2024-03-19 VITALS
BODY MASS INDEX: 26.96 KG/M2 | SYSTOLIC BLOOD PRESSURE: 108 MMHG | DIASTOLIC BLOOD PRESSURE: 62 MMHG | WEIGHT: 142.8 LBS | HEIGHT: 61 IN

## 2024-03-19 DIAGNOSIS — K64.4 EXTERNAL HEMORRHOIDS: ICD-10-CM

## 2024-03-19 NOTE — PROGRESS NOTES
Chief Complaint   Patient presents with    Postpartum Care       Postpartum Visit         Shweta Canela is a 28 y.o.  who presents today for a 6 week(s) postpartum check.     C/S:  Preeclampsia with severe features and twin gestation         Nico Canela [[id number]]   , Low Transverse      Kam Canela [[id number]]   , Low Transverse    Information for the patient's :  Nico Canela [[id number]]   2024   male   Nico Canela   2170 g (4 lb 12.5 oz)   Gestational Age: 33w5d   Information for the patient's :  Kam Canela [[id number]]   2024   male   Kam Canela   2180 g (4 lb 12.9 oz)   Gestational Age: 33w5d        Baby Discharged: To NICU: Twin A- 3.5 weeks and Twin B- 4.5 weeks. Patient states that her boys are doing well, but not sleeping much. Patient states that her baby's are grunting frequently and having GI upset. Patient states that she may have to switch formula.   Delivering Physician: Selina Curtis MD    Her pregnancy was complicated by  Preeclampsia with severe features, severe uterine atony at delivery, and PP HTN . The incision is healing well. Patient describes vaginal bleeding as light. Patient states that she has had yellow/red tinged vaginal spotting off an on. Patient is breast and bottle feeding. Patient reports getting clogged ducts frequently. She plans to use condoms  for contraception. Patient does not wish to be on hormonal birth control.    She would like to discuss the following complaints today: Constipation and hemorrhoid. Patient states that she has a BM every day, but reports having to strain with each BM. Patient reports occasional rectal bleeding and rectal pain with BM. Patient states that she weaned off Labetalol 1 week ago. Patient states that her BP are averaging 100s/70s. Patient denies preeclampsia s/s.     Patient denies concerns for postpartum depression/anxiety. Patient denies   "suicidal or homicidal ideation. Her postpartum depression screening questionnaire: 1. No treatment is indicated      Last Pap : 05/2023. Results: negative. HPV: unknown .   Last Completed Pap Smear       This patient has no relevant Health Maintenance data.              The additional following portions of the patient's history were reviewed and updated as appropriate: allergies and current medications.    Review of Systems   Constitutional: Negative.    HENT: Negative.     Eyes: Negative.    Respiratory: Negative.     Cardiovascular: Negative.    Gastrointestinal:  Positive for rectal pain.        Hemorrhoids   Endocrine: Negative.    Genitourinary:  Positive for vaginal bleeding.   Musculoskeletal: Negative.    Skin: Negative.    Allergic/Immunologic: Negative.    Neurological: Negative.    Hematological: Negative.    Psychiatric/Behavioral: Negative.       All other systems reviewed and are negative.     I have reviewed and agree with the HPI, ROS, and historical information as entered above. Selina Curtis MD      /62   Ht 154.9 cm (61\")   Wt 64.8 kg (142 lb 12.8 oz)   Breastfeeding Yes   BMI 26.98 kg/m²     Physical Exam  Vitals and nursing note reviewed. Exam conducted with a chaperone present.   Constitutional:       Appearance: She is well-developed.   HENT:      Head: Normocephalic and atraumatic.   Neck:      Thyroid: No thyroid mass or thyromegaly.   Pulmonary:      Breath sounds: No rhonchi.   Abdominal:      Palpations: Abdomen is soft. Abdomen is not rigid. There is no mass.      Tenderness: There is no abdominal tenderness. There is no guarding.      Hernia: No hernia is present.      Comments: Incision C/D/I   Genitourinary:     Vagina: Normal.      Cervix: Normal.      Uterus: Normal.    Musculoskeletal:      Cervical back: Normal range of motion. No muscular tenderness.   Neurological:      Mental Status: She is alert and oriented to person, place, and time.   Psychiatric:         " Behavior: Behavior normal.             Assessment and Plan    Encounter Diagnoses   Name Primary?    Postpartum exam Yes    External hemorrhoids          S/p , 6 week(s) postpartum.  Doing well.    Return to normal physical activity.  No pelvic restrictions.   No si/sx of postpartum depression  Contraception: contraceptive methods: Condoms  Discussed management of hemorroids.  Return in about 1 year (around 3/19/2025), or if symptoms worsen or fail to improve.     Selina Curtis MD  2024

## 2024-03-22 LAB — REF LAB TEST METHOD: NORMAL

## 2024-10-25 ENCOUNTER — APPOINTMENT (OUTPATIENT)
Dept: ULTRASOUND IMAGING | Facility: HOSPITAL | Age: 29
End: 2024-10-25
Payer: OTHER GOVERNMENT

## 2024-10-25 ENCOUNTER — HOSPITAL ENCOUNTER (OUTPATIENT)
Facility: HOSPITAL | Age: 29
Discharge: HOME | End: 2024-10-27
Attending: EMERGENCY MEDICINE | Admitting: SURGERY
Payer: OTHER GOVERNMENT

## 2024-10-25 ENCOUNTER — ANESTHESIA EVENT (OUTPATIENT)
Dept: PERIOP | Facility: HOSPITAL | Age: 29
End: 2024-10-25
Payer: OTHER GOVERNMENT

## 2024-10-25 DIAGNOSIS — K81.0 ACUTE CHOLECYSTITIS: Primary | ICD-10-CM

## 2024-10-25 DIAGNOSIS — R10.11 ACUTE ABDOMINAL PAIN IN RIGHT UPPER QUADRANT: ICD-10-CM

## 2024-10-25 LAB
ALBUMIN SERPL-MCNC: 4.6 G/DL (ref 3.5–5.2)
ALBUMIN/GLOB SERPL: 1.6 G/DL
ALP SERPL-CCNC: 66 U/L (ref 39–117)
ALT SERPL W P-5'-P-CCNC: 18 U/L (ref 1–33)
ANION GAP SERPL CALCULATED.3IONS-SCNC: 12 MMOL/L (ref 5–15)
AST SERPL-CCNC: 18 U/L (ref 1–32)
BACTERIA UR QL AUTO: ABNORMAL /HPF
BASOPHILS # BLD AUTO: 0.06 10*3/MM3 (ref 0–0.2)
BASOPHILS NFR BLD AUTO: 0.4 % (ref 0–1.5)
BILIRUB SERPL-MCNC: 0.5 MG/DL (ref 0–1.2)
BILIRUB UR QL STRIP: NEGATIVE
BUN SERPL-MCNC: 12 MG/DL (ref 6–20)
BUN/CREAT SERPL: 14.8 (ref 7–25)
CALCIUM SPEC-SCNC: 9.8 MG/DL (ref 8.6–10.5)
CHLORIDE SERPL-SCNC: 102 MMOL/L (ref 98–107)
CLARITY UR: ABNORMAL
CO2 SERPL-SCNC: 23 MMOL/L (ref 22–29)
COLOR UR: YELLOW
CREAT SERPL-MCNC: 0.81 MG/DL (ref 0.57–1)
DEPRECATED RDW RBC AUTO: 40.8 FL (ref 37–54)
EGFRCR SERPLBLD CKD-EPI 2021: 101.5 ML/MIN/1.73
EOSINOPHIL # BLD AUTO: 0.16 10*3/MM3 (ref 0–0.4)
EOSINOPHIL NFR BLD AUTO: 1.1 % (ref 0.3–6.2)
ERYTHROCYTE [DISTWIDTH] IN BLOOD BY AUTOMATED COUNT: 12 % (ref 12.3–15.4)
GLOBULIN UR ELPH-MCNC: 2.8 GM/DL
GLUCOSE SERPL-MCNC: 102 MG/DL (ref 65–99)
GLUCOSE UR STRIP-MCNC: NEGATIVE MG/DL
HCG INTACT+B SERPL-ACNC: <0.1 MIU/ML
HCT VFR BLD AUTO: 40.8 % (ref 34–46.6)
HGB BLD-MCNC: 14.1 G/DL (ref 12–15.9)
HGB UR QL STRIP.AUTO: NEGATIVE
HOLD SPECIMEN: NORMAL
HYALINE CASTS UR QL AUTO: ABNORMAL /LPF
IMM GRANULOCYTES # BLD AUTO: 0.05 10*3/MM3 (ref 0–0.05)
IMM GRANULOCYTES NFR BLD AUTO: 0.3 % (ref 0–0.5)
KETONES UR QL STRIP: ABNORMAL
LEUKOCYTE ESTERASE UR QL STRIP.AUTO: NEGATIVE
LIPASE SERPL-CCNC: 32 U/L (ref 13–60)
LYMPHOCYTES # BLD AUTO: 2.07 10*3/MM3 (ref 0.7–3.1)
LYMPHOCYTES NFR BLD AUTO: 13.8 % (ref 19.6–45.3)
MCH RBC QN AUTO: 32 PG (ref 26.6–33)
MCHC RBC AUTO-ENTMCNC: 34.6 G/DL (ref 31.5–35.7)
MCV RBC AUTO: 92.7 FL (ref 79–97)
MONOCYTES # BLD AUTO: 1.25 10*3/MM3 (ref 0.1–0.9)
MONOCYTES NFR BLD AUTO: 8.3 % (ref 5–12)
NEUTROPHILS NFR BLD AUTO: 11.41 10*3/MM3 (ref 1.7–7)
NEUTROPHILS NFR BLD AUTO: 76.1 % (ref 42.7–76)
NITRITE UR QL STRIP: NEGATIVE
NRBC BLD AUTO-RTO: 0 /100 WBC (ref 0–0.2)
PH UR STRIP.AUTO: 6 [PH] (ref 5–8)
PLATELET # BLD AUTO: 236 10*3/MM3 (ref 140–450)
PMV BLD AUTO: 9.7 FL (ref 6–12)
POTASSIUM SERPL-SCNC: 4.1 MMOL/L (ref 3.5–5.2)
PROT SERPL-MCNC: 7.4 G/DL (ref 6–8.5)
PROT UR QL STRIP: NEGATIVE
RBC # BLD AUTO: 4.4 10*6/MM3 (ref 3.77–5.28)
RBC # UR STRIP: ABNORMAL /HPF
REF LAB TEST METHOD: ABNORMAL
SODIUM SERPL-SCNC: 137 MMOL/L (ref 136–145)
SP GR UR STRIP: 1.01 (ref 1–1.03)
SQUAMOUS #/AREA URNS HPF: ABNORMAL /HPF
UROBILINOGEN UR QL STRIP: ABNORMAL
WBC # UR STRIP: ABNORMAL /HPF
WBC NRBC COR # BLD AUTO: 15 10*3/MM3 (ref 3.4–10.8)
WHOLE BLOOD HOLD COAG: NORMAL
WHOLE BLOOD HOLD SPECIMEN: NORMAL

## 2024-10-25 PROCEDURE — 84702 CHORIONIC GONADOTROPIN TEST: CPT | Performed by: EMERGENCY MEDICINE

## 2024-10-25 PROCEDURE — 96376 TX/PRO/DX INJ SAME DRUG ADON: CPT

## 2024-10-25 PROCEDURE — 25810000003 SODIUM CHLORIDE 0.9 % SOLUTION: Performed by: EMERGENCY MEDICINE

## 2024-10-25 PROCEDURE — 25010000002 MORPHINE PER 10 MG: Performed by: SURGERY

## 2024-10-25 PROCEDURE — G0378 HOSPITAL OBSERVATION PER HR: HCPCS

## 2024-10-25 PROCEDURE — 76705 ECHO EXAM OF ABDOMEN: CPT

## 2024-10-25 PROCEDURE — 25810000003 LACTATED RINGERS PER 1000 ML: Performed by: SURGERY

## 2024-10-25 PROCEDURE — 25010000002 ONDANSETRON PER 1 MG: Performed by: EMERGENCY MEDICINE

## 2024-10-25 PROCEDURE — 25010000002 CEFAZOLIN PER 500 MG: Performed by: EMERGENCY MEDICINE

## 2024-10-25 PROCEDURE — 99285 EMERGENCY DEPT VISIT HI MDM: CPT

## 2024-10-25 PROCEDURE — 25010000002 CEFAZOLIN PER 500 MG: Performed by: SURGERY

## 2024-10-25 PROCEDURE — 96365 THER/PROPH/DIAG IV INF INIT: CPT

## 2024-10-25 PROCEDURE — 81001 URINALYSIS AUTO W/SCOPE: CPT | Performed by: EMERGENCY MEDICINE

## 2024-10-25 PROCEDURE — 25010000002 MORPHINE PER 10 MG: Performed by: EMERGENCY MEDICINE

## 2024-10-25 PROCEDURE — 96375 TX/PRO/DX INJ NEW DRUG ADDON: CPT

## 2024-10-25 PROCEDURE — 80053 COMPREHEN METABOLIC PANEL: CPT | Performed by: EMERGENCY MEDICINE

## 2024-10-25 PROCEDURE — 85025 COMPLETE CBC W/AUTO DIFF WBC: CPT | Performed by: EMERGENCY MEDICINE

## 2024-10-25 PROCEDURE — 93005 ELECTROCARDIOGRAM TRACING: CPT | Performed by: EMERGENCY MEDICINE

## 2024-10-25 PROCEDURE — 83690 ASSAY OF LIPASE: CPT | Performed by: EMERGENCY MEDICINE

## 2024-10-25 RX ORDER — MORPHINE SULFATE 2 MG/ML
2 INJECTION, SOLUTION INTRAMUSCULAR; INTRAVENOUS
Status: DISCONTINUED | OUTPATIENT
Start: 2024-10-25 | End: 2024-10-27 | Stop reason: HOSPADM

## 2024-10-25 RX ORDER — SODIUM CHLORIDE 9 MG/ML
10 INJECTION, SOLUTION INTRAMUSCULAR; INTRAVENOUS; SUBCUTANEOUS AS NEEDED
Status: DISCONTINUED | OUTPATIENT
Start: 2024-10-25 | End: 2024-10-27 | Stop reason: HOSPADM

## 2024-10-25 RX ORDER — PROMETHAZINE HYDROCHLORIDE 12.5 MG/1
12.5 SUPPOSITORY RECTAL EVERY 6 HOURS PRN
Status: DISCONTINUED | OUTPATIENT
Start: 2024-10-25 | End: 2024-10-27 | Stop reason: HOSPADM

## 2024-10-25 RX ORDER — BISACODYL 10 MG
10 SUPPOSITORY, RECTAL RECTAL DAILY PRN
Status: DISCONTINUED | OUTPATIENT
Start: 2024-10-25 | End: 2024-10-27 | Stop reason: HOSPADM

## 2024-10-25 RX ORDER — SIMETHICONE 80 MG
80 TABLET,CHEWABLE ORAL 4 TIMES DAILY PRN
Status: DISCONTINUED | OUTPATIENT
Start: 2024-10-25 | End: 2024-10-27 | Stop reason: HOSPADM

## 2024-10-25 RX ORDER — NALOXONE HCL 0.4 MG/ML
0.4 VIAL (ML) INJECTION
Status: DISCONTINUED | OUTPATIENT
Start: 2024-10-25 | End: 2024-10-27 | Stop reason: HOSPADM

## 2024-10-25 RX ORDER — PROMETHAZINE HYDROCHLORIDE 25 MG/1
25 TABLET ORAL EVERY 6 HOURS PRN
Status: DISCONTINUED | OUTPATIENT
Start: 2024-10-25 | End: 2024-10-26 | Stop reason: SDUPTHER

## 2024-10-25 RX ORDER — SODIUM CHLORIDE 9 MG/ML
40 INJECTION, SOLUTION INTRAVENOUS AS NEEDED
Status: DISCONTINUED | OUTPATIENT
Start: 2024-10-25 | End: 2024-10-27 | Stop reason: HOSPADM

## 2024-10-25 RX ORDER — MORPHINE SULFATE 4 MG/ML
4 INJECTION, SOLUTION INTRAMUSCULAR; INTRAVENOUS ONCE
Status: COMPLETED | OUTPATIENT
Start: 2024-10-25 | End: 2024-10-25

## 2024-10-25 RX ORDER — PROMETHAZINE HYDROCHLORIDE 12.5 MG/1
12.5 TABLET ORAL EVERY 6 HOURS PRN
Status: DISCONTINUED | OUTPATIENT
Start: 2024-10-25 | End: 2024-10-26 | Stop reason: SDUPTHER

## 2024-10-25 RX ORDER — POLYETHYLENE GLYCOL 3350 17 G/17G
17 POWDER, FOR SOLUTION ORAL DAILY PRN
Status: DISCONTINUED | OUTPATIENT
Start: 2024-10-25 | End: 2024-10-27 | Stop reason: HOSPADM

## 2024-10-25 RX ORDER — SODIUM CHLORIDE 0.9 % (FLUSH) 0.9 %
10 SYRINGE (ML) INJECTION AS NEEDED
Status: DISCONTINUED | OUTPATIENT
Start: 2024-10-25 | End: 2024-10-27 | Stop reason: HOSPADM

## 2024-10-25 RX ORDER — IBUPROFEN 600 MG/1
600 TABLET, FILM COATED ORAL EVERY 6 HOURS SCHEDULED
Status: DISCONTINUED | OUTPATIENT
Start: 2024-10-25 | End: 2024-10-27 | Stop reason: HOSPADM

## 2024-10-25 RX ORDER — SODIUM CHLORIDE, SODIUM LACTATE, POTASSIUM CHLORIDE, CALCIUM CHLORIDE 600; 310; 30; 20 MG/100ML; MG/100ML; MG/100ML; MG/100ML
100 INJECTION, SOLUTION INTRAVENOUS CONTINUOUS
Status: DISCONTINUED | OUTPATIENT
Start: 2024-10-25 | End: 2024-10-27 | Stop reason: HOSPADM

## 2024-10-25 RX ORDER — BISACODYL 5 MG/1
5 TABLET, DELAYED RELEASE ORAL DAILY PRN
Status: DISCONTINUED | OUTPATIENT
Start: 2024-10-25 | End: 2024-10-27 | Stop reason: HOSPADM

## 2024-10-25 RX ORDER — SODIUM CHLORIDE 0.9 % (FLUSH) 0.9 %
10 SYRINGE (ML) INJECTION EVERY 12 HOURS SCHEDULED
Status: DISCONTINUED | OUTPATIENT
Start: 2024-10-25 | End: 2024-10-27 | Stop reason: HOSPADM

## 2024-10-25 RX ORDER — AMOXICILLIN 250 MG
2 CAPSULE ORAL 2 TIMES DAILY
Status: DISCONTINUED | OUTPATIENT
Start: 2024-10-25 | End: 2024-10-27 | Stop reason: HOSPADM

## 2024-10-25 RX ORDER — DOCUSATE SODIUM 100 MG/1
100 CAPSULE, LIQUID FILLED ORAL 2 TIMES DAILY PRN
Status: DISCONTINUED | OUTPATIENT
Start: 2024-10-25 | End: 2024-10-27 | Stop reason: HOSPADM

## 2024-10-25 RX ORDER — PRENATAL VIT/IRON FUM/FOLIC AC 27MG-0.8MG
1 TABLET ORAL DAILY
Status: DISCONTINUED | OUTPATIENT
Start: 2024-10-25 | End: 2024-10-27 | Stop reason: HOSPADM

## 2024-10-25 RX ORDER — ONDANSETRON 2 MG/ML
4 INJECTION INTRAMUSCULAR; INTRAVENOUS ONCE
Status: COMPLETED | OUTPATIENT
Start: 2024-10-25 | End: 2024-10-25

## 2024-10-25 RX ADMIN — SODIUM CHLORIDE, POTASSIUM CHLORIDE, SODIUM LACTATE AND CALCIUM CHLORIDE 100 ML/HR: 600; 310; 30; 20 INJECTION, SOLUTION INTRAVENOUS at 13:59

## 2024-10-25 RX ADMIN — SODIUM CHLORIDE 2000 MG: 900 INJECTION INTRAVENOUS at 12:40

## 2024-10-25 RX ADMIN — IBUPROFEN 600 MG: 600 TABLET, FILM COATED ORAL at 17:53

## 2024-10-25 RX ADMIN — Medication 10 ML: at 13:59

## 2024-10-25 RX ADMIN — SODIUM CHLORIDE 1000 ML: 9 INJECTION, SOLUTION INTRAVENOUS at 11:26

## 2024-10-25 RX ADMIN — MORPHINE SULFATE 4 MG: 4 INJECTION, SOLUTION INTRAMUSCULAR; INTRAVENOUS at 13:25

## 2024-10-25 RX ADMIN — IBUPROFEN 600 MG: 600 TABLET, FILM COATED ORAL at 23:57

## 2024-10-25 RX ADMIN — PRENATAL VITAMINS-IRON FUMARATE 27 MG IRON-FOLIC ACID 0.8 MG TABLET 1 TABLET: at 17:53

## 2024-10-25 RX ADMIN — SODIUM CHLORIDE 2000 MG: 900 INJECTION INTRAVENOUS at 20:04

## 2024-10-25 RX ADMIN — ONDANSETRON 4 MG: 2 INJECTION INTRAMUSCULAR; INTRAVENOUS at 11:26

## 2024-10-25 RX ADMIN — SENNOSIDES AND DOCUSATE SODIUM 2 TABLET: 50; 8.6 TABLET ORAL at 20:04

## 2024-10-25 RX ADMIN — MORPHINE SULFATE 2 MG: 2 INJECTION, SOLUTION INTRAMUSCULAR; INTRAVENOUS at 15:12

## 2024-10-25 RX ADMIN — MORPHINE SULFATE 4 MG: 4 INJECTION, SOLUTION INTRAMUSCULAR; INTRAVENOUS at 11:26

## 2024-10-25 NOTE — Clinical Note
Level of Care: Med/Surg [1]   Admitting Physician: CHRISTIE FERRARA [[id number]]   Attending Physician: CHRISTIE FERRARA [[id number]]
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
## 2024-10-26 ENCOUNTER — APPOINTMENT (OUTPATIENT)
Dept: GENERAL RADIOLOGY | Facility: HOSPITAL | Age: 29
End: 2024-10-26
Payer: OTHER GOVERNMENT

## 2024-10-26 ENCOUNTER — ANESTHESIA (OUTPATIENT)
Dept: PERIOP | Facility: HOSPITAL | Age: 29
End: 2024-10-26
Payer: OTHER GOVERNMENT

## 2024-10-26 LAB
ALBUMIN SERPL-MCNC: 3.8 G/DL (ref 3.5–5.2)
ALBUMIN/GLOB SERPL: 1.5 G/DL
ALP SERPL-CCNC: 66 U/L (ref 39–117)
ALT SERPL W P-5'-P-CCNC: 15 U/L (ref 1–33)
AMYLASE SERPL-CCNC: 40 U/L (ref 28–100)
ANION GAP SERPL CALCULATED.3IONS-SCNC: 8 MMOL/L (ref 5–15)
AST SERPL-CCNC: 15 U/L (ref 1–32)
BASOPHILS # BLD AUTO: 0.04 10*3/MM3 (ref 0–0.2)
BASOPHILS NFR BLD AUTO: 0.3 % (ref 0–1.5)
BILIRUB SERPL-MCNC: 0.5 MG/DL (ref 0–1.2)
BUN SERPL-MCNC: 5 MG/DL (ref 6–20)
BUN/CREAT SERPL: 7.7 (ref 7–25)
CALCIUM SPEC-SCNC: 8.9 MG/DL (ref 8.6–10.5)
CHLORIDE SERPL-SCNC: 105 MMOL/L (ref 98–107)
CO2 SERPL-SCNC: 23 MMOL/L (ref 22–29)
CREAT SERPL-MCNC: 0.65 MG/DL (ref 0.57–1)
DEPRECATED RDW RBC AUTO: 40.9 FL (ref 37–54)
EGFRCR SERPLBLD CKD-EPI 2021: 123.2 ML/MIN/1.73
EOSINOPHIL # BLD AUTO: 0.25 10*3/MM3 (ref 0–0.4)
EOSINOPHIL NFR BLD AUTO: 2.1 % (ref 0.3–6.2)
ERYTHROCYTE [DISTWIDTH] IN BLOOD BY AUTOMATED COUNT: 12.2 % (ref 12.3–15.4)
GLOBULIN UR ELPH-MCNC: 2.5 GM/DL
GLUCOSE SERPL-MCNC: 96 MG/DL (ref 65–99)
HCT VFR BLD AUTO: 34.1 % (ref 34–46.6)
HGB BLD-MCNC: 11.9 G/DL (ref 12–15.9)
IMM GRANULOCYTES # BLD AUTO: 0.03 10*3/MM3 (ref 0–0.05)
IMM GRANULOCYTES NFR BLD AUTO: 0.2 % (ref 0–0.5)
INR PPP: 1.09 (ref 0.89–1.12)
LIPASE SERPL-CCNC: 25 U/L (ref 13–60)
LYMPHOCYTES # BLD AUTO: 2.1 10*3/MM3 (ref 0.7–3.1)
LYMPHOCYTES NFR BLD AUTO: 17.4 % (ref 19.6–45.3)
MCH RBC QN AUTO: 32.2 PG (ref 26.6–33)
MCHC RBC AUTO-ENTMCNC: 34.9 G/DL (ref 31.5–35.7)
MCV RBC AUTO: 92.2 FL (ref 79–97)
MONOCYTES # BLD AUTO: 1.16 10*3/MM3 (ref 0.1–0.9)
MONOCYTES NFR BLD AUTO: 9.6 % (ref 5–12)
NEUTROPHILS NFR BLD AUTO: 70.4 % (ref 42.7–76)
NEUTROPHILS NFR BLD AUTO: 8.5 10*3/MM3 (ref 1.7–7)
NRBC BLD AUTO-RTO: 0 /100 WBC (ref 0–0.2)
PLATELET # BLD AUTO: 213 10*3/MM3 (ref 140–450)
PMV BLD AUTO: 9.9 FL (ref 6–12)
POTASSIUM SERPL-SCNC: 3.9 MMOL/L (ref 3.5–5.2)
PROT SERPL-MCNC: 6.3 G/DL (ref 6–8.5)
PROTHROMBIN TIME: 14.2 SECONDS (ref 12.2–14.5)
RBC # BLD AUTO: 3.7 10*6/MM3 (ref 3.77–5.28)
SODIUM SERPL-SCNC: 136 MMOL/L (ref 136–145)
WBC NRBC COR # BLD AUTO: 12.08 10*3/MM3 (ref 3.4–10.8)

## 2024-10-26 PROCEDURE — 83690 ASSAY OF LIPASE: CPT | Performed by: SURGERY

## 2024-10-26 PROCEDURE — 80053 COMPREHEN METABOLIC PANEL: CPT | Performed by: SURGERY

## 2024-10-26 PROCEDURE — 25010000002 CEFAZOLIN PER 500 MG: Performed by: SURGERY

## 2024-10-26 PROCEDURE — 25010000002 ONDANSETRON PER 1 MG: Performed by: NURSE ANESTHETIST, CERTIFIED REGISTERED

## 2024-10-26 PROCEDURE — 25810000003 SODIUM CHLORIDE PER 500 ML: Performed by: SURGERY

## 2024-10-26 PROCEDURE — 25010000002 FENTANYL CITRATE (PF) 50 MCG/ML SOLUTION: Performed by: NURSE ANESTHETIST, CERTIFIED REGISTERED

## 2024-10-26 PROCEDURE — 25010000002 PROPOFOL 10 MG/ML EMULSION: Performed by: NURSE ANESTHETIST, CERTIFIED REGISTERED

## 2024-10-26 PROCEDURE — 25810000003 LACTATED RINGERS PER 1000 ML: Performed by: SURGERY

## 2024-10-26 PROCEDURE — 82150 ASSAY OF AMYLASE: CPT | Performed by: SURGERY

## 2024-10-26 PROCEDURE — 25010000002 LIDOCAINE PF 1% 1 % SOLUTION: Performed by: NURSE ANESTHETIST, CERTIFIED REGISTERED

## 2024-10-26 PROCEDURE — 63710000001 ONDANSETRON ODT 4 MG TABLET DISPERSIBLE: Performed by: SURGERY

## 2024-10-26 PROCEDURE — 88304 TISSUE EXAM BY PATHOLOGIST: CPT | Performed by: SURGERY

## 2024-10-26 PROCEDURE — 25010000002 MEPERIDINE PER 100 MG: Performed by: NURSE ANESTHETIST, CERTIFIED REGISTERED

## 2024-10-26 PROCEDURE — 25010000002 MORPHINE PER 10 MG: Performed by: SURGERY

## 2024-10-26 PROCEDURE — 85610 PROTHROMBIN TIME: CPT | Performed by: SURGERY

## 2024-10-26 PROCEDURE — 25810000003 LACTATED RINGERS PER 1000 ML: Performed by: ANESTHESIOLOGY

## 2024-10-26 PROCEDURE — 25010000002 CEFAZOLIN PER 500 MG: Performed by: NURSE ANESTHETIST, CERTIFIED REGISTERED

## 2024-10-26 PROCEDURE — G0378 HOSPITAL OBSERVATION PER HR: HCPCS

## 2024-10-26 PROCEDURE — 74300 X-RAY BILE DUCTS/PANCREAS: CPT

## 2024-10-26 PROCEDURE — 25010000002 SUGAMMADEX 200 MG/2ML SOLUTION: Performed by: NURSE ANESTHETIST, CERTIFIED REGISTERED

## 2024-10-26 PROCEDURE — 85025 COMPLETE CBC W/AUTO DIFF WBC: CPT | Performed by: SURGERY

## 2024-10-26 PROCEDURE — 25010000002 FENTANYL CITRATE (PF) 100 MCG/2ML SOLUTION: Performed by: NURSE ANESTHETIST, CERTIFIED REGISTERED

## 2024-10-26 PROCEDURE — 25010000002 DROPERIDOL PER 5 MG: Performed by: NURSE ANESTHETIST, CERTIFIED REGISTERED

## 2024-10-26 PROCEDURE — 63710000001 PROMETHAZINE PER 25 MG: Performed by: SURGERY

## 2024-10-26 PROCEDURE — 25510000001 IOPAMIDOL 61 % SOLUTION: Performed by: SURGERY

## 2024-10-26 DEVICE — LIGACLIP 10-M/L, 10MM ENDOSCOPIC ROTATING MULTIPLE CLIP APPLIERS
Type: IMPLANTABLE DEVICE | Site: ABDOMEN | Status: FUNCTIONAL
Brand: LIGACLIP

## 2024-10-26 RX ORDER — SIMETHICONE 80 MG
80 TABLET,CHEWABLE ORAL 4 TIMES DAILY PRN
Status: DISCONTINUED | OUTPATIENT
Start: 2024-10-26 | End: 2024-10-27 | Stop reason: HOSPADM

## 2024-10-26 RX ORDER — LABETALOL HYDROCHLORIDE 5 MG/ML
5 INJECTION, SOLUTION INTRAVENOUS
Status: DISCONTINUED | OUTPATIENT
Start: 2024-10-26 | End: 2024-10-26 | Stop reason: HOSPADM

## 2024-10-26 RX ORDER — NALOXONE HCL 0.4 MG/ML
0.4 VIAL (ML) INJECTION AS NEEDED
Status: DISCONTINUED | OUTPATIENT
Start: 2024-10-26 | End: 2024-10-26 | Stop reason: HOSPADM

## 2024-10-26 RX ORDER — ONDANSETRON 2 MG/ML
4 INJECTION INTRAMUSCULAR; INTRAVENOUS EVERY 6 HOURS PRN
Status: DISCONTINUED | OUTPATIENT
Start: 2024-10-26 | End: 2024-10-27 | Stop reason: HOSPADM

## 2024-10-26 RX ORDER — BUPIVACAINE HYDROCHLORIDE AND EPINEPHRINE 2.5; 5 MG/ML; UG/ML
INJECTION, SOLUTION EPIDURAL; INFILTRATION; INTRACAUDAL; PERINEURAL AS NEEDED
Status: DISCONTINUED | OUTPATIENT
Start: 2024-10-26 | End: 2024-10-26 | Stop reason: HOSPADM

## 2024-10-26 RX ORDER — HYDROCODONE BITARTRATE AND ACETAMINOPHEN 5; 325 MG/1; MG/1
1 TABLET ORAL ONCE AS NEEDED
Status: DISCONTINUED | OUTPATIENT
Start: 2024-10-26 | End: 2024-10-26 | Stop reason: HOSPADM

## 2024-10-26 RX ORDER — SODIUM CHLORIDE 0.9 % (FLUSH) 0.9 %
3 SYRINGE (ML) INJECTION EVERY 12 HOURS SCHEDULED
Status: DISCONTINUED | OUTPATIENT
Start: 2024-10-26 | End: 2024-10-26 | Stop reason: HOSPADM

## 2024-10-26 RX ORDER — HEPARIN SODIUM 5000 [USP'U]/ML
5000 INJECTION, SOLUTION INTRAVENOUS; SUBCUTANEOUS EVERY 8 HOURS SCHEDULED
Status: DISCONTINUED | OUTPATIENT
Start: 2024-10-27 | End: 2024-10-27 | Stop reason: HOSPADM

## 2024-10-26 RX ORDER — ROCURONIUM BROMIDE 10 MG/ML
INJECTION, SOLUTION INTRAVENOUS AS NEEDED
Status: DISCONTINUED | OUTPATIENT
Start: 2024-10-26 | End: 2024-10-26 | Stop reason: SURG

## 2024-10-26 RX ORDER — PANTOPRAZOLE SODIUM 40 MG/1
40 TABLET, DELAYED RELEASE ORAL
Status: DISCONTINUED | OUTPATIENT
Start: 2024-10-26 | End: 2024-10-27 | Stop reason: HOSPADM

## 2024-10-26 RX ORDER — SODIUM CHLORIDE 9 MG/ML
INJECTION, SOLUTION INTRAVENOUS AS NEEDED
Status: DISCONTINUED | OUTPATIENT
Start: 2024-10-26 | End: 2024-10-26 | Stop reason: HOSPADM

## 2024-10-26 RX ORDER — PROPOFOL 10 MG/ML
VIAL (ML) INTRAVENOUS AS NEEDED
Status: DISCONTINUED | OUTPATIENT
Start: 2024-10-26 | End: 2024-10-26 | Stop reason: SURG

## 2024-10-26 RX ORDER — CEFAZOLIN SODIUM 1 G/3ML
INJECTION, POWDER, FOR SOLUTION INTRAMUSCULAR; INTRAVENOUS AS NEEDED
Status: DISCONTINUED | OUTPATIENT
Start: 2024-10-26 | End: 2024-10-26 | Stop reason: SURG

## 2024-10-26 RX ORDER — DROPERIDOL 2.5 MG/ML
0.62 INJECTION, SOLUTION INTRAMUSCULAR; INTRAVENOUS
Status: DISCONTINUED | OUTPATIENT
Start: 2024-10-26 | End: 2024-10-26 | Stop reason: HOSPADM

## 2024-10-26 RX ORDER — BISACODYL 5 MG/1
10 TABLET, DELAYED RELEASE ORAL DAILY
Status: DISCONTINUED | OUTPATIENT
Start: 2024-10-26 | End: 2024-10-27 | Stop reason: HOSPADM

## 2024-10-26 RX ORDER — SODIUM CHLORIDE, SODIUM LACTATE, POTASSIUM CHLORIDE, CALCIUM CHLORIDE 600; 310; 30; 20 MG/100ML; MG/100ML; MG/100ML; MG/100ML
9 INJECTION, SOLUTION INTRAVENOUS CONTINUOUS
Status: ACTIVE | OUTPATIENT
Start: 2024-10-26 | End: 2024-10-27

## 2024-10-26 RX ORDER — SODIUM CHLORIDE 0.9 % (FLUSH) 0.9 %
10 SYRINGE (ML) INJECTION AS NEEDED
Status: DISCONTINUED | OUTPATIENT
Start: 2024-10-26 | End: 2024-10-26 | Stop reason: HOSPADM

## 2024-10-26 RX ORDER — PROMETHAZINE HYDROCHLORIDE 12.5 MG/1
12.5 TABLET ORAL EVERY 6 HOURS PRN
Status: DISCONTINUED | OUTPATIENT
Start: 2024-10-26 | End: 2024-10-27 | Stop reason: HOSPADM

## 2024-10-26 RX ORDER — BUPIVACAINE HCL/0.9 % NACL/PF 0.125 %
PLASTIC BAG, INJECTION (ML) EPIDURAL AS NEEDED
Status: DISCONTINUED | OUTPATIENT
Start: 2024-10-26 | End: 2024-10-26 | Stop reason: SURG

## 2024-10-26 RX ORDER — ONDANSETRON 4 MG/1
4 TABLET, ORALLY DISINTEGRATING ORAL EVERY 6 HOURS PRN
Status: DISCONTINUED | OUTPATIENT
Start: 2024-10-26 | End: 2024-10-27 | Stop reason: HOSPADM

## 2024-10-26 RX ORDER — FAMOTIDINE 10 MG/ML
20 INJECTION, SOLUTION INTRAVENOUS ONCE
Status: COMPLETED | OUTPATIENT
Start: 2024-10-26 | End: 2024-10-26

## 2024-10-26 RX ORDER — PROMETHAZINE HYDROCHLORIDE 25 MG/1
25 TABLET ORAL ONCE AS NEEDED
Status: DISCONTINUED | OUTPATIENT
Start: 2024-10-26 | End: 2024-10-26 | Stop reason: HOSPADM

## 2024-10-26 RX ORDER — MIDAZOLAM HYDROCHLORIDE 1 MG/ML
1 INJECTION, SOLUTION INTRAMUSCULAR; INTRAVENOUS
Status: DISCONTINUED | OUTPATIENT
Start: 2024-10-26 | End: 2024-10-26 | Stop reason: HOSPADM

## 2024-10-26 RX ORDER — MEPERIDINE HYDROCHLORIDE 25 MG/ML
12.5 INJECTION INTRAMUSCULAR; INTRAVENOUS; SUBCUTANEOUS
Status: COMPLETED | OUTPATIENT
Start: 2024-10-26 | End: 2024-10-26

## 2024-10-26 RX ORDER — FENTANYL CITRATE 50 UG/ML
50 INJECTION, SOLUTION INTRAMUSCULAR; INTRAVENOUS
Status: DISCONTINUED | OUTPATIENT
Start: 2024-10-26 | End: 2024-10-26 | Stop reason: HOSPADM

## 2024-10-26 RX ORDER — ONDANSETRON 2 MG/ML
INJECTION INTRAMUSCULAR; INTRAVENOUS AS NEEDED
Status: DISCONTINUED | OUTPATIENT
Start: 2024-10-26 | End: 2024-10-26 | Stop reason: SURG

## 2024-10-26 RX ORDER — IOPAMIDOL 612 MG/ML
INJECTION, SOLUTION INTRAVASCULAR AS NEEDED
Status: DISCONTINUED | OUTPATIENT
Start: 2024-10-26 | End: 2024-10-26 | Stop reason: HOSPADM

## 2024-10-26 RX ORDER — HYDROMORPHONE HYDROCHLORIDE 1 MG/ML
0.5 INJECTION, SOLUTION INTRAMUSCULAR; INTRAVENOUS; SUBCUTANEOUS
Status: DISCONTINUED | OUTPATIENT
Start: 2024-10-26 | End: 2024-10-27 | Stop reason: HOSPADM

## 2024-10-26 RX ORDER — PROMETHAZINE HYDROCHLORIDE 25 MG/1
25 SUPPOSITORY RECTAL ONCE AS NEEDED
Status: DISCONTINUED | OUTPATIENT
Start: 2024-10-26 | End: 2024-10-26 | Stop reason: HOSPADM

## 2024-10-26 RX ORDER — DROPERIDOL 2.5 MG/ML
0.62 INJECTION, SOLUTION INTRAMUSCULAR; INTRAVENOUS ONCE AS NEEDED
Status: DISCONTINUED | OUTPATIENT
Start: 2024-10-26 | End: 2024-10-26 | Stop reason: HOSPADM

## 2024-10-26 RX ORDER — FAMOTIDINE 20 MG/1
20 TABLET, FILM COATED ORAL ONCE
Status: DISCONTINUED | OUTPATIENT
Start: 2024-10-26 | End: 2024-10-26 | Stop reason: HOSPADM

## 2024-10-26 RX ORDER — OXYCODONE AND ACETAMINOPHEN 5; 325 MG/1; MG/1
2 TABLET ORAL EVERY 4 HOURS PRN
Status: DISCONTINUED | OUTPATIENT
Start: 2024-10-26 | End: 2024-10-27 | Stop reason: HOSPADM

## 2024-10-26 RX ORDER — IPRATROPIUM BROMIDE AND ALBUTEROL SULFATE 2.5; .5 MG/3ML; MG/3ML
3 SOLUTION RESPIRATORY (INHALATION) ONCE AS NEEDED
Status: DISCONTINUED | OUTPATIENT
Start: 2024-10-26 | End: 2024-10-26 | Stop reason: HOSPADM

## 2024-10-26 RX ORDER — HYDRALAZINE HYDROCHLORIDE 20 MG/ML
5 INJECTION INTRAMUSCULAR; INTRAVENOUS
Status: DISCONTINUED | OUTPATIENT
Start: 2024-10-26 | End: 2024-10-26 | Stop reason: HOSPADM

## 2024-10-26 RX ORDER — SODIUM CHLORIDE 0.9 % (FLUSH) 0.9 %
10 SYRINGE (ML) INJECTION EVERY 12 HOURS SCHEDULED
Status: DISCONTINUED | OUTPATIENT
Start: 2024-10-26 | End: 2024-10-26 | Stop reason: HOSPADM

## 2024-10-26 RX ORDER — SCOLOPAMINE TRANSDERMAL SYSTEM 1 MG/1
1 PATCH, EXTENDED RELEASE TRANSDERMAL ONCE
Status: DISCONTINUED | OUTPATIENT
Start: 2024-10-26 | End: 2024-10-26

## 2024-10-26 RX ORDER — NALOXONE HCL 0.4 MG/ML
0.1 VIAL (ML) INJECTION
Status: DISCONTINUED | OUTPATIENT
Start: 2024-10-26 | End: 2024-10-27 | Stop reason: HOSPADM

## 2024-10-26 RX ORDER — SODIUM CHLORIDE 0.9 % (FLUSH) 0.9 %
3-10 SYRINGE (ML) INJECTION AS NEEDED
Status: DISCONTINUED | OUTPATIENT
Start: 2024-10-26 | End: 2024-10-26 | Stop reason: HOSPADM

## 2024-10-26 RX ORDER — SODIUM CHLORIDE 9 MG/ML
9 INJECTION, SOLUTION INTRAVENOUS AS NEEDED
Status: DISCONTINUED | OUTPATIENT
Start: 2024-10-26 | End: 2024-10-26 | Stop reason: HOSPADM

## 2024-10-26 RX ORDER — DOCUSATE SODIUM 100 MG/1
100 CAPSULE, LIQUID FILLED ORAL 2 TIMES DAILY
Status: DISCONTINUED | OUTPATIENT
Start: 2024-10-26 | End: 2024-10-27 | Stop reason: HOSPADM

## 2024-10-26 RX ORDER — ONDANSETRON 2 MG/ML
4 INJECTION INTRAMUSCULAR; INTRAVENOUS ONCE AS NEEDED
Status: COMPLETED | OUTPATIENT
Start: 2024-10-26 | End: 2024-10-26

## 2024-10-26 RX ORDER — LIDOCAINE HYDROCHLORIDE 10 MG/ML
INJECTION, SOLUTION EPIDURAL; INFILTRATION; INTRACAUDAL; PERINEURAL AS NEEDED
Status: DISCONTINUED | OUTPATIENT
Start: 2024-10-26 | End: 2024-10-26 | Stop reason: SURG

## 2024-10-26 RX ORDER — FENTANYL CITRATE 50 UG/ML
INJECTION, SOLUTION INTRAMUSCULAR; INTRAVENOUS AS NEEDED
Status: DISCONTINUED | OUTPATIENT
Start: 2024-10-26 | End: 2024-10-26 | Stop reason: SURG

## 2024-10-26 RX ORDER — HYDROMORPHONE HYDROCHLORIDE 1 MG/ML
0.5 INJECTION, SOLUTION INTRAMUSCULAR; INTRAVENOUS; SUBCUTANEOUS
Status: DISCONTINUED | OUTPATIENT
Start: 2024-10-26 | End: 2024-10-26 | Stop reason: HOSPADM

## 2024-10-26 RX ORDER — PROMETHAZINE HYDROCHLORIDE 25 MG/1
25 TABLET ORAL EVERY 6 HOURS PRN
Status: DISCONTINUED | OUTPATIENT
Start: 2024-10-26 | End: 2024-10-27 | Stop reason: HOSPADM

## 2024-10-26 RX ORDER — SODIUM CHLORIDE, SODIUM LACTATE, POTASSIUM CHLORIDE, CALCIUM CHLORIDE 600; 310; 30; 20 MG/100ML; MG/100ML; MG/100ML; MG/100ML
9 INJECTION, SOLUTION INTRAVENOUS CONTINUOUS
Status: DISCONTINUED | OUTPATIENT
Start: 2024-10-27 | End: 2024-10-26

## 2024-10-26 RX ORDER — LIDOCAINE HYDROCHLORIDE 10 MG/ML
0.5 INJECTION, SOLUTION EPIDURAL; INFILTRATION; INTRACAUDAL; PERINEURAL ONCE AS NEEDED
Status: DISCONTINUED | OUTPATIENT
Start: 2024-10-26 | End: 2024-10-26 | Stop reason: HOSPADM

## 2024-10-26 RX ADMIN — FENTANYL CITRATE 50 MCG: 50 INJECTION, SOLUTION INTRAMUSCULAR; INTRAVENOUS at 09:17

## 2024-10-26 RX ADMIN — LIDOCAINE HYDROCHLORIDE 50 MG: 10 INJECTION, SOLUTION EPIDURAL; INFILTRATION; INTRACAUDAL; PERINEURAL at 08:06

## 2024-10-26 RX ADMIN — PROMETHAZINE HYDROCHLORIDE 25 MG: 25 TABLET ORAL at 12:23

## 2024-10-26 RX ADMIN — SCOPOLAMINE 1 PATCH: 1.5 PATCH, EXTENDED RELEASE TRANSDERMAL at 07:51

## 2024-10-26 RX ADMIN — PRENATAL VITAMINS-IRON FUMARATE 27 MG IRON-FOLIC ACID 0.8 MG TABLET 1 TABLET: at 12:23

## 2024-10-26 RX ADMIN — ONDANSETRON 4 MG: 2 INJECTION INTRAMUSCULAR; INTRAVENOUS at 08:56

## 2024-10-26 RX ADMIN — PANTOPRAZOLE SODIUM 40 MG: 40 TABLET, DELAYED RELEASE ORAL at 12:23

## 2024-10-26 RX ADMIN — IBUPROFEN 600 MG: 600 TABLET, FILM COATED ORAL at 18:16

## 2024-10-26 RX ADMIN — MEPERIDINE HYDROCHLORIDE 12.5 MG: 25 INJECTION INTRAMUSCULAR; INTRAVENOUS; SUBCUTANEOUS at 09:41

## 2024-10-26 RX ADMIN — DROPERIDOL 0.62 MG: 2.5 INJECTION, SOLUTION INTRAMUSCULAR; INTRAVENOUS at 09:19

## 2024-10-26 RX ADMIN — Medication 10 ML: at 20:23

## 2024-10-26 RX ADMIN — MORPHINE SULFATE 2 MG: 2 INJECTION, SOLUTION INTRAMUSCULAR; INTRAVENOUS at 12:34

## 2024-10-26 RX ADMIN — MEPERIDINE HYDROCHLORIDE 12.5 MG: 25 INJECTION INTRAMUSCULAR; INTRAVENOUS; SUBCUTANEOUS at 09:35

## 2024-10-26 RX ADMIN — IBUPROFEN 600 MG: 600 TABLET, FILM COATED ORAL at 23:57

## 2024-10-26 RX ADMIN — SODIUM CHLORIDE 2000 MG: 900 INJECTION INTRAVENOUS at 20:22

## 2024-10-26 RX ADMIN — CEFAZOLIN 2 G: 1 INJECTION, POWDER, FOR SOLUTION INTRAMUSCULAR; INTRAVENOUS at 08:15

## 2024-10-26 RX ADMIN — IBUPROFEN 600 MG: 600 TABLET, FILM COATED ORAL at 05:00

## 2024-10-26 RX ADMIN — DOCUSATE SODIUM 100 MG: 100 CAPSULE, LIQUID FILLED ORAL at 12:23

## 2024-10-26 RX ADMIN — FENTANYL CITRATE 50 MCG: 50 INJECTION, SOLUTION INTRAMUSCULAR; INTRAVENOUS at 08:32

## 2024-10-26 RX ADMIN — FAMOTIDINE 20 MG: 10 INJECTION, SOLUTION INTRAVENOUS at 07:52

## 2024-10-26 RX ADMIN — ONDANSETRON 4 MG: 2 INJECTION INTRAMUSCULAR; INTRAVENOUS at 09:33

## 2024-10-26 RX ADMIN — IBUPROFEN 600 MG: 600 TABLET, FILM COATED ORAL at 12:23

## 2024-10-26 RX ADMIN — ONDANSETRON 4 MG: 4 TABLET, ORALLY DISINTEGRATING ORAL at 16:24

## 2024-10-26 RX ADMIN — SENNOSIDES AND DOCUSATE SODIUM 2 TABLET: 50; 8.6 TABLET ORAL at 20:22

## 2024-10-26 RX ADMIN — DOCUSATE SODIUM 100 MG: 100 CAPSULE, LIQUID FILLED ORAL at 20:22

## 2024-10-26 RX ADMIN — SODIUM CHLORIDE 2000 MG: 900 INJECTION INTRAVENOUS at 04:59

## 2024-10-26 RX ADMIN — OXYCODONE HYDROCHLORIDE AND ACETAMINOPHEN 2 TABLET: 5; 325 TABLET ORAL at 16:24

## 2024-10-26 RX ADMIN — ROCURONIUM BROMIDE 50 MG: 10 INJECTION INTRAVENOUS at 08:06

## 2024-10-26 RX ADMIN — SODIUM CHLORIDE, POTASSIUM CHLORIDE, SODIUM LACTATE AND CALCIUM CHLORIDE: 600; 310; 30; 20 INJECTION, SOLUTION INTRAVENOUS at 07:59

## 2024-10-26 RX ADMIN — SODIUM CHLORIDE, POTASSIUM CHLORIDE, SODIUM LACTATE AND CALCIUM CHLORIDE 100 ML/HR: 600; 310; 30; 20 INJECTION, SOLUTION INTRAVENOUS at 02:09

## 2024-10-26 RX ADMIN — FENTANYL CITRATE 50 MCG: 50 INJECTION, SOLUTION INTRAMUSCULAR; INTRAVENOUS at 08:06

## 2024-10-26 RX ADMIN — SUGAMMADEX 200 MG: 100 INJECTION, SOLUTION INTRAVENOUS at 08:56

## 2024-10-26 RX ADMIN — Medication 100 MCG: at 08:10

## 2024-10-26 RX ADMIN — SODIUM CHLORIDE 2000 MG: 900 INJECTION INTRAVENOUS at 16:18

## 2024-10-26 RX ADMIN — PROPOFOL 200 MG: 10 INJECTION, EMULSION INTRAVENOUS at 08:06

## 2024-10-26 NOTE — PLAN OF CARE
Problem: Adult Inpatient Plan of Care  Goal: Plan of Care Review  Outcome: Progressing  Goal: Patient-Specific Goal (Individualized)  Outcome: Progressing  Goal: Absence of Hospital-Acquired Illness or Injury  Outcome: Progressing  Intervention: Identify and Manage Fall Risk  Recent Flowsheet Documentation  Taken 10/26/2024 0600 by Rosa Warner RN  Safety Promotion/Fall Prevention:   activity supervised   assistive device/personal items within reach   clutter free environment maintained   safety round/check completed   room organization consistent  Taken 10/26/2024 0400 by Rosa Warner RN  Safety Promotion/Fall Prevention:   activity supervised   assistive device/personal items within reach   clutter free environment maintained   safety round/check completed   room organization consistent  Taken 10/26/2024 0200 by Rosa Warner RN  Safety Promotion/Fall Prevention:   activity supervised   assistive device/personal items within reach   clutter free environment maintained   safety round/check completed   room organization consistent  Taken 10/26/2024 0000 by Rosa Warner RN  Safety Promotion/Fall Prevention:   activity supervised   assistive device/personal items within reach   clutter free environment maintained   safety round/check completed   room organization consistent  Taken 10/25/2024 2200 by Rosa Warner RN  Safety Promotion/Fall Prevention:   activity supervised   assistive device/personal items within reach   clutter free environment maintained   safety round/check completed   room organization consistent  Taken 10/25/2024 2000 by Rosa Warner RN  Safety Promotion/Fall Prevention:   activity supervised   assistive device/personal items within reach   clutter free environment maintained   safety round/check completed   room organization consistent  Intervention: Prevent Skin Injury  Recent Flowsheet Documentation  Taken 10/26/2024 0600 by Rosa Warner RN  Body Position: position changed  independently  Taken 10/26/2024 0400 by Rosa Warner RN  Body Position: position changed independently  Taken 10/26/2024 0200 by Rosa Warner RN  Body Position: position changed independently  Taken 10/26/2024 0000 by Rosa Warner RN  Body Position: position changed independently  Taken 10/25/2024 2200 by Rosa Warner RN  Body Position: position changed independently  Taken 10/25/2024 2000 by Rosa Warner RN  Body Position: position changed independently  Goal: Optimal Comfort and Wellbeing  Outcome: Progressing  Intervention: Provide Person-Centered Care  Recent Flowsheet Documentation  Taken 10/25/2024 2000 by Rosa Warner RN  Trust Relationship/Rapport:   care explained   choices provided   emotional support provided   empathic listening provided   questions answered   questions encouraged   reassurance provided   thoughts/feelings acknowledged  Goal: Readiness for Transition of Care  Outcome: Progressing     Problem: Pain Acute  Goal: Optimal Pain Control and Function  Outcome: Progressing  Intervention: Optimize Psychosocial Wellbeing  Recent Flowsheet Documentation  Taken 10/25/2024 2000 by Rosa Warner RN  Diversional Activities:   television   smartphone  Intervention: Prevent or Manage Pain  Recent Flowsheet Documentation  Taken 10/26/2024 0600 by Rosa Warner RN  Medication Review/Management: medications reviewed  Taken 10/26/2024 0400 by Rosa Warner RN  Medication Review/Management: medications reviewed  Taken 10/26/2024 0200 by Rosa Warner RN  Medication Review/Management: medications reviewed  Taken 10/26/2024 0000 by Rosa Warner RN  Medication Review/Management: medications reviewed  Taken 10/25/2024 2200 by Rosa Warner RN  Medication Review/Management: medications reviewed  Taken 10/25/2024 2000 by Rosa Warner RN  Medication Review/Management: medications reviewed   Goal Outcome Evaluation:

## 2024-10-26 NOTE — PROGRESS NOTES
"Patient Name:  Shweta Canela  YOB: 1995  [id number]    Surgery Post - Operative Note    Date of visit: 10/26/2024    Subjective   Subjective: Feels sore with mild nausea post-op       Objective     Objective:    BP (!) (P) 131/104 (BP Location: Left arm, Patient Position: Sitting)   Pulse (P) 80   Temp (P) 98.3 °F (36.8 °C) (Oral)   Resp (P) 18   Ht 154.9 cm (61\")   Wt 65.8 kg (145 lb)   LMP 09/29/2024 (Approximate)   SpO2 98%   Breastfeeding No   BMI 27.40 kg/m²     CV:  Rate  regular and rhythm  regular  L:  Clear  to auscultation bilaterally   ABD:  Soft, appropriately tender. Dressings  clean, dry and intact   EXT:  No cyanosis, clubbing or edema             Assessment/ Plan: Doing well after Lap CCY. Increase mobility. Continue Pulmonary toilet. Possibly home tomorrow.    Problem List Items Addressed This Visit          Gastrointestinal Abdominal     * (Principal) Acute cholecystitis - Primary    Relevant Orders    Tissue Pathology Exam     Other Visit Diagnoses       Acute abdominal pain in right upper quadrant                 Active Hospital Problems    Diagnosis  POA    **Acute cholecystitis [K81.0]  Yes      Resolved Hospital Problems   No resolved problems to display.            Ori Jean Baptiste MD  10/26/2024  13:38 EDT    "

## 2024-10-26 NOTE — BRIEF OP NOTE
CHOLECYSTECTOMY LAPAROSCOPIC INTRAOPERATIVE CHOLANGIOGRAM  Progress Note    Shweta Canela  10/26/2024    Pre-op Diagnosis:   Acute cholecystitis       Post-Op Diagnosis Codes:  Same    Procedure/CPT® Codes:        Procedure(s):  CHOLECYSTECTOMY LAPAROSCOPIC INTRAOPERATIVE CHOLANGIOGRAM              Surgeon(s):  Ori Jean Baptiste MD    Anesthesia: General    Staff:   Circulator: Nena Craig RN  Radiology Technologist: Armaan Beasley RT  Scrub Person: Lena Davis  Nursing Assistant: Anish Hernandez         Estimated Blood Loss: minimal    Urine Voided: * No values recorded between 10/26/2024  7:58 AM and 10/26/2024  9:02 AM *    Specimens:                Specimens       ID Source Type Tests Collected By Collected At Frozen?    A Gallbladder Tissue TISSUE PATHOLOGY EXAM   Ori Jean Baptiste MD 10/26/24 0837 No                  Drains: * No LDAs found *    Findings: IOC (-)          Complications: None          Ori Jean Baptiste MD     Date: 10/26/2024  Time: 09:06 EDT

## 2024-10-26 NOTE — ANESTHESIA PROCEDURE NOTES
Airway  Urgency: elective    Date/Time: 10/26/2024 8:07 AM  Airway not difficult    General Information and Staff    Patient location during procedure: OR  CRNA/CAA: Esther Ruiz CRNA    Indications and Patient Condition  Indications for airway management: airway protection    Preoxygenated: yes  MILS not maintained throughout  Mask difficulty assessment: 1 - vent by mask    Final Airway Details  Final airway type: endotracheal airway      Successful airway: ETT  Cuffed: yes   Successful intubation technique: video laryngoscopy  Endotracheal tube insertion site: oral  Blade: Sridevi  Blade size: 3  ETT size (mm): 7.5  Cormack-Lehane Classification: grade I - full view of glottis  Placement verified by: chest auscultation and capnometry   Measured from: lips  ETT/EBT  to lips (cm): 20  Number of attempts at approach: 1  Assessment: lips, teeth, and gum same as pre-op and atraumatic intubation    Additional Comments  Negative epigastric sounds, Breath sound equal bilaterally with symmetric chest rise and fall

## 2024-10-26 NOTE — ANESTHESIA POSTPROCEDURE EVALUATION
Patient: Shweta Canela    Procedure Summary       Date: 10/26/24 [facility] / [facility]:  [facility]    Anesthesia Start: 0759 Anesthesia Stop: 0915    Procedure: CHOLECYSTECTOMY LAPAROSCOPIC INTRAOPERATIVE CHOLANGIOGRAM (Abdomen) Diagnosis:     Surgeons: Ori Jean Baptiste MD Provider: Angelita Alberto MD    Anesthesia Type: general ASA Status: 2            Anesthesia Type: general    Vitals  Vitals Value Taken Time   /77    Temp 98.0    Pulse 111 10/26/24 0913   Resp 12    SpO2 100 % 10/26/24 0913   Vitals shown include unfiled device data.        Post Anesthesia Care and Evaluation    Patient location during evaluation: PACU  Patient participation: complete - patient participated  Level of consciousness: awake and alert  Pain score: 0  Pain management: adequate    Airway patency: patent  Anesthetic complications: No anesthetic complications  PONV Status: none  Cardiovascular status: hemodynamically stable and acceptable  Respiratory status: nonlabored ventilation, acceptable and nasal cannula  Hydration status: acceptable

## 2024-10-26 NOTE — ANESTHESIA PREPROCEDURE EVALUATION
Anesthesia Evaluation     Patient summary reviewed and Nursing notes reviewed   history of anesthetic complications:  PONV  NPO Solid Status: > 8 hours  NPO Liquid Status: > 8 hours           Airway   Mallampati: II  TM distance: >3 FB  Neck ROM: full  No difficulty expected  Dental - normal exam     Pulmonary - normal exam   (+) asthma,  Cardiovascular - normal exam    (+) hypertension, valvular problems/murmurs murmur  (-) angina, ROGERS      Neuro/Psych  (+) headaches, psychiatric history Anxiety and Depression  (-) seizures, TIA, CVA  GI/Hepatic/Renal/Endo    (-) GERD, liver disease, no renal disease, diabetes, no thyroid disorder    Musculoskeletal     Abdominal    Substance History      OB/GYN          Other        ROS/Med Hx Other: HCG negative              Anesthesia Plan    ASA 2     general     (Scop patch)  intravenous induction     Anesthetic plan, risks, benefits, and alternatives have been provided, discussed and informed consent has been obtained with: patient.    Plan discussed with CRNA.    CODE STATUS:

## 2024-10-26 NOTE — OP NOTE
Operative Report    Patient Name:  Shweta Canela  YOB: 1995  [id number]  10/26/2024      PREOPERATIVE DIAGNOSIS: Acute cholecystitis      POSTOPERATIVE DIAGNOSIS: Same        PROCEDURE PERFORMED:     Laparoscopic cholecystectomy with intraoperative cholangiography        SURGEON: Ori Jean Baptiste MD      ASSISTANT:         SPECIMENS: Gallbladder and contents        ANESTHESIA: General.     EBL: Minimal        FINDINGS:     1. Gallbladder in standard positioning with acute inflammation    2. Intraoperative cholangiogram demonstrated excellent filling of the cystic, common, right and left hepatic ducts with good flow of contrast into the duodenum without retained stone or filling defect        INDICATIONS:      The patient is a 29 y.o. female with a history of abdominal pain, concerning for acute cholecystitis. Preoperative imaging including U/S confirmed the diagnosis. The risks and benefits of Laparoscopic cholecystectomy with cholangiography were discussed with the patient and their family and they agreed to proceed.        DESCRIPTION OF PROCEDURE:     After obtaining informed consent, the patient was taken to the operating room and placed in the supine position. After appropriate DVT and antibiotic prophylaxis, general anesthesia was induced. The abdomen was prepped and draped in standard sterile fashion, and after infiltrating the skin with local anesthetic, a 12mm skin incision was made inferior  to the umbilicus . Blunt dissection was carried down to the base of the umbilicus, which was grasped with a Kocher clamp and elevated anteriorly. A vertical midline incision was made in the fascia, and blunt dissection was carried down into the peritoneal cavity. A stay suture of 0 Vicryl was then placed in figure-of-eight fashion around the defect, and a blunt trocar advanced without difficulty into the abdominal cavity.  The abdomen was insufflated with carbon dioxide gas to a pressure of 15  "mmHg, and a laparoscope advanced through the trocar and the abdominal contents were inspected. There was no evidence of bowel, bladder, or visceral injury with entrance of the trocar . At this point, after infiltrating the skin with local anesthetic, a standard laparoscopic cholecystectomy trocar placement schema was followed.     The gallbladder was grasped and elevated superiorly.  It was acutely inflamed with perhaps early necrosis.  Using meticulous blunt dissection , the cystic duct and cystic artery were bluntly dissected free of other structures and clearly identified using the \"Critical View\" technique. The cystic artery was then clipped twice proximally and once distally, and divided between the clips.     The cystic duct was then clipped at its junction with the infundibulum of the gallbladder, and transected across 50% of its circumference. A cholangiogram catheter was then placed within the duct, and on-table cholangiography under fluoroscopy was obtained. There was excellent filling of the cystic, common, right and left hepatic ducts with good flow of contrast into the duodenum without retained stone or filling defect  . The cholangiogram catheter was then removed, and the cystic duct was ligated using a 2-0 silk suture tied laparoscopically,  reinforced with hemoclips, and divided.     The gallbladder was then dissected free of the gallbladder fossa using a combination of electrocautery and blunt dissection . There was a small posterior branch of the artery that was clipped and divided . The gallbladder was then placed in an Endo Catch bag, and removed from the inferiormost trocar site. It was inspected on the back table, correlated with intra-operative findings, and passed off as specimen.     The right upper quadrant was then inspected. The cystic duct and cystic artery stumps were intact without bleeding or biliary leak. The right upper quadrant was irrigated with saline until clear. The abdomen was " deflated and reinsufflated to make sure pneumoperitoneum was not tamponading any bleeding and there was none.  The abdomen was again irrigated with saline until clear, and all trocars removed under direct and laparoscopic visualization. The fascia at the inferiormost incision  was closed using the previously placed 0 Vicryl suture. The wounds were irrigated with normal saline, and closed in each area using absorbable subcuticular suture. The incisions were dressed in standard sterile fashion and covered with dry dressings. The patient recovered from anesthesia, was extubated in the operating room, and transferred to the PACU in stable condition.  All sponge and needle counts were correct times two at the completion of the procedure.     COMPLICATIONS: None           Ori Jean Baptiste MD  10/26/2024  09:06 EDT

## 2024-10-27 VITALS
DIASTOLIC BLOOD PRESSURE: 60 MMHG | OXYGEN SATURATION: 98 % | BODY MASS INDEX: 27.38 KG/M2 | SYSTOLIC BLOOD PRESSURE: 95 MMHG | TEMPERATURE: 98.1 F | WEIGHT: 145 LBS | HEART RATE: 85 BPM | RESPIRATION RATE: 16 BRPM | HEIGHT: 61 IN

## 2024-10-27 LAB
ALBUMIN SERPL-MCNC: 3.7 G/DL (ref 3.5–5.2)
ALBUMIN/GLOB SERPL: 1.9 G/DL
ALP SERPL-CCNC: 63 U/L (ref 39–117)
ALT SERPL W P-5'-P-CCNC: 18 U/L (ref 1–33)
ANION GAP SERPL CALCULATED.3IONS-SCNC: 8 MMOL/L (ref 5–15)
AST SERPL-CCNC: 19 U/L (ref 1–32)
BASOPHILS # BLD AUTO: 0.04 10*3/MM3 (ref 0–0.2)
BASOPHILS NFR BLD AUTO: 0.4 % (ref 0–1.5)
BILIRUB SERPL-MCNC: <0.2 MG/DL (ref 0–1.2)
BUN SERPL-MCNC: 5 MG/DL (ref 6–20)
BUN/CREAT SERPL: 6.2 (ref 7–25)
CALCIUM SPEC-SCNC: 8.7 MG/DL (ref 8.6–10.5)
CHLORIDE SERPL-SCNC: 107 MMOL/L (ref 98–107)
CO2 SERPL-SCNC: 26 MMOL/L (ref 22–29)
CREAT SERPL-MCNC: 0.81 MG/DL (ref 0.57–1)
DEPRECATED RDW RBC AUTO: 42.1 FL (ref 37–54)
EGFRCR SERPLBLD CKD-EPI 2021: 101.5 ML/MIN/1.73
EOSINOPHIL # BLD AUTO: 0.36 10*3/MM3 (ref 0–0.4)
EOSINOPHIL NFR BLD AUTO: 3.4 % (ref 0.3–6.2)
ERYTHROCYTE [DISTWIDTH] IN BLOOD BY AUTOMATED COUNT: 12.3 % (ref 12.3–15.4)
GLOBULIN UR ELPH-MCNC: 1.9 GM/DL
GLUCOSE SERPL-MCNC: 85 MG/DL (ref 65–99)
HCT VFR BLD AUTO: 32.4 % (ref 34–46.6)
HGB BLD-MCNC: 11.2 G/DL (ref 12–15.9)
IMM GRANULOCYTES # BLD AUTO: 0.03 10*3/MM3 (ref 0–0.05)
IMM GRANULOCYTES NFR BLD AUTO: 0.3 % (ref 0–0.5)
LIPASE SERPL-CCNC: 28 U/L (ref 13–60)
LYMPHOCYTES # BLD AUTO: 2.76 10*3/MM3 (ref 0.7–3.1)
LYMPHOCYTES NFR BLD AUTO: 26.2 % (ref 19.6–45.3)
MCH RBC QN AUTO: 32.4 PG (ref 26.6–33)
MCHC RBC AUTO-ENTMCNC: 34.6 G/DL (ref 31.5–35.7)
MCV RBC AUTO: 93.6 FL (ref 79–97)
MONOCYTES # BLD AUTO: 1.04 10*3/MM3 (ref 0.1–0.9)
MONOCYTES NFR BLD AUTO: 9.9 % (ref 5–12)
NEUTROPHILS NFR BLD AUTO: 59.8 % (ref 42.7–76)
NEUTROPHILS NFR BLD AUTO: 6.32 10*3/MM3 (ref 1.7–7)
NRBC BLD AUTO-RTO: 0 /100 WBC (ref 0–0.2)
PLATELET # BLD AUTO: 232 10*3/MM3 (ref 140–450)
PMV BLD AUTO: 9.5 FL (ref 6–12)
POTASSIUM SERPL-SCNC: 4.2 MMOL/L (ref 3.5–5.2)
PROT SERPL-MCNC: 5.6 G/DL (ref 6–8.5)
QT INTERVAL: 362 MS
QT INTERVAL: 372 MS
QTC INTERVAL: 415 MS
QTC INTERVAL: 426 MS
RBC # BLD AUTO: 3.46 10*6/MM3 (ref 3.77–5.28)
SODIUM SERPL-SCNC: 141 MMOL/L (ref 136–145)
WBC NRBC COR # BLD AUTO: 10.55 10*3/MM3 (ref 3.4–10.8)

## 2024-10-27 PROCEDURE — 25010000002 HEPARIN (PORCINE) PER 1000 UNITS: Performed by: SURGERY

## 2024-10-27 PROCEDURE — 85025 COMPLETE CBC W/AUTO DIFF WBC: CPT | Performed by: SURGERY

## 2024-10-27 PROCEDURE — 25010000002 CEFAZOLIN PER 500 MG: Performed by: SURGERY

## 2024-10-27 PROCEDURE — G0378 HOSPITAL OBSERVATION PER HR: HCPCS

## 2024-10-27 PROCEDURE — 80053 COMPREHEN METABOLIC PANEL: CPT | Performed by: SURGERY

## 2024-10-27 PROCEDURE — 83690 ASSAY OF LIPASE: CPT | Performed by: SURGERY

## 2024-10-27 RX ORDER — PSEUDOEPHEDRINE HCL 30 MG
100 TABLET ORAL 2 TIMES DAILY
Qty: 60 CAPSULE | Refills: 0 | Status: SHIPPED | OUTPATIENT
Start: 2024-10-27

## 2024-10-27 RX ORDER — ONDANSETRON 4 MG/1
4 TABLET, ORALLY DISINTEGRATING ORAL EVERY 6 HOURS PRN
Qty: 60 TABLET | Refills: 1 | Status: SHIPPED | OUTPATIENT
Start: 2024-10-27

## 2024-10-27 RX ORDER — METRONIDAZOLE 500 MG/1
500 TABLET ORAL 3 TIMES DAILY
Qty: 15 TABLET | Refills: 0 | Status: SHIPPED | OUTPATIENT
Start: 2024-10-27

## 2024-10-27 RX ORDER — OXYCODONE AND ACETAMINOPHEN 5; 325 MG/1; MG/1
1 TABLET ORAL EVERY 4 HOURS PRN
Qty: 11 TABLET | Refills: 0 | Status: SHIPPED | OUTPATIENT
Start: 2024-10-27

## 2024-10-27 RX ORDER — AMOXICILLIN AND CLAVULANATE POTASSIUM 500; 125 MG/1; MG/1
1 TABLET, FILM COATED ORAL 3 TIMES DAILY
Qty: 15 TABLET | Refills: 0 | Status: SHIPPED | OUTPATIENT
Start: 2024-10-27

## 2024-10-27 RX ADMIN — BISACODYL 10 MG: 5 TABLET, COATED ORAL at 09:39

## 2024-10-27 RX ADMIN — PANTOPRAZOLE SODIUM 40 MG: 40 TABLET, DELAYED RELEASE ORAL at 06:15

## 2024-10-27 RX ADMIN — IBUPROFEN 600 MG: 600 TABLET, FILM COATED ORAL at 06:15

## 2024-10-27 RX ADMIN — HEPARIN SODIUM 5000 UNITS: 5000 INJECTION INTRAVENOUS; SUBCUTANEOUS at 06:15

## 2024-10-27 RX ADMIN — SODIUM CHLORIDE 2000 MG: 900 INJECTION INTRAVENOUS at 04:02

## 2024-10-27 RX ADMIN — PRENATAL VITAMINS-IRON FUMARATE 27 MG IRON-FOLIC ACID 0.8 MG TABLET 1 TABLET: at 09:40

## 2024-10-27 RX ADMIN — DOCUSATE SODIUM 100 MG: 100 CAPSULE, LIQUID FILLED ORAL at 09:39

## 2024-10-27 RX ADMIN — SENNOSIDES AND DOCUSATE SODIUM 2 TABLET: 50; 8.6 TABLET ORAL at 09:39

## 2024-10-27 NOTE — PROGRESS NOTES
"Patient Name:  Shweta Canela  YOB: 1995  [id number]    Surgery Progress Note    Date of visit: 10/27/2024    Subjective   Subjective: Feeling better. Wants to go home.         Objective     Objective:     BP 95/60 (BP Location: Right arm, Patient Position: Lying)   Pulse 85   Temp 98.1 °F (36.7 °C) (Oral)   Resp 16   Ht 154.9 cm (61\")   Wt 65.8 kg (145 lb)   LMP 09/29/2024 (Approximate)   SpO2 98%   Breastfeeding No   BMI 27.40 kg/m²     Intake/Output Summary (Last 24 hours) at 10/27/2024 0939  Last data filed at 10/26/2024 2000  Gross per 24 hour   Intake 360 ml   Output --   Net 360 ml       CV:  Rhythm  regular and rate regular   L:  Clear  to auscultation bilaterally   Abd:  Bowel sounds positive , soft, appropriately tender. Dressings c/d/i  Ext:  No cyanosis, clubbing, edema    Recent labs that are back at this time have been reviewed.            Assessment/ Plan:    Problem List Items Addressed This Visit          Gastrointestinal Abdominal     * (Principal) Acute cholecystitis - Primary- Doing well after Lap CCY. Discharge home. RTC with me in 4  weeks. No lifting > 30 lbs until RTC. May remove dressings in 24 hours, may shower at that time. PO antibiotics to complete a 7 day course.      Relevant Orders    Tissue Pathology Exam     Other Visit Diagnoses       Acute abdominal pain in right upper quadrant                 Active Hospital Problems    Diagnosis  POA    **Acute cholecystitis [K81.0]  Yes      Resolved Hospital Problems   No resolved problems to display.              Ori Jean Baptiste MD  10/27/2024  09:39 EDT      "

## 2024-10-27 NOTE — PLAN OF CARE
Goal Outcome :  Pt is s/p lap cholecystectomy yesterday by Dr Jean Baptiste. Pt is doing well and is eager to go home.. Pts drsgs have old drainage but no new signs of bleeding are noted. Discharge instructions were reviewed with Shweta and she verbalizes an understanding of weight restrictions and prescibed medications including antibiotics . She will f/u with her PCP in one week and Dr Jean Baptiste in four weeks.

## 2024-10-27 NOTE — CASE MANAGEMENT/SOCIAL WORK
Continued Stay Note   [facility]     Patient Name: Shweta Canela  MRN: [id number]  Today's Date: 10/27/2024    Admit Date: 10/25/2024    Plan: home   Discharge Plan       Row Name 10/27/24 1012       Plan    Plan home    Patient/Family in Agreement with Plan yes    Final Discharge Disposition Code 01 - home or self-care                   Discharge Codes    No documentation.                 Expected Discharge Date and Time       Expected Discharge Date Expected Discharge Time    Oct 27, 2024               Nena Ignacio RN

## 2024-10-27 NOTE — PLAN OF CARE
Problem: Adult Inpatient Plan of Care  Goal: Absence of Hospital-Acquired Illness or Injury  Intervention: Identify and Manage Fall Risk  Recent Flowsheet Documentation  Taken 10/26/2024 2200 by Yesi Cline RN  Safety Promotion/Fall Prevention:   activity supervised   assistive device/personal items within reach   clutter free environment maintained   fall prevention program maintained   lighting adjusted   nonskid shoes/slippers when out of bed   room organization consistent   safety round/check completed  Taken 10/26/2024 2000 by Cline, Yesi M, RN  Safety Promotion/Fall Prevention:   assistive device/personal items within reach   clutter free environment maintained   fall prevention program maintained   lighting adjusted   nonskid shoes/slippers when out of bed   room organization consistent   safety round/check completed  Intervention: Prevent Skin Injury  Recent Flowsheet Documentation  Taken 10/26/2024 2200 by Yesi Cline RN  Body Position: position changed independently  Skin Protection: transparent dressing maintained  Taken 10/26/2024 2000 by Yesi Cline RN  Body Position: position changed independently  Skin Protection: transparent dressing maintained  Goal: Optimal Comfort and Wellbeing  Intervention: Provide Person-Centered Care  Recent Flowsheet Documentation  Taken 10/26/2024 2200 by Yesi Cline RN  Trust Relationship/Rapport:   care explained   choices provided   emotional support provided   empathic listening provided   questions answered   questions encouraged   reassurance provided   thoughts/feelings acknowledged  Taken 10/26/2024 2000 by Yesi Cline RN  Trust Relationship/Rapport:   care explained   choices provided   emotional support provided   empathic listening provided   questions answered   questions encouraged   reassurance provided   thoughts/feelings acknowledged     Problem: Pain Acute  Goal: Optimal Pain Control and  Function  Intervention: Optimize Psychosocial Wellbeing  Recent Flowsheet Documentation  Taken 10/26/2024 2200 by Yesi Cline, RN  Supportive Measures:   active listening utilized   decision-making supported   relaxation techniques promoted  Diversional Activities:   smartphone   television  Taken 10/26/2024 2000 by Yesi Cline, RN  Supportive Measures:   active listening utilized   decision-making supported   self-care encouraged  Diversional Activities:   smartphone   television  Intervention: Prevent or Manage Pain  Recent Flowsheet Documentation  Taken 10/26/2024 2200 by Yesi Cline, RN  Sensory Stimulation Regulation:   care clustered   lighting decreased   quiet environment promoted  Medication Review/Management: medications reviewed  Taken 10/26/2024 2000 by Yesi Cline RN  Sensory Stimulation Regulation:   care clustered   lighting decreased   quiet environment promoted   television on  Bowel Elimination Promotion:   adequate fluid intake promoted   ambulation promoted   diet adjusted  Sleep/Rest Enhancement:   awakenings minimized   family presence promoted   noise level reduced   relaxation techniques promoted  Medication Review/Management: medications reviewed   Goal Outcome Evaluation:

## 2024-10-27 NOTE — DISCHARGE INSTR - ACTIVITY
AS TOLERATED.    REFRAIN FROM LIFTING ANYTHING GREATER THAN 30LBS.    REMOVE DRESSINGS IN 24HOURS, MAY SHOWER AT THAT TIME.

## 2024-12-26 ENCOUNTER — TELEPHONE (OUTPATIENT)
Dept: OBSTETRICS AND GYNECOLOGY | Facility: CLINIC | Age: 29
End: 2024-12-26
Payer: OTHER GOVERNMENT

## 2024-12-26 DIAGNOSIS — Z32.00 UNCONFIRMED PREGNANCY: Primary | ICD-10-CM

## 2024-12-26 LAB — HCG INTACT+B SERPL-ACNC: 43.6 MIU/ML

## 2024-12-26 NOTE — TELEPHONE ENCOUNTER
Pt states that pregnancy test are not showing a strong positive. She is wanting to know if she should come in and get blood work to verify.

## 2024-12-26 NOTE — TELEPHONE ENCOUNTER
Patient of Dr. Curtis; LOV 03/19/24 for PP visit. Has been seeing  for GYN care since then. She is scheduled for NOB 01/22/25 with Dr. Curtis.  Returned patient's call.   LMP approx 11/25/24, within a week or so = 4w 3d  +UPT 1 1/2 weeks ago; states she has tested each morning and positive line has always been faint.  Denies any problems.   States she is concerned that the positive line is not getting darker and would like to have labs to confirm pregnancy.   Informed her she can come in to the office for labs; she v/u and plans to come in today.

## 2024-12-27 ENCOUNTER — TELEPHONE (OUTPATIENT)
Dept: OBSTETRICS AND GYNECOLOGY | Facility: CLINIC | Age: 29
End: 2024-12-27
Payer: OTHER GOVERNMENT

## 2024-12-27 DIAGNOSIS — Z32.00 UNCONFIRMED PREGNANCY: Primary | ICD-10-CM

## 2024-12-27 DIAGNOSIS — R79.89 LOW SERUM PROGESTERONE: ICD-10-CM

## 2024-12-27 LAB — PROGEST SERPL-MCNC: 4 NG/ML

## 2024-12-27 RX ORDER — PROGESTERONE 100 MG/1
100 CAPSULE ORAL DAILY
Qty: 20 CAPSULE | Refills: 0 | Status: SHIPPED | OUTPATIENT
Start: 2024-12-27

## 2024-12-27 NOTE — TELEPHONE ENCOUNTER
PT is calling to go over her HCG and progesterone results. PT is very worried that there is something wrong.

## 2024-12-27 NOTE — TELEPHONE ENCOUNTER
Reviewed lab work with pt. Let her know she will need to repeat hcg on Sunday or Monday to make sure it is rising appropriately and I will speak with APRN about sending in progesterone. She VU

## 2024-12-29 ENCOUNTER — LAB (OUTPATIENT)
Dept: LAB | Facility: HOSPITAL | Age: 29
End: 2024-12-29
Payer: OTHER GOVERNMENT

## 2024-12-29 PROCEDURE — 84702 CHORIONIC GONADOTROPIN TEST: CPT | Performed by: OBSTETRICS & GYNECOLOGY

## 2024-12-30 ENCOUNTER — TELEPHONE (OUTPATIENT)
Dept: OBSTETRICS AND GYNECOLOGY | Facility: CLINIC | Age: 29
End: 2024-12-30
Payer: OTHER GOVERNMENT

## 2024-12-30 NOTE — TELEPHONE ENCOUNTER
Per Radha: Needs to repeat hcg, can't do ultrasound yet because hcg isn't near the discriminatory zone     S/w pt she v/u

## 2024-12-30 NOTE — TELEPHONE ENCOUNTER
Dr. Curtis pt.   MBT: A +  LMP: 11/25 (5 weeks approx)    S/w pt she wanted to know if her HCG results from yesterday are back yet and states she started having vaginal bleeding yesterday with some vaginal blood clots that are not as big as a golf ball in size.     Patient denies: severe cramping/pelvic pain, saturating 1 pad <1 hour, vaginal blood clots bigger than golf ball in size    I told the patient her HCG lab results are not back from yesterday but I would check again after lunch today, and advised patient to pelvic rest, monitor her s/s, take tylenol or ibuprofen OTC and if she has any of the denied s/s above to CB or go to ED. She v/u

## 2024-12-31 ENCOUNTER — TELEPHONE (OUTPATIENT)
Dept: OBSTETRICS AND GYNECOLOGY | Facility: CLINIC | Age: 29
End: 2024-12-31
Payer: OTHER GOVERNMENT

## 2024-12-31 VITALS
WEIGHT: 147.8 LBS | SYSTOLIC BLOOD PRESSURE: 108 MMHG | BODY MASS INDEX: 27.9 KG/M2 | DIASTOLIC BLOOD PRESSURE: 72 MMHG | HEIGHT: 61 IN

## 2024-12-31 DIAGNOSIS — O03.9 ABORTION, SPONTANEOUS: Primary | ICD-10-CM

## 2024-12-31 LAB — HCG INTACT+B SERPL-ACNC: 4.13 MIU/ML

## 2024-12-31 NOTE — TELEPHONE ENCOUNTER
Patient of Dr. Curtis; LOV 03/19/24 for PP visit. Has been seeing  for GYN care since then. She is scheduled for NOB 01/22/25 with Dr. Curtis.  Returned patient's call.   LMP approx 11/25/24, within a week or so = 5w 1d  12/26/24 HCG = 43.60; Progesterone = 4.0  12/27/24 RX sent for Progesterone supplement  12/29/24 HCG = 34  12/29/24 Started bleeding; it was like a period, with a lot of back and leg pain.   Bleeding continued yesterday.   States this morning she passed something about the size of 2 golf balls that looked like it might have been the baby. Bleeding is unchanged.   Still having some cramping; it has never been intense.   MBT is A positive  States she was supposed to come in today for another HCG level. Asking if she will need to be seen.   Discussed with CORRINE Balderas. She recommends patient be seen today to determine plan of care. Can repeat HCG while here; will not need an ultrasound.  Informed patient. She v/u and agreed. Appointment scheduled for this morning.

## 2024-12-31 NOTE — TELEPHONE ENCOUNTER
Pt is currently having a miscarriage and was advised that should she pass a clot larger than a golf ball to call.

## 2024-12-31 NOTE — PROGRESS NOTES
Chief Complaint   Patient presents with    Threatened Miscarriage          HPI  Shweta Canela is a 29 y.o. female, , Patient's last menstrual period was 2024 (approximate). who presents with bleeding. She is scheduled for NOB with Dr. Curtis 25. She started having vaginal bleeding on  that was like a period flow and bright red bleeding with a lot of back and leg pain. Bleeding continued yesterday. States this morning she passed bright/dark red blood clot the size of 2 golf balls that looked like it might have been embryo. Bleeding is unchanged. Still having mild cramping.  States she was supposed to come in today for another HCG level.     Prior to onset on bleeding, patient had lab work with serial HCG's:  HCG= 43.60 Progesterone= 4.0. Progesterone supplement ordered .  HCG= 34.      Recent Tests:  She had a urine pregnancy test that was done 12 days ago that was positive..    US today: No.  She has not had prenatal care.  She complains of cramping pain.  The pain is located in her lower abdomen.. Her past medical history is non-contributory.  She unsure if she's had passage of tissue.  Rh Status: Positive  She reports no additional symptoms or complaints.    The additional following portions of the patient's history were reviewed and updated as appropriate: allergies, current medications, past family history, past medical history, past social history, past surgical history, and problem list.    Review of Systems   Constitutional: Negative.    HENT: Negative.     Eyes: Negative.    Respiratory: Negative.     Cardiovascular: Negative.    Gastrointestinal: Negative.    Endocrine: Negative.    Genitourinary:  Positive for vaginal bleeding.        Cramping   Musculoskeletal: Negative.    Skin: Negative.    Allergic/Immunologic: Negative.    Neurological: Negative.    Hematological: Negative.    Psychiatric/Behavioral: Negative.       All other systems reviewed and are  "negative.     I have reviewed and agree with the HPI, ROS, and historical information as entered above. Chitra Diop Gary, APRN      Objective   /72   Ht 154.9 cm (61\")   Wt 67 kg (147 lb 12.8 oz)   LMP 2024 (Approximate)   BMI 27.93 kg/m²     Physical Exam  Vitals and nursing note reviewed. Exam conducted with a chaperone present.   Constitutional:       General: She is not in acute distress.     Appearance: Normal appearance. She is not ill-appearing.   Pulmonary:      Effort: Pulmonary effort is normal. No respiratory distress.   Genitourinary:     General: Normal vulva.      Vagina: Normal.      Cervix: Normal.      Uterus: Normal.       Adnexa: Right adnexa normal and left adnexa normal.      Comments: Small blood clot removed at cervix.  No active bleeding.  Skin:     General: Skin is warm and dry.   Neurological:      Mental Status: She is alert and oriented to person, place, and time.   Psychiatric:         Mood and Affect: Mood normal.         Behavior: Behavior normal.            Assessment and Plan    Problem List Items Addressed This Visit    None  Visit Diagnoses       , spontaneous    -  Primary    Relevant Orders    HCG, B-subunit, Quantitative            D/w pt repeat hcg now.  Call prn severe pain or bleeding, odor, fever.  No UPI.  She declines Rx contraception.  Return if symptoms worsen or fail to improve.        Chitra Chikis Vega, APRN  2024   "

## 2025-03-25 ENCOUNTER — TELEPHONE (OUTPATIENT)
Dept: OBSTETRICS AND GYNECOLOGY | Facility: CLINIC | Age: 30
End: 2025-03-25
Payer: OTHER GOVERNMENT

## 2025-03-25 DIAGNOSIS — O09.291 CURRENT PREGNANCY IN FIRST TRIMESTER WITH HISTORY OF SPONTANEOUS ABORTION DURING PRIOR PREGNANCY: Primary | ICD-10-CM

## 2025-03-25 RX ORDER — PROGESTERONE 100 MG/1
100 CAPSULE ORAL NIGHTLY
Qty: 30 CAPSULE | Refills: 1 | Status: SHIPPED | OUTPATIENT
Start: 2025-03-25

## 2025-03-25 NOTE — TELEPHONE ENCOUNTER
Patient of Dr. Curtis; LOV 12/31/24 with CORRINE Gray for SAB. HCG = 4.13; was advised to return for repeat HCG in one week.   Patient saw [facility] 01/03/25; HCG <1.  03/21/25 [facility]; HCG = 23.8; Progesterone = 18  03/24/25 [facility]; HCG = 47.1; Progesterone = 10.5  She is scheduled for NOB visit with Dr. Curtis 04/23/25.  Discussed with Dr. Curtis. She recommends patient start progesterone supplement tonight and have HCG and Progesterone levels drawn here tomorrow.   Attempted to return patient's call. Left voice message to call us back.

## 2025-03-25 NOTE — TELEPHONE ENCOUNTER
Pt is concerned about her HCG levels. She would like to speak to someone to see what her options are

## 2025-03-25 NOTE — TELEPHONE ENCOUNTER
Returned patient's call.   No complaints offered but is anxious about lab results.   Informed her of Dr. Curtis' recommendations.   She v/u and agreed.   States she has a few doses of Prometrium 100 mg left; informed her Rx sent to her pharmacy.  States she prefers to have repeat labs done at  because results are available sooner and that helps her anxiety. She will contact the provider she sees there for GYN care for the orders.   Patient will call us when results are available.

## 2025-03-27 DIAGNOSIS — O02.81 INAPPROPRIATE CHANGE IN QUANTITATIVE HCG IN EARLY PREGNANCY: Primary | ICD-10-CM

## 2025-03-28 ENCOUNTER — TELEPHONE (OUTPATIENT)
Dept: OBSTETRICS AND GYNECOLOGY | Facility: CLINIC | Age: 30
End: 2025-03-28
Payer: OTHER GOVERNMENT

## 2025-03-28 NOTE — TELEPHONE ENCOUNTER
LOS patient  LMP 02/25/25 (4w3d)  + UPT one week ago   Hx SAB in December at 5w3d    Patient calling to see if she needs to come in for labs today. Patient's Hcg on 03/21/25 was 23.8 and Progesterone was 18. Hcg on 03/24/25 was 47.1 and  Progesterone was 10.5. Hcg on 03/26/25 was 36.1 and Progesterone was 5.71. Patient denies any vaginal bleeding. Patient reports lower back pain today and a few sharp stabbing pains on left side a few times yesterday. Patient denies any sharp stabbing pain on one side today. Patient denies any upper shoulder pain. Patient reports a dizzy spell a few days ago. Patient informed that I would speak with a provider and call her back.      Per NF, patient needs to come in today for a Hcg and give ectopic precautions.    Patient notified of need to come in today for Hcg and given ectopic precautions. Pt v/u.

## 2025-03-29 LAB — HCG INTACT+B SERPL-ACNC: 10 MIU/ML

## 2025-04-01 ENCOUNTER — RESULTS FOLLOW-UP (OUTPATIENT)
Dept: OBSTETRICS AND GYNECOLOGY | Facility: CLINIC | Age: 30
End: 2025-04-01
Payer: OTHER GOVERNMENT

## 2025-04-01 NOTE — TELEPHONE ENCOUNTER
Shoulder pain and migraines daily for the past 5 days. She report history of migraines but not like the one she is having. Denies CP and SOA. Hcg 3/28/2025 was10. She states she went to PCP yesterday for evaluation of migraine. The shoulder pain started today. She states HCG was drawn yesterday at PCP's office and the HCG was <5. Patient instructed to fax HCG results to our office. Recommended Tylenol, IBU, and heat for shoulder pain. Follow up with PCP if symptoms persist or worsen. ED for emergent symptoms. Pt DILSHAD.

## 2025-04-01 NOTE — TELEPHONE ENCOUNTER
Has been having shoulder pain for last hour, also has migraine and took ibuprofen about an hour ago is currently being treated for chemical pregnancy

## 2025-04-08 ENCOUNTER — TELEPHONE (OUTPATIENT)
Dept: OBSTETRICS AND GYNECOLOGY | Facility: CLINIC | Age: 30
End: 2025-04-08

## 2025-04-08 NOTE — TELEPHONE ENCOUNTER
Pt states she recently had 2 miscarriages and she had some labs done with PCP. PCP wants to do hematology referral for elevated DRVVT and high liver function tests. Informed pt to let us know what hematology says once she has the hematology appointment so we can let Dr. Curtis know. Pt v/u.

## 2025-04-08 NOTE — TELEPHONE ENCOUNTER
Pt called in [facility] recently had blood work completed by her PCP. Her PCP suggests she sees a HEMATOLOGIST referral due to elevated DRVVT and having high liver alt results. She has questions

## 2025-05-22 ENCOUNTER — TRANSCRIBE ORDERS (OUTPATIENT)
Dept: LAB | Facility: HOSPITAL | Age: 30
End: 2025-05-22
Payer: OTHER GOVERNMENT

## 2025-05-22 ENCOUNTER — LAB (OUTPATIENT)
Dept: LAB | Facility: HOSPITAL | Age: 30
End: 2025-05-22
Payer: OTHER GOVERNMENT

## 2025-05-22 DIAGNOSIS — Z31.69 GENERAL COUNSELING AND ADVICE ON PROCREATIVE MANAGEMENT: ICD-10-CM

## 2025-05-22 DIAGNOSIS — Z31.69 GENERAL COUNSELING AND ADVICE ON PROCREATIVE MANAGEMENT: Primary | ICD-10-CM

## 2025-05-22 LAB
ALBUMIN SERPL-MCNC: 4.2 G/DL (ref 3.5–5.2)
ALBUMIN/GLOB SERPL: 1.3 G/DL
ALP SERPL-CCNC: 75 U/L (ref 39–117)
ALT SERPL W P-5'-P-CCNC: 25 U/L (ref 1–33)
ANION GAP SERPL CALCULATED.3IONS-SCNC: 13 MMOL/L (ref 5–15)
AST SERPL-CCNC: 26 U/L (ref 1–32)
BILIRUB SERPL-MCNC: 0.2 MG/DL (ref 0–1.2)
BUN SERPL-MCNC: 13 MG/DL (ref 6–20)
BUN/CREAT SERPL: 15.9 (ref 7–25)
CALCIUM SPEC-SCNC: 10.1 MG/DL (ref 8.6–10.5)
CHLORIDE SERPL-SCNC: 104 MMOL/L (ref 98–107)
CO2 SERPL-SCNC: 22 MMOL/L (ref 22–29)
CREAT SERPL-MCNC: 0.82 MG/DL (ref 0.57–1)
EGFRCR SERPLBLD CKD-EPI 2021: 99.4 ML/MIN/1.73
GLOBULIN UR ELPH-MCNC: 3.2 GM/DL
GLUCOSE SERPL-MCNC: 85 MG/DL (ref 65–99)
POTASSIUM SERPL-SCNC: 4.2 MMOL/L (ref 3.5–5.2)
PROT SERPL-MCNC: 7.4 G/DL (ref 6–8.5)
SODIUM SERPL-SCNC: 139 MMOL/L (ref 136–145)

## 2025-05-22 PROCEDURE — 83001 ASSAY OF GONADOTROPIN (FSH): CPT

## 2025-05-22 PROCEDURE — 83002 ASSAY OF GONADOTROPIN (LH): CPT

## 2025-05-22 PROCEDURE — 84146 ASSAY OF PROLACTIN: CPT

## 2025-05-22 PROCEDURE — 84443 ASSAY THYROID STIM HORMONE: CPT

## 2025-05-22 PROCEDURE — 80053 COMPREHEN METABOLIC PANEL: CPT | Performed by: STUDENT IN AN ORGANIZED HEALTH CARE EDUCATION/TRAINING PROGRAM

## 2025-05-22 PROCEDURE — 36415 COLL VENOUS BLD VENIPUNCTURE: CPT | Performed by: STUDENT IN AN ORGANIZED HEALTH CARE EDUCATION/TRAINING PROGRAM

## 2025-05-23 LAB
FSH SERPL-ACNC: 1.68 MIU/ML
LH SERPL-ACNC: 4.39 MIU/ML
PROLACTIN SERPL-MCNC: 9.95 NG/ML (ref 4.79–23.3)
TSH SERPL DL<=0.05 MIU/L-ACNC: 2.09 UIU/ML (ref 0.27–4.2)

## 2025-08-13 ENCOUNTER — OFFICE VISIT (OUTPATIENT)
Age: 30
End: 2025-08-13
Payer: OTHER GOVERNMENT

## 2025-08-13 VITALS
HEIGHT: 62 IN | SYSTOLIC BLOOD PRESSURE: 122 MMHG | DIASTOLIC BLOOD PRESSURE: 72 MMHG | WEIGHT: 151.8 LBS | BODY MASS INDEX: 27.94 KG/M2

## 2025-08-13 DIAGNOSIS — M25.552 BILATERAL HIP PAIN: Primary | ICD-10-CM

## 2025-08-13 DIAGNOSIS — M25.851 FEMOROACETABULAR IMPINGEMENT OF BOTH HIPS: ICD-10-CM

## 2025-08-13 DIAGNOSIS — M25.551 BILATERAL HIP PAIN: Primary | ICD-10-CM

## 2025-08-13 DIAGNOSIS — M25.852 FEMOROACETABULAR IMPINGEMENT OF BOTH HIPS: ICD-10-CM

## 2025-08-13 RX ORDER — ACETAMINOPHEN 500 MG
500 TABLET ORAL EVERY 6 HOURS PRN
COMMUNITY

## 2025-08-13 RX ORDER — TRIAMCINOLONE ACETONIDE 1 MG/G
CREAM TOPICAL
COMMUNITY
Start: 2025-07-29

## 2025-08-13 RX ORDER — PROGESTERONE 200 MG/1
CAPSULE ORAL
COMMUNITY
Start: 2025-07-29

## (undated) DEVICE — Device

## (undated) DEVICE — SYR LUERLOK 20CC BX/50

## (undated) DEVICE — CATH CHOLANG 7.5F18IN BLU

## (undated) DEVICE — BOWL UTIL STRL 32OZ

## (undated) DEVICE — SYR LUERLOK 30CC

## (undated) DEVICE — STPCK 4WY ON/OFF VLV M/COLAR FIT 45PSI STRL

## (undated) DEVICE — PAD GRND E/S MEGADYNE MONOPLR 2/PLT W/CORD A/ DISP

## (undated) DEVICE — SUT SILK 2/0 TIES 18IN A185H

## (undated) DEVICE — ENDOPOUCH RETRIEVER SPECIMEN RETRIEVAL BAGS: Brand: ENDOPOUCH RETRIEVER

## (undated) DEVICE — ENDOPATH XCEL BLADELESS TROCARS WITH STABILITY SLEEVES: Brand: ENDOPATH XCEL

## (undated) DEVICE — APPL CHLORAPREP TINTED 26ML TEAL

## (undated) DEVICE — BLANKT WARM UPPR/BDY ARM/OUT 57X196CM

## (undated) DEVICE — TBG PENCL TELESCP MEGADYNE SMOKE EVAC 10FT

## (undated) DEVICE — SUT VIC 0/0 CT 27IN J352H BX/36

## (undated) DEVICE — LAPAROSCOPIC SMOKE FILTRATION SYSTEM: Brand: PALL LAPAROSHIELD® PLUS LAPAROSCOPIC SMOKE FILTRATION SYSTEM

## (undated) DEVICE — SOL IRR H2O BTL 1000ML STRL

## (undated) DEVICE — SUT PLAIN  3/0 CT1 27IN 842H

## (undated) DEVICE — SOL IRR NACL 0.9PCT BT 1000ML

## (undated) DEVICE — SYR ART BLD GAS HEP 1CC

## (undated) DEVICE — TRY SPINE BLCK WHITACRE 25G 3X5IN

## (undated) DEVICE — FEEDING TUBE: Brand: ARGYLE

## (undated) DEVICE — CLTH CLENS READYCLEANSE PERI CARE PK/5

## (undated) DEVICE — ENDOPATH XCEL UNIVERSAL TROCAR STABLILITY SLEEVES: Brand: ENDOPATH XCEL

## (undated) DEVICE — TRAP FLD MINIVAC MEGADYNE 100ML

## (undated) DEVICE — MAT PREVALON MOBL TRANSFR AIR W/PAD REPROC 39X81IN

## (undated) DEVICE — GLV SURG SENSICARE PI MIC PF SZ7 LF STRL

## (undated) DEVICE — SNAP KOVER: Brand: UNBRANDED

## (undated) DEVICE — PATIENT RETURN ELECTRODE, SINGLE-USE, CONTACT QUALITY MONITORING, ADULT, WITH 9FT CORD, FOR PATIENTS WEIGING OVER 33LBS. (15KG): Brand: MEGADYNE

## (undated) DEVICE — [HIGH FLOW INSUFFLATOR,  DO NOT USE IF PACKAGE IS DAMAGED,  KEEP DRY,  KEEP AWAY FROM SUNLIGHT,  PROTECT FROM HEAT AND RADIOACTIVE SOURCES.]: Brand: PNEUMOSURE

## (undated) DEVICE — MANIFLD WAST MGMT SYS NEPTUNE2 4PT

## (undated) DEVICE — GOWN SURG ORBIS LVL3 2XL STRL

## (undated) DEVICE — SUT VIC 2/0 CT1 27IN J339H BX/36

## (undated) DEVICE — ANTIBACTERIAL UNDYED BRAIDED (POLYGLACTIN 910), SYNTHETIC ABSORBABLE SUTURE: Brand: COATED VICRYL

## (undated) DEVICE — GLV SURG BIOGEL LTX PF 6

## (undated) DEVICE — SUT GUT CHRM 2/0 CT1 27IN 811H

## (undated) DEVICE — NDL HYPO ECLPS SFTY 18G 1 1/2IN

## (undated) DEVICE — SUT GUT CHRM 1 CTX 36IN 905H

## (undated) DEVICE — SUT VIC 0 UR6 27IN VCP603H

## (undated) DEVICE — GLV SURG SENSICARE PI MIC PF SZ7.5 LF STRL

## (undated) DEVICE — ST EXT IV TBG W SECUR LK 20IN

## (undated) DEVICE — PK C/SECT 10

## (undated) DEVICE — LAPAROVUE VISIBILITY SYSTEM LAPAROSCOPIC SOLUTIONS: Brand: LAPAROVUE

## (undated) DEVICE — ENDOCUT SCISSOR TIP, DISPOSABLE: Brand: RENEW

## (undated) DEVICE — PK LAP LASR CHOLE 10

## (undated) DEVICE — STPLR SKIN PROXIMATE RH WD